# Patient Record
Sex: FEMALE | Race: WHITE | Employment: FULL TIME | URBAN - METROPOLITAN AREA
[De-identification: names, ages, dates, MRNs, and addresses within clinical notes are randomized per-mention and may not be internally consistent; named-entity substitution may affect disease eponyms.]

---

## 2017-08-19 ENCOUNTER — HOSPITAL ENCOUNTER (OUTPATIENT)
Age: 52
Discharge: HOME | End: 2017-08-19
Payer: COMMERCIAL

## 2017-08-19 LAB
ALBUMIN SERPL-MCNC: 4.2 G/DL (ref 3.5–5.1)
ALP BLD-CCNC: 103 U/L (ref 38–126)
ALT SERPL-CCNC: 81 U/L (ref 11–66)
ANION GAP SERPL CALCULATED.3IONS-SCNC: 22 MEQ/L (ref 8–16)
AST SERPL-CCNC: 75 U/L (ref 5–40)
BILIRUB SERPL-MCNC: 0.4 MG/DL (ref 0.3–1.2)
BUN BLDV-MCNC: 11 MG/DL (ref 7–22)
CALCIUM SERPL-MCNC: 9.6 MG/DL (ref 8.5–10.5)
CHLORIDE BLD-SCNC: 99 MEQ/L (ref 98–111)
CO2: 25 MEQ/L (ref 23–33)
CREAT SERPL-MCNC: 0.6 MG/DL (ref 0.4–1.2)
GFR SERPL CREATININE-BSD FRML MDRD: > 90 ML/MIN/1.73M2
GLUCOSE BLD-MCNC: 97 MG/DL (ref 70–108)
POTASSIUM SERPL-SCNC: 4.6 MEQ/L (ref 3.5–5.2)
SODIUM BLD-SCNC: 146 MEQ/L (ref 135–145)
TOTAL PROTEIN: 7.6 G/DL (ref 6.1–8)
TSH SERPL DL<=0.05 MIU/L-ACNC: 3.61 UIU/ML (ref 0.4–4.2)

## 2017-08-19 PROCEDURE — 80053 COMPREHEN METABOLIC PANEL: CPT

## 2017-08-19 PROCEDURE — 36415 COLL VENOUS BLD VENIPUNCTURE: CPT

## 2017-08-19 PROCEDURE — 84443 ASSAY THYROID STIM HORMONE: CPT

## 2017-09-18 ENCOUNTER — HOSPITAL ENCOUNTER (OUTPATIENT)
Dept: MAMMOGRAPHY | Age: 52
Discharge: HOME | End: 2017-09-18
Payer: COMMERCIAL

## 2017-09-18 DIAGNOSIS — Z12.9 SCREENING FOR CANCER: ICD-10-CM

## 2017-09-18 PROCEDURE — G0202 SCR MAMMO BI INCL CAD: HCPCS

## 2017-09-26 ENCOUNTER — HOSPITAL ENCOUNTER (OUTPATIENT)
Dept: WOMENS IMAGING | Age: 52
Discharge: HOME | End: 2017-09-26
Payer: COMMERCIAL

## 2017-09-26 DIAGNOSIS — R92.2 BREAST DENSITY: ICD-10-CM

## 2017-09-26 PROCEDURE — 76642 ULTRASOUND BREAST LIMITED: CPT

## 2018-03-03 ENCOUNTER — HOSPITAL ENCOUNTER (OUTPATIENT)
Age: 53
Discharge: HOME | End: 2018-03-03
Payer: COMMERCIAL

## 2018-03-03 LAB
ALT SERPL-CCNC: 115 U/L (ref 11–66)
ANION GAP SERPL CALCULATED.3IONS-SCNC: 13 MEQ/L (ref 8–16)
BUN BLDV-MCNC: 10 MG/DL (ref 7–22)
CALCIUM SERPL-MCNC: 8.9 MG/DL (ref 8.5–10.5)
CHLORIDE BLD-SCNC: 105 MEQ/L (ref 98–111)
CHOLESTEROL, TOTAL: 191 MG/DL (ref 100–199)
CO2: 27 MEQ/L (ref 23–33)
CREAT SERPL-MCNC: 0.5 MG/DL (ref 0.4–1.2)
GFR SERPL CREATININE-BSD FRML MDRD: > 90 ML/MIN/1.73M2
GLUCOSE BLD-MCNC: 91 MG/DL (ref 70–108)
HDLC SERPL-MCNC: 38 MG/DL
LDL CHOLESTEROL CALCULATED: 118 MG/DL
POTASSIUM SERPL-SCNC: 4.2 MEQ/L (ref 3.5–5.2)
SODIUM BLD-SCNC: 145 MEQ/L (ref 135–145)
TRIGL SERPL-MCNC: 177 MG/DL (ref 0–199)

## 2018-03-03 PROCEDURE — 80061 LIPID PANEL: CPT

## 2018-03-03 PROCEDURE — 36415 COLL VENOUS BLD VENIPUNCTURE: CPT

## 2018-03-03 PROCEDURE — 84460 ALANINE AMINO (ALT) (SGPT): CPT

## 2018-03-03 PROCEDURE — 80048 BASIC METABOLIC PNL TOTAL CA: CPT

## 2018-03-27 ENCOUNTER — HOSPITAL ENCOUNTER (OUTPATIENT)
Dept: ULTRASOUND IMAGING | Age: 53
Discharge: HOME | End: 2018-03-27
Payer: COMMERCIAL

## 2018-03-27 ENCOUNTER — HOSPITAL ENCOUNTER (OUTPATIENT)
Age: 53
Discharge: HOME | End: 2018-03-27
Payer: COMMERCIAL

## 2018-03-27 DIAGNOSIS — R74.8 ABNORMAL LIVER ENZYMES: ICD-10-CM

## 2018-03-27 LAB
ALBUMIN SERPL-MCNC: 4.2 G/DL (ref 3.5–5.1)
ALP BLD-CCNC: 97 U/L (ref 38–126)
ALT SERPL-CCNC: 106 U/L (ref 11–66)
AST SERPL-CCNC: 89 U/L (ref 5–40)
BILIRUB SERPL-MCNC: 0.4 MG/DL (ref 0.3–1.2)
BILIRUBIN DIRECT: < 0.2 MG/DL (ref 0–0.3)
HEPATITIS B SURFACE ANTIGEN: NEGATIVE
HEPATITIS C ANTIBODY: NEGATIVE
TOTAL PROTEIN: 7.6 G/DL (ref 6.1–8)

## 2018-03-27 PROCEDURE — 80076 HEPATIC FUNCTION PANEL: CPT

## 2018-03-27 PROCEDURE — 86803 HEPATITIS C AB TEST: CPT

## 2018-03-27 PROCEDURE — 87340 HEPATITIS B SURFACE AG IA: CPT

## 2018-03-27 PROCEDURE — 36415 COLL VENOUS BLD VENIPUNCTURE: CPT

## 2018-03-27 PROCEDURE — 76705 ECHO EXAM OF ABDOMEN: CPT

## 2018-09-08 ENCOUNTER — HOSPITAL ENCOUNTER (OUTPATIENT)
Age: 53
Discharge: HOME | End: 2018-09-08
Payer: COMMERCIAL

## 2018-09-08 PROCEDURE — 83036 HEMOGLOBIN GLYCOSYLATED A1C: CPT

## 2018-09-08 PROCEDURE — 36415 COLL VENOUS BLD VENIPUNCTURE: CPT

## 2018-09-09 LAB
AVERAGE GLUCOSE: 108 MG/DL (ref 70–126)
HBA1C MFR BLD: 5.6 % (ref 4.4–6.4)

## 2018-09-15 ENCOUNTER — HOSPITAL ENCOUNTER (OUTPATIENT)
Age: 53
Discharge: HOME | End: 2018-09-15
Payer: COMMERCIAL

## 2018-09-15 LAB
ALBUMIN SERPL-MCNC: 4.4 G/DL (ref 3.5–5.1)
ALP BLD-CCNC: 103 U/L (ref 38–126)
ALT SERPL-CCNC: 30 U/L (ref 11–66)
ANION GAP SERPL CALCULATED.3IONS-SCNC: 12 MEQ/L (ref 8–16)
AST SERPL-CCNC: 27 U/L (ref 5–40)
BILIRUB SERPL-MCNC: 0.4 MG/DL (ref 0.3–1.2)
BUN BLDV-MCNC: 13 MG/DL (ref 7–22)
CALCIUM SERPL-MCNC: 9.7 MG/DL (ref 8.5–10.5)
CHLORIDE BLD-SCNC: 107 MEQ/L (ref 98–111)
CO2: 28 MEQ/L (ref 23–33)
CREAT SERPL-MCNC: 0.6 MG/DL (ref 0.4–1.2)
GFR SERPL CREATININE-BSD FRML MDRD: > 90 ML/MIN/1.73M2
GLUCOSE BLD-MCNC: 120 MG/DL (ref 70–108)
POTASSIUM SERPL-SCNC: 4.7 MEQ/L (ref 3.5–5.2)
SODIUM BLD-SCNC: 147 MEQ/L (ref 135–145)
T4 FREE: 1.11 NG/DL (ref 0.93–1.76)
TOTAL PROTEIN: 7.4 G/DL (ref 6.1–8)
TSH SERPL DL<=0.05 MIU/L-ACNC: 2.32 UIU/ML (ref 0.4–4.2)

## 2018-09-15 PROCEDURE — 84439 ASSAY OF FREE THYROXINE: CPT

## 2018-09-15 PROCEDURE — 80053 COMPREHEN METABOLIC PANEL: CPT

## 2018-09-15 PROCEDURE — 36415 COLL VENOUS BLD VENIPUNCTURE: CPT

## 2018-09-15 PROCEDURE — 84443 ASSAY THYROID STIM HORMONE: CPT

## 2018-10-15 ENCOUNTER — HOSPITAL ENCOUNTER (OUTPATIENT)
Dept: WOMENS IMAGING | Age: 53
Discharge: HOME | End: 2018-10-15
Payer: COMMERCIAL

## 2018-10-15 DIAGNOSIS — Z12.31 VISIT FOR SCREENING MAMMOGRAM: ICD-10-CM

## 2018-10-15 PROCEDURE — 77063 BREAST TOMOSYNTHESIS BI: CPT

## 2019-03-23 ENCOUNTER — HOSPITAL ENCOUNTER (EMERGENCY)
Age: 54
Discharge: HOME | End: 2019-03-23
Payer: COMMERCIAL

## 2019-03-23 VITALS
HEART RATE: 113 BPM | RESPIRATION RATE: 20 BRPM | DIASTOLIC BLOOD PRESSURE: 77 MMHG | SYSTOLIC BLOOD PRESSURE: 144 MMHG | OXYGEN SATURATION: 96 % | TEMPERATURE: 98.8 F | WEIGHT: 260 LBS | HEIGHT: 68 IN | BODY MASS INDEX: 39.4 KG/M2

## 2019-03-23 DIAGNOSIS — J20.9 ACUTE BRONCHITIS, UNSPECIFIED ORGANISM: Primary | ICD-10-CM

## 2019-03-23 DIAGNOSIS — R09.81 SINUS CONGESTION: ICD-10-CM

## 2019-03-23 PROCEDURE — 99213 OFFICE O/P EST LOW 20 MIN: CPT | Performed by: NURSE PRACTITIONER

## 2019-03-23 PROCEDURE — 99212 OFFICE O/P EST SF 10 MIN: CPT

## 2019-03-23 RX ORDER — CEFDINIR 300 MG/1
300 CAPSULE ORAL 2 TIMES DAILY
Qty: 20 CAPSULE | Refills: 0 | Status: SHIPPED | OUTPATIENT
Start: 2019-03-23 | End: 2019-04-02

## 2019-03-23 RX ORDER — PREDNISONE 20 MG/1
20 TABLET ORAL 2 TIMES DAILY
Qty: 10 TABLET | Refills: 0 | Status: SHIPPED | OUTPATIENT
Start: 2019-03-23 | End: 2019-03-28

## 2019-03-23 RX ORDER — IBUPROFEN 400 MG/1
400 TABLET ORAL EVERY 6 HOURS PRN
Status: ON HOLD | COMMUNITY
End: 2019-10-21 | Stop reason: HOSPADM

## 2019-03-23 RX ORDER — BENZONATATE 200 MG/1
200 CAPSULE ORAL 3 TIMES DAILY PRN
Qty: 21 CAPSULE | Refills: 0 | Status: SHIPPED | OUTPATIENT
Start: 2019-03-23 | End: 2019-03-30

## 2019-03-23 RX ORDER — OMEPRAZOLE 40 MG/1
40 CAPSULE, DELAYED RELEASE ORAL DAILY
COMMUNITY

## 2019-03-23 ASSESSMENT — PAIN - FUNCTIONAL ASSESSMENT: PAIN_FUNCTIONAL_ASSESSMENT: PREVENTS OR INTERFERES WITH ALL ACTIVE AND SOME PASSIVE ACTIVITIES

## 2019-03-23 ASSESSMENT — PAIN DESCRIPTION - DESCRIPTORS: DESCRIPTORS: ACHING;SORE

## 2019-03-23 ASSESSMENT — ENCOUNTER SYMPTOMS
FACIAL SWELLING: 0
NAUSEA: 1
COUGH: 1
WHEEZING: 0
SINUS PRESSURE: 1
SINUS CONGESTION: 1
RHINORRHEA: 1
SORE THROAT: 1
SHORTNESS OF BREATH: 0

## 2019-03-23 ASSESSMENT — PAIN DESCRIPTION - ONSET: ONSET: ON-GOING

## 2019-03-23 ASSESSMENT — PAIN DESCRIPTION - PROGRESSION: CLINICAL_PROGRESSION: NOT CHANGED

## 2019-03-23 ASSESSMENT — PAIN SCALES - GENERAL: PAINLEVEL_OUTOF10: 8

## 2019-03-23 ASSESSMENT — PAIN DESCRIPTION - FREQUENCY: FREQUENCY: INTERMITTENT

## 2019-03-23 ASSESSMENT — PAIN DESCRIPTION - LOCATION: LOCATION: GENERALIZED;HEAD;THROAT

## 2019-03-25 ENCOUNTER — APPOINTMENT (OUTPATIENT)
Dept: GENERAL RADIOLOGY | Age: 54
End: 2019-03-25
Payer: COMMERCIAL

## 2019-03-25 ENCOUNTER — HOSPITAL ENCOUNTER (EMERGENCY)
Age: 54
Discharge: HOME | End: 2019-03-25
Payer: COMMERCIAL

## 2019-03-25 VITALS
WEIGHT: 260 LBS | DIASTOLIC BLOOD PRESSURE: 75 MMHG | BODY MASS INDEX: 40.81 KG/M2 | SYSTOLIC BLOOD PRESSURE: 146 MMHG | OXYGEN SATURATION: 95 % | RESPIRATION RATE: 20 BRPM | HEART RATE: 78 BPM | TEMPERATURE: 98.2 F | HEIGHT: 67 IN

## 2019-03-25 DIAGNOSIS — J06.9 VIRAL URI WITH COUGH: Primary | ICD-10-CM

## 2019-03-25 LAB
FLU A ANTIGEN: NEGATIVE
FLU B ANTIGEN: NEGATIVE

## 2019-03-25 PROCEDURE — 99283 EMERGENCY DEPT VISIT LOW MDM: CPT

## 2019-03-25 PROCEDURE — 71046 X-RAY EXAM CHEST 2 VIEWS: CPT

## 2019-03-25 PROCEDURE — 87804 INFLUENZA ASSAY W/OPTIC: CPT

## 2019-03-25 RX ORDER — GUAIFENESIN AND DEXTROMETHORPHAN HYDROBROMIDE 600; 30 MG/1; MG/1
1 TABLET, EXTENDED RELEASE ORAL 2 TIMES DAILY
Qty: 28 TABLET | Refills: 0 | Status: ON HOLD | OUTPATIENT
Start: 2019-03-25 | End: 2021-04-23

## 2019-03-25 ASSESSMENT — ENCOUNTER SYMPTOMS
EYE DISCHARGE: 0
ABDOMINAL PAIN: 0
SHORTNESS OF BREATH: 0
VOMITING: 0
DIARRHEA: 0
EYE PAIN: 0
BACK PAIN: 0
RHINORRHEA: 1
SORE THROAT: 1
WHEEZING: 0
NAUSEA: 0
COUGH: 1

## 2019-03-25 ASSESSMENT — PAIN SCALES - GENERAL: PAINLEVEL_OUTOF10: 5

## 2019-03-25 ASSESSMENT — PAIN DESCRIPTION - PAIN TYPE: TYPE: ACUTE PAIN

## 2019-03-25 ASSESSMENT — PAIN DESCRIPTION - DESCRIPTORS: DESCRIPTORS: DISCOMFORT

## 2019-03-25 ASSESSMENT — PAIN DESCRIPTION - LOCATION: LOCATION: RIB CAGE

## 2019-05-11 ENCOUNTER — HOSPITAL ENCOUNTER (OUTPATIENT)
Dept: GENERAL RADIOLOGY | Age: 54
Discharge: HOME | End: 2019-05-11
Payer: COMMERCIAL

## 2019-05-11 ENCOUNTER — HOSPITAL ENCOUNTER (OUTPATIENT)
Age: 54
Discharge: HOME | End: 2019-05-11
Payer: COMMERCIAL

## 2019-05-11 DIAGNOSIS — M25.562 LEFT KNEE PAIN, UNSPECIFIED CHRONICITY: ICD-10-CM

## 2019-05-11 PROCEDURE — 73564 X-RAY EXAM KNEE 4 OR MORE: CPT

## 2019-09-03 ENCOUNTER — HOSPITAL ENCOUNTER (OUTPATIENT)
Dept: ULTRASOUND IMAGING | Age: 54
Discharge: HOME | End: 2019-09-03
Payer: COMMERCIAL

## 2019-09-03 DIAGNOSIS — R79.89 LFTS ABNORMAL: ICD-10-CM

## 2019-09-03 PROCEDURE — 76705 ECHO EXAM OF ABDOMEN: CPT

## 2019-10-16 ENCOUNTER — HOSPITAL ENCOUNTER (OUTPATIENT)
Dept: WOMENS IMAGING | Age: 54
Discharge: HOME | End: 2019-10-16
Payer: COMMERCIAL

## 2019-10-16 DIAGNOSIS — Z13.9 VISIT FOR SCREENING: ICD-10-CM

## 2019-10-16 PROCEDURE — 77063 BREAST TOMOSYNTHESIS BI: CPT

## 2019-10-21 ENCOUNTER — ANESTHESIA EVENT (OUTPATIENT)
Dept: ENDOSCOPY | Age: 54
End: 2019-10-21
Payer: COMMERCIAL

## 2019-10-21 ENCOUNTER — ANESTHESIA (OUTPATIENT)
Dept: ENDOSCOPY | Age: 54
End: 2019-10-21
Payer: COMMERCIAL

## 2019-10-21 ENCOUNTER — HOSPITAL ENCOUNTER (OUTPATIENT)
Age: 54
Setting detail: OUTPATIENT SURGERY
Discharge: HOME | End: 2019-10-21
Attending: INTERNAL MEDICINE | Admitting: INTERNAL MEDICINE
Payer: COMMERCIAL

## 2019-10-21 VITALS
BODY MASS INDEX: 44.73 KG/M2 | SYSTOLIC BLOOD PRESSURE: 130 MMHG | OXYGEN SATURATION: 93 % | RESPIRATION RATE: 16 BRPM | HEIGHT: 67 IN | DIASTOLIC BLOOD PRESSURE: 72 MMHG | WEIGHT: 285 LBS | HEART RATE: 83 BPM | TEMPERATURE: 97.8 F

## 2019-10-21 VITALS
OXYGEN SATURATION: 97 % | DIASTOLIC BLOOD PRESSURE: 95 MMHG | RESPIRATION RATE: 13 BRPM | SYSTOLIC BLOOD PRESSURE: 149 MMHG

## 2019-10-21 PROCEDURE — 6360000002 HC RX W HCPCS: Performed by: NURSE ANESTHETIST, CERTIFIED REGISTERED

## 2019-10-21 PROCEDURE — 2500000003 HC RX 250 WO HCPCS: Performed by: NURSE ANESTHETIST, CERTIFIED REGISTERED

## 2019-10-21 PROCEDURE — 3700000000 HC ANESTHESIA ATTENDED CARE: Performed by: INTERNAL MEDICINE

## 2019-10-21 PROCEDURE — 2580000003 HC RX 258: Performed by: INTERNAL MEDICINE

## 2019-10-21 PROCEDURE — 7100000000 HC PACU RECOVERY - FIRST 15 MIN: Performed by: INTERNAL MEDICINE

## 2019-10-21 PROCEDURE — 3609017700 HC EGD DILATION GASTRIC/DUODENAL STRICTURE: Performed by: INTERNAL MEDICINE

## 2019-10-21 PROCEDURE — 3700000001 HC ADD 15 MINUTES (ANESTHESIA): Performed by: INTERNAL MEDICINE

## 2019-10-21 PROCEDURE — 6370000000 HC RX 637 (ALT 250 FOR IP): Performed by: NURSE ANESTHETIST, CERTIFIED REGISTERED

## 2019-10-21 PROCEDURE — 7100000001 HC PACU RECOVERY - ADDTL 15 MIN: Performed by: INTERNAL MEDICINE

## 2019-10-21 PROCEDURE — 2709999900 HC NON-CHARGEABLE SUPPLY: Performed by: INTERNAL MEDICINE

## 2019-10-21 RX ORDER — MULTIVIT WITH MINERALS/LUTEIN
1000 TABLET ORAL DAILY
Status: ON HOLD | COMMUNITY
End: 2021-04-23

## 2019-10-21 RX ORDER — LIDOCAINE HYDROCHLORIDE 20 MG/ML
INJECTION, SOLUTION INFILTRATION; PERINEURAL PRN
Status: DISCONTINUED | OUTPATIENT
Start: 2019-10-21 | End: 2019-10-21 | Stop reason: SDUPTHER

## 2019-10-21 RX ORDER — PROPOFOL 10 MG/ML
INJECTION, EMULSION INTRAVENOUS PRN
Status: DISCONTINUED | OUTPATIENT
Start: 2019-10-21 | End: 2019-10-21 | Stop reason: SDUPTHER

## 2019-10-21 RX ORDER — ASCORBIC ACID 500 MG
500 TABLET ORAL DAILY
Status: ON HOLD | COMMUNITY
End: 2021-04-23

## 2019-10-21 RX ORDER — SODIUM CHLORIDE 450 MG/100ML
INJECTION, SOLUTION INTRAVENOUS CONTINUOUS
Status: DISCONTINUED | OUTPATIENT
Start: 2019-10-21 | End: 2019-10-21 | Stop reason: HOSPADM

## 2019-10-21 RX ORDER — CHLORAL HYDRATE 500 MG
1 CAPSULE ORAL NIGHTLY
COMMUNITY

## 2019-10-21 RX ADMIN — PROPOFOL 50 MG: 10 INJECTION, EMULSION INTRAVENOUS at 11:07

## 2019-10-21 RX ADMIN — PROPOFOL 50 MG: 10 INJECTION, EMULSION INTRAVENOUS at 11:12

## 2019-10-21 RX ADMIN — SODIUM CHLORIDE: 4.5 INJECTION, SOLUTION INTRAVENOUS at 10:05

## 2019-10-21 RX ADMIN — PROPOFOL 50 MG: 10 INJECTION, EMULSION INTRAVENOUS at 11:09

## 2019-10-21 RX ADMIN — PROPOFOL 100 MG: 10 INJECTION, EMULSION INTRAVENOUS at 11:05

## 2019-10-21 RX ADMIN — Medication 1 SPRAY: at 10:59

## 2019-10-21 RX ADMIN — LIDOCAINE HYDROCHLORIDE 100 MG: 20 INJECTION, SOLUTION INFILTRATION; PERINEURAL at 11:01

## 2019-10-21 ASSESSMENT — PAIN - FUNCTIONAL ASSESSMENT: PAIN_FUNCTIONAL_ASSESSMENT: 0-10

## 2019-10-21 ASSESSMENT — PAIN SCALES - GENERAL: PAINLEVEL_OUTOF10: 4

## 2019-10-21 ASSESSMENT — PAIN DESCRIPTION - LOCATION: LOCATION: THROAT

## 2019-10-21 ASSESSMENT — PAIN DESCRIPTION - PAIN TYPE: TYPE: ACUTE PAIN

## 2020-01-14 ENCOUNTER — HOSPITAL ENCOUNTER (OUTPATIENT)
Dept: MRI IMAGING | Age: 55
Discharge: HOME | End: 2020-01-14
Payer: COMMERCIAL

## 2020-01-14 PROCEDURE — 72148 MRI LUMBAR SPINE W/O DYE: CPT

## 2020-02-22 ENCOUNTER — HOSPITAL ENCOUNTER (OUTPATIENT)
Age: 55
Discharge: HOME | End: 2020-02-22
Payer: COMMERCIAL

## 2020-02-22 ENCOUNTER — HOSPITAL ENCOUNTER (OUTPATIENT)
Dept: GENERAL RADIOLOGY | Age: 55
Discharge: HOME | End: 2020-02-22
Payer: COMMERCIAL

## 2020-02-22 LAB
ANION GAP SERPL CALCULATED.3IONS-SCNC: 15 MEQ/L (ref 8–16)
APTT: 29.7 SECONDS (ref 22–38)
BUN BLDV-MCNC: 24 MG/DL (ref 7–22)
CHLORIDE BLD-SCNC: 103 MEQ/L (ref 98–111)
CO2: 25 MEQ/L (ref 23–33)
CREAT SERPL-MCNC: 0.7 MG/DL (ref 0.4–1.2)
EKG ATRIAL RATE: 83 BPM
EKG P AXIS: 59 DEGREES
EKG P-R INTERVAL: 138 MS
EKG Q-T INTERVAL: 348 MS
EKG QRS DURATION: 84 MS
EKG QTC CALCULATION (BAZETT): 408 MS
EKG R AXIS: 9 DEGREES
EKG T AXIS: 48 DEGREES
EKG VENTRICULAR RATE: 83 BPM
ERYTHROCYTE [DISTWIDTH] IN BLOOD BY AUTOMATED COUNT: 14.5 % (ref 11.5–14.5)
ERYTHROCYTE [DISTWIDTH] IN BLOOD BY AUTOMATED COUNT: 46.3 FL (ref 35–45)
GFR SERPL CREATININE-BSD FRML MDRD: 87 ML/MIN/1.73M2
GLUCOSE BLD-MCNC: 106 MG/DL (ref 70–108)
HCT VFR BLD CALC: 46.7 % (ref 37–47)
HEMOGLOBIN: 15.2 GM/DL (ref 12–16)
INR BLD: 1.11 (ref 0.85–1.13)
MCH RBC QN AUTO: 28.7 PG (ref 26–33)
MCHC RBC AUTO-ENTMCNC: 32.5 GM/DL (ref 32.2–35.5)
MCV RBC AUTO: 88.3 FL (ref 81–99)
PLATELET # BLD: 325 THOU/MM3 (ref 130–400)
PMV BLD AUTO: 10.2 FL (ref 9.4–12.4)
POTASSIUM SERPL-SCNC: 3.8 MEQ/L (ref 3.5–5.2)
PREGNANCY, SERUM: NEGATIVE
RBC # BLD: 5.29 MILL/MM3 (ref 4.2–5.4)
SODIUM BLD-SCNC: 143 MEQ/L (ref 135–145)
WBC # BLD: 10.4 THOU/MM3 (ref 4.8–10.8)

## 2020-02-22 PROCEDURE — 84703 CHORIONIC GONADOTROPIN ASSAY: CPT

## 2020-02-22 PROCEDURE — 82565 ASSAY OF CREATININE: CPT

## 2020-02-22 PROCEDURE — 36415 COLL VENOUS BLD VENIPUNCTURE: CPT

## 2020-02-22 PROCEDURE — 85610 PROTHROMBIN TIME: CPT

## 2020-02-22 PROCEDURE — 93005 ELECTROCARDIOGRAM TRACING: CPT | Performed by: ORTHOPAEDIC SURGERY

## 2020-02-22 PROCEDURE — 71046 X-RAY EXAM CHEST 2 VIEWS: CPT

## 2020-02-22 PROCEDURE — 80051 ELECTROLYTE PANEL: CPT

## 2020-02-22 PROCEDURE — 85027 COMPLETE CBC AUTOMATED: CPT

## 2020-02-22 PROCEDURE — 84520 ASSAY OF UREA NITROGEN: CPT

## 2020-02-22 PROCEDURE — 85730 THROMBOPLASTIN TIME PARTIAL: CPT

## 2020-02-22 PROCEDURE — 82947 ASSAY GLUCOSE BLOOD QUANT: CPT

## 2020-06-22 ENCOUNTER — HOSPITAL ENCOUNTER (OUTPATIENT)
Dept: WOMENS IMAGING | Age: 55
Discharge: HOME | End: 2020-06-22
Payer: COMMERCIAL

## 2020-06-22 PROCEDURE — G0279 TOMOSYNTHESIS, MAMMO: HCPCS

## 2020-06-22 PROCEDURE — 76642 ULTRASOUND BREAST LIMITED: CPT

## 2020-12-16 ENCOUNTER — HOSPITAL ENCOUNTER (OUTPATIENT)
Dept: PHYSICAL THERAPY | Age: 55
Setting detail: THERAPIES SERIES
Discharge: HOME | End: 2020-12-16
Payer: COMMERCIAL

## 2020-12-16 PROCEDURE — 97162 PT EVAL MOD COMPLEX 30 MIN: CPT

## 2020-12-16 NOTE — FLOWSHEET NOTE
** PLEASE SIGN, DATE AND TIME CERTIFICATION BELOW AND RETURN TO [facility] OUTPATIENT REHABILITATION ( AX #: [phone]). ATTEST/CO-SIGN IF ACCESSING VIA [employer]. THANK YOU.**    I certify that I have examined the patient below and determined that Physical Medicine and Rehabilitation service is necessary and that I approve the established plan of care for up to 90 days or as specifically noted.   Attestation, signature or co-signature of physician indicates approval of certification requirements.    ________________________ ____________ __________  Physician Signature   Date   Time  [facility]  PHYSICAL THERAPY  [x] EVALUATION  [] DAILY NOTE (LAND) [] DAILY NOTE (AQUATIC ) [] PROGRESS NOTE [] DISCHARGE NOTE    [x] [address] [place] [address]   [] [place] [address]    [] [facility]   [] Alexia Opitz    Date: 2020  Patient Name:  Chanda Amaya  : 1965  MRN: [id number]  CSN: [id number]    Referring Practitioner Katie Galdamez MD   Diagnosis Unilateral primary osteoarthritis, right knee [M17.11]  Unilateral primary osteoarthritis, left knee [M17.12]  Presence of right artificial knee joint [Z96.651]  Presence of left artificial knee joint [Z96.652]    Treatment Diagnosis R knee pain, L knee pain, R knee stiffness, L knee stiffness, Muscle weakness, Difficulty walking, Edema, Unsteadiness   Date of Evaluation 20    Additional Pertinent History Hyperlipidemia, anxiety, obesity, lumbar surgery, hx of B knee scope 2019      Functional Outcome Measure Used LEFS   Functional Outcome Score 4/80 (20)       Insurance: Primary: Payor: Dusty Verduzco /  /  / ,   Secondary: [facility]   Authorization Information: [employer] after Eval; Aquatics, modalities and telehealth covered, NO IONTO   Visit # 1, 1/10 for progress note   Visits Allowed: 20 visits per calendar year   Recertification Date: 3/66/4011   Physician Follow-Up: 12/21/2020   Physician Orders:    History of Present Illness: Patient presents status post B TKA performed 12/11/2020 by Dr. Anne-Marie Quiles at [facility]. Patient reports having overnight stay until 12/14/2020 in the evening. Patient reports having difficulty with pain control since surgery and is using Norco, Tramadol, and a muscle relaxer with ibuprofen PRN. Patient continues to use ice as often as possible. Patient is using CPM 6-8 hours per day per leg at this point and was told to do this routine for 30 days. Patient reports current settings of -4 extension and 60 deg flexion. Patient reports insurance will not cover it for that long. Patient reports greatest functional difficulty with transfers, has no chairs in her home that are firm surface with armrests to push up so is having family assistance for transfers and needed therapist assistance from the waiting room chair to get up for eval. Patient reports she is sleeping in recliner, on her back with pillows under ankles only. Patient is having low back pain from abnormal walking pattern. Patient had difficulty with stairs as well. She is wearing B PRITI hose. Patient denies calf pain and is doing HEP from hospital. In August 2019 she had B knee scopes for torn meniscus and pain was persistent so replacement was opted for. SUBJECTIVE: Patient presents using standard walker, rating pain 7/10 bilateral knees and with very slow gait, abnormal pattern. Patient did take pain medication prior to evaluation. Patient feels very stiff and is having trouble modifying her home to meet her current needs. Social/Functional History and Current Status:  Medications and Allergies have been reviewed and are listed on Medical History Questionnaire. Mary Alice Boyd lives with family in a single story home with 1 small step from [facility]. .    Task Previous Current   ADLs  Independent Assistance Required; unable to shower now, doing sponge bath with family assistance.  Needs help Manual Therapy Time/Technique  Notes                     Exercise/Intervention   Notes   Quad sets 10B 3\" x    Heelslides 3B  x    Heelslides with strap       SAQ 5B 3\" x    Supine Hip Abduction 5B  x    SLR 5B  x    VMO SLR              LAQ  VR     Seated HS curls  VR            Standing HR/TR       Standing march       Standing 3-way hip       Standing HS curls       Mini squats       TKE       Upright Bike        Gait training  VR  Cues to increase B knee flexion and achieve heelstrike on initial contact. NK Table         Specific Interventions Next Treatment: Progress seated and standing LE strengthening and emphasize B knee AROM; progress HEP. Initiate bike or NuStep as soon as able. NK table when able. Continue providing gait and stair training. May consider use of Russian stim for neuro-re education to improve quad isolation with quad sets and SLR. Activity/Treatment Tolerance:  []  Patient tolerated treatment well  []  Patient limited by fatigue  [x]  Patient limited by pain   []  Patient limited by medical complications  []  Other:     Assessment:  Patient presents 5 days status post B TKA performed by Dr. Cory Aranda on 12/11/2020. Patient is severely limited at this time with regards to sit<>stand transfer ability, gait tolerance, and requires use of a walker for all mobility. Patient is also experiencing moderate to severe pain even with use of ice and pain medication. Patient demonstrates severe limitations in B knee AROM at this time with empty, painful end feel noted with heelslide overpressure. Patient additionally demonstrates weakness with MMT. Patient is in need of skilled PT to address pain complaints, restore full AROM to B knees, and to strengthen the BLE to improve stair climbing and transfers. Anticipate prolonged rehab course due to complexity of having bilateral replacement and current mobility status with level of assistance needed.      Body Structures/Functions/Activity Limitations: edema, impaired activity tolerance, impaired balance, impaired endurance, impaired motor control, impaired ROM, impaired strength, pain and abnormal gait  Prognosis: good    GOALS:  Patient Goal: Walk without AD, return to work. Short Term Goals:  Time Frame: 6 weeks    1. Patient will report reduction of B knee pain to less than 2/10 with ability to ascend/descend stairs and perform sit<>stand transfers. 2.  Patient will demonstrate increased B knee AROM to 0-120 degrees to improve car transfer and normalize gait pattern. 3. Patient will be able to perform indep sit<>stand transfer from all surfaces without need for UE support. Long Term Goals:  Time Frame: 12 weeks    1. Patient will demonstrate increased LE strength to greater than or equal to 4+/5 to improve overall standing tolerance and allow patient to return to work. 2.  Patient will demonstrate improved function on LEFS with score of >65/80. 3.  Patient will be independent with comprehensive HEP. 4.  Patient will ambulate without antalgia, unlimited community distances without need for AD demonstrating normalized knee mechanics. Patient Education:   [x]  HEP/Education Completed: Plan of Care, Goals, Rehab timeline and expectations following replacement. [employer] Access Code: Issued handouts but did not provide electronic log in information. []  No new Education completed  []  Reviewed Prior HEP      [x]  Patient verbalized and/or demonstrated understanding of education provided. []  Patient unable to verbalize and/or demonstrate understanding of education provided. Will continue education.   []  Barriers to learning:     PLAN:  Treatment Recommendations: Strengthening, Range of Motion, Balance Training, Functional Mobility Training, Transfer Training, Endurance Training, Gait Training, Stair Training, Neuromuscular Re-education, Manual Therapy - Soft Tissue Mobilization, Pain Management, Home Exercise Program, Patient Education, Safety Education and Training, Positioning, Aquatics and Modalities    [x]  Plan of care initiated. Plan to see patient 3 times per week for 12 weeks to address the treatment planned outlined above.   []  Continue with current plan of care  []  Modify plan of care as follows:    []  Hold pending physician visit  []  Discharge    Time In 0820   Time Out 0900   Timed Code Minutes: 5 min   Total Treatment Time: 40 min       Electronically Signed by: Matilde Ariza

## 2020-12-24 ENCOUNTER — HOSPITAL ENCOUNTER (OUTPATIENT)
Dept: PHYSICAL THERAPY | Age: 55
Setting detail: THERAPIES SERIES
Discharge: HOME | End: 2020-12-24
Payer: COMMERCIAL

## 2020-12-24 PROCEDURE — 97110 THERAPEUTIC EXERCISES: CPT

## 2020-12-24 NOTE — PROGRESS NOTES
[address] [facility]  PHYSICAL THERAPY  [] EVALUATION  [x] DAILY NOTE (LAND) [] DAILY NOTE (AQUATIC ) [] PROGRESS NOTE [] DISCHARGE NOTE    [x] OUTPATIENT REHABILITATION [facility]   [] [place] [address]    [] [facility]   [] Giselle Amabil    Date: 2020  Patient Name:  Teri Weber  : 1965  MRN: [id number]  CSN: [id number]    Referring Practitioner Silvestre Yi MD   Diagnosis Unilateral primary osteoarthritis, right knee [M17.11]  Unilateral primary osteoarthritis, left knee [M17.12]  Presence of right artificial knee joint [Z96.651]  Presence of left artificial knee joint [Z96.652]    Treatment Diagnosis R knee pain, L knee pain, R knee stiffness, L knee stiffness, Muscle weakness, Difficulty walking, Edema, Unsteadiness   Date of Evaluation 20    Additional Pertinent History Hyperlipidemia, anxiety, obesity, lumbar surgery, hx of B knee scope 2019      Functional Outcome Measure Used LEFS   Functional Outcome Score 4/80 (20)       Insurance: Primary: Payor: Grzegorz Stapleton /  /  / ,   Secondary: [facility]   Authorization Information: [employer]; Aquatics, modalities and telehealth covered, NO IONTO   Visit # 2, 2/10 for progress note   Visits Allowed: 2 visits 20 TO 21  Covered codes: [address], [address] [facility], [id number], [id number], [address], [address], [address]   Recertification Date:    Physician Follow-Up: 2020   Physician Orders:    History of Present Illness: Patient presents status post B TKA performed 2020 by Dr. Malcolm Lucas at [facility]. Patient reports having overnight stay until 2020 in the evening. Patient reports having difficulty with pain control since surgery and is using Norco, Tramadol, and a muscle relaxer with ibuprofen PRN. Patient continues to use ice as often as possible. Patient is using CPM 6-8 hours per day per leg at this point and was told to do this routine for 30 days.  Patient reports current settings of -4 extension and 60 deg flexion. Patient reports insurance will not cover it for that long. Patient reports greatest functional difficulty with transfers, has no chairs in her home that are firm surface with armrests to push up so is having family assistance for transfers and needed therapist assistance from the waiting room chair to get up for eval. Patient reports she is sleeping in recliner, on her back with pillows under ankles only. Patient is having low back pain from abnormal walking pattern. Patient had difficulty with stairs as well. She is wearing B PRITI hose. Patient denies calf pain and is doing HEP from hospital. In August 2019 she had B knee scopes for torn meniscus and pain was persistent so replacement was opted for. SUBJECTIVE: Patient has been doing HEP twice per day and using CPM 4 hours per side. Patient reports she cannot do the heelslides due to pain and stiffness. Patient saw surgeon earlier this week and was told things looked good. Patient had zip tie sutures removed and steri strips applied. Patient reports getting to 70 deg bend on L knee on CPM and 62 deg on R knee. Patient voiced significant frustration with time to get visits approved from insurance. Patient continues wearing PRITI hose for another 2-4 weeks during the day. Incisions are clean and healing well and patient continues to use ABD pad for any drainage and to prevent infection.        TREATMENT   Precautions:    Pain: 5/10 B knees    X in shaded column indicates activity completed today   Modalities Parameters/  Location  Notes                     Manual Therapy Time/Technique  Notes                     Exercise/Intervention   Notes   Quad sets 10B 3\" x Tapping tactile cues   Heelslides 10B  x    Heelslides with strap 10B 5\" x L knee 74 deg AAROM, R knee 79 deg AAROM   SAQ 5B 3\" x    Supine Hip Abduction 5B  x    SLR 5B  x    VMO SLR              LAQ 10B  x    Seated HS curl Heelslide 10B 3\" x           Standing HR/TR 10B x BUE support   Standing march 10B  x Alternating with BUE support   Standing 3-way hip 10B  x BUE support   Standing HS curls 10B  x Postural cues   Mini squats 5  x    TKE       NuStep  4 min Level 1 x Seat 12   Gait training  VR  Cues to increase B knee flexion and achieve heelstrike on initial contact. NK Table         Specific Interventions Next Treatment: Progress seated and standing LE strengthening and emphasize B knee AROM; progress HEP. Initiate bike or NuStep as soon as able. NK table when able. Continue providing gait and stair training. May consider use of Russian stim for neuro-re education to improve quad isolation with quad sets and SLR. Activity/Treatment Tolerance:  [x]  Patient tolerated treatment well  []  Patient limited by fatigue  []  Patient limited by pain   []  Patient limited by medical complications  []  Other:     Assessment:  Patient demonstrates nearly 20 degree improvement in knee flexion ROM compared to last visit. Patient remains below 90 degrees at this time which is impacting sit<>stand transfer tolerance. Patient with improved supine<>sit transfers today, now able to lift LE onto bed. Patient continues to ambulate with decreased knee flexion during swing phase of gait and was instructed to consider a wheel conversion kit to improve gait fluidity. Patient gait speed remains slow due to pain and stiffness. Patient is in need of prolonged, ongoing skilled PT to address B knee pain, stiffness, and muscle weakness that contributes to difficulty with transfers and stairs. Body Structures/Functions/Activity Limitations: edema, impaired activity tolerance, impaired balance, impaired endurance, impaired motor control, impaired ROM, impaired strength, pain and abnormal gait  Prognosis: good    GOALS:  Patient Goal: Walk without AD, return to work. Short Term Goals:  Time Frame: 6 weeks    1.  Patient will report reduction of B knee pain to less than 2/10 with ability to

## 2020-12-28 ENCOUNTER — HOSPITAL ENCOUNTER (OUTPATIENT)
Dept: PHYSICAL THERAPY | Age: 55
Setting detail: THERAPIES SERIES
Discharge: HOME | End: 2020-12-28
Payer: COMMERCIAL

## 2020-12-28 PROCEDURE — 97110 THERAPEUTIC EXERCISES: CPT

## 2020-12-28 NOTE — PROGRESS NOTES
[facility] [facility]  PHYSICAL THERAPY  [] EVALUATION  [] DAILY NOTE (LAND) [] DAILY NOTE (AQUATIC ) [x] PROGRESS NOTE for Auth [] DISCHARGE NOTE    [x] [facility]   [] [place] [address]    [] [facility]   [] Rosendo Favors    Date: 2020  Patient Name:  Kian Mirza  : 1965  MRN: [id number]  CSN: [id number]    Referring Practitioner Sarath Corbett MD   Diagnosis Unilateral primary osteoarthritis, right knee [M17.11]  Unilateral primary osteoarthritis, left knee [M17.12]  Presence of right artificial knee joint [Z96.651]  Presence of left artificial knee joint [Z96.652]    Treatment Diagnosis R knee pain, L knee pain, R knee stiffness, L knee stiffness, Muscle weakness, Difficulty walking, Edema, Unsteadiness   Date of Evaluation 20    Additional Pertinent History Hyperlipidemia, anxiety, obesity, lumbar surgery, hx of B knee scope 2019      Functional Outcome Measure Used LEFS   Functional Outcome Score 580 (20); 480 (20)       Insurance: Primary: Payor: Emilee Shabazz /  /  / ,   Secondary: [facility]   Authorization Information: [employer]; Aquatics, modalities and telehealth covered, NO IONTO   Visit # 3, 3/10 for progress note   Visits Allowed: 2 visits 20 TO 21  Covered codes: 66168, 88 649 24 60, [address], [id number], [address], [address], [address]   Recertification Date: 1118   Physician Follow-Up: 2020   Physician Orders:    History of Present Illness: Patient presents status post B TKA performed 2020 by Dr. Emma Garcia at [facility]. Patient reports having overnight stay until 2020 in the evening. Patient reports having difficulty with pain control since surgery and is using Norco, Tramadol, and a muscle relaxer with ibuprofen PRN. Patient continues to use ice as often as possible. Patient is using CPM 6-8 hours per day per leg at this point and was told to do this routine for 30 days.  Patient reports current settings of -4 extension and 60 deg flexion. Patient reports insurance will not cover it for that long. Patient reports greatest functional difficulty with transfers, has no chairs in her home that are firm surface with armrests to push up so is having family assistance for transfers and needed therapist assistance from the waiting room chair to get up for eval. Patient reports she is sleeping in recliner, on her back with pillows under ankles only. Patient is having low back pain from abnormal walking pattern. Patient had difficulty with stairs as well. She is wearing B PRITI hose. Patient denies calf pain and is doing HEP from hospital. In August 2019 she had B knee scopes for torn meniscus and pain was persistent so replacement was opted for. SUBJECTIVE: Patient has been doing HEP twice per day and using CPM 4 hours per side. Incisions are clean and healing well and patient continues to use ABD pad for any drainage and to prevent infection. Patient reports increased B knee pain today, rating L 7/10 and R 5/10 and is unsure what is causing all the discomfort today. She reports this increase in pain started last night and she took flexeril and Norco to try and get to sleep unsuccessfully. Patient rates overall improvement 10% at this point. Patient put tennis balls on her walker legs but does not feel this has helped it to slide any easier and plans to try 4WW later today.        TREATMENT   Precautions:    Pain: 5/10 R knee, 7/10 L knee    X in shaded column indicates activity completed today   Modalities Parameters/  Location  Notes                     Manual Therapy Time/Technique  Notes                     Exercise/Intervention   Notes   Quad sets 15B 3\" x Tapping tactile cues   Heelslides 10B  x    Heelslides with strap 10B 5\" x L knee 82 deg AAROM, R knee 81 deg AAROM   SAQ 5B 3\" x    Supine Hip Abduction 10B  x    SLR 10B  x    VMO SLR       Heel Prop    R knee lacking 5 deg from neutral; L knee lacking 6 deg from neutral          LAQ 10B  x    Seated HS curl Heelslide 10B 3\" x           Standing HR/TR 15B  x BUE support   Standing march 15B  x Alternating with BUE support   Standing 3-way hip 15B  x BUE support   Standing HS curls 15B  x Postural cues   Mini squats 10  x    TKE       NuStep  5 min Level 1 x Seat 11, no arms   Step Ups (fwd/lat)                     Gait training  VR  Cues to increase B knee flexion and achieve heelstrike on initial contact. NK Table         Specific Interventions Next Treatment: Add step ups next visit. Progress seated and standing LE strengthening and emphasize B knee AROM; progress HEP. NK table when able. Continue providing gait and stair training, discussion regarding transfers and LE alignment to increased functional knee bend. Activity/Treatment Tolerance:  [x]  Patient tolerated treatment well  []  Patient limited by fatigue  []  Patient limited by pain   []  Patient limited by medical complications  []  Other:     Assessment:  Patient demonstrates steady increases in B knee AROM, especially in extension, and remains compliant not only with HEP but also CPM usage. Patient does report moderate to severe pain in B knees today and attributes it to increased activity levels and really pushing HEP. Patient continues to rely on walker support for all ambulation and stair negotiation remains limited due to weakness and bilateral involvement. Patient remains moderately challenged with performance of sit<>stand transfers with propensity to kick LE out anteriorly to reduce functional bend but is receptive to all education and cues to improve this. LEFS score only improved by one point due to Weakness in BLE is evident at this time.  Based on the bilateral nature of symptoms, fact that TKA was performed 12/11/2020 (just over 2 weeks ago), and ongoing ROM and strength deficits that are impacting functional mobility and independent lifestyle, anticipate prolonged rehab course. Patient is in need of skilled PT 2-3x/week to improve mobility and restore function of both knees. Due to patient only having two treatments since evaluation, has not met any goals at this time. Body Structures/Functions/Activity Limitations: edema, impaired activity tolerance, impaired balance, impaired endurance, impaired motor control, impaired ROM, impaired strength, pain and abnormal gait  Prognosis: good    GOALS:  Patient Goal: Walk without AD, return to work. Short Term Goals:  Time Frame: 6 weeks    1. Patient will report reduction of B knee pain to less than 2/10 with ability to ascend/descend stairs and perform sit<>stand transfers. GOAL NOT MET; continue goal. Patient reports 4-5/10 pain and continues taking pain medication to address this. 2.  Patient will demonstrate increased B knee AROM to 0-120 degrees to improve car transfer and normalize gait pattern. GOAL NOT MET; continue goal. See treatment grid for measurements. 3. Patient will be able to perform indep sit<>stand transfer from all surfaces without need for UE support. GOAL NOT MET; continue goal. Patient with ongoing need for BUE support and at times physical assistance depending on the surface she is sitting on. Cues needed for LE alignment and to increase functional knee bend. Long Term Goals:  Time Frame: 12 weeks    1. Patient will demonstrate increased LE strength to greater than or equal to 4+/5 to improve overall standing tolerance and allow patient to return to work. GOAL NOT MET; continue goal. Patient demonstrates 4/5 global hip strength bilaterally but knees remains 4-/5.     2.  Patient will demonstrate improved function on LEFS with score of >65/80. GOAL NOT MET; continue goal. Score today was 5/80. 3.  Patient will be independent with comprehensive HEP.  GOAL PARTIALLY MET; continue goal. Patient demonstrates good compliance multiple times per day and continues to use CPM.     4.  Patient will ambulate without antalgia, unlimited community distances without need for AD demonstrating normalized knee mechanics. GOAL NOT MET; continue goal. Patient ambulates with walker at this time, ongoing deficits in knee flexion during swing phase and decreased heelstrike on initial contact. Moderate reliance on walker and distance limited to <300 feet at a time, not community distance functional yet. Patient Education:   [x]  HEP/Education Completed: Issued updated handouts for standing program today. [employer] Access Code: Issued handouts but did not provide electronic log in information. []  No new Education completed  [x]  Reviewed Prior HEP      [x]  Patient verbalized and/or demonstrated understanding of education provided. []  Patient unable to verbalize and/or demonstrate understanding of education provided. Will continue education. []  Barriers to learning:     PLAN:  Treatment Recommendations: Strengthening, Range of Motion, Balance Training, Functional Mobility Training, Transfer Training, Endurance Training, Gait Training, Stair Training, Neuromuscular Re-education, Manual Therapy - Soft Tissue Mobilization, Pain Management, Home Exercise Program, Patient Education, Safety Education and Training, Positioning, Aquatics and Modalities    []  Plan of care initiated. Plan to see patient 3 times per week for 12 weeks to address the treatment planned outlined above. [x]  Continue with current plan of care pending further auth from insurance.   []  Modify plan of care as follows:    []  Hold pending physician visit  []  Discharge    Time In 1115   Time Out 1210   Timed Code Minutes: 55 min   Total Treatment Time: 55 min       Electronically Signed by: Parrish Anderson PT, DPT [id number]

## 2021-01-04 ENCOUNTER — HOSPITAL ENCOUNTER (OUTPATIENT)
Dept: PHYSICAL THERAPY | Age: 56
Setting detail: THERAPIES SERIES
Discharge: HOME | End: 2021-01-04
Payer: COMMERCIAL

## 2021-01-04 PROCEDURE — 97110 THERAPEUTIC EXERCISES: CPT

## 2021-01-04 NOTE — PROGRESS NOTES
hours per day per leg at this point and was told to do this routine for 30 days. Patient reports current settings of -4 extension and 60 deg flexion. Patient reports insurance will not cover it for that long. Patient reports greatest functional difficulty with transfers, has no chairs in her home that are firm surface with armrests to push up so is having family assistance for transfers and needed therapist assistance from the waiting room chair to get up for eval. Patient reports she is sleeping in recliner, on her back with pillows under ankles only. Patient is having low back pain from abnormal walking pattern. Patient had difficulty with stairs as well. She is wearing B PRITI hose. Patient denies calf pain and is doing HEP from hospital. In August 2019 she had B knee scopes for torn meniscus and pain was persistent so replacement was opted for. SUBJECTIVE: Patient reporting of no drainage at knee. Continues to put Betadine on incisional areas. Healing well. Notes most difficulty with sit<>stand transfers. Walking with 4 wheeled walker today. Noting stiffness at knees. Pain level 4-5/10. Takes Weston and Ultram for pain. Occasionally Ibuprofen.       TREATMENT   Precautions:    Pain: 5/10 R knee, 5/10 L knee    X in shaded column indicates activity completed today   Modalities Parameters/  Location  Notes                     Manual Therapy Time/Technique  Notes                     Exercise/Intervention   Notes   Quad sets 15B 3\" x Tapping tactile cues   Heelslides 10B  x    Heelslides with strap 10B 5\" x L knee 82 deg AAROM, R knee 81 deg AAROM   SAQ 5B 3\" x    Supine Hip Abduction 10B  x    SLR 10B  x    VMO SLR       Heel Prop    R knee lacking 5 deg from neutral; L knee lacking 6 deg from neutral          LAQ 10B  x    Seated HS curl Heelslide 10B 3\" x           Standing HR/TR 15B  x BUE support   Standing march 15B  x Alternating with BUE support   Standing 3-way hip 15B  x BUE support   Standing HS curls 15B   Postural cues   Mini squats 10  x    TKE       NuStep  5 min Level 2  Seat 11, no arms   Step Ups (fwd/lat)                     Gait training  VR x Cues to increase B knee flexion and achieve heelstrike on initial contact. NK Table         Specific Interventions Next Treatment: Add step ups next visit. Progress seated and standing LE strengthening and emphasize B knee AROM; progress HEP. NK table when able. Continue providing gait and stair training, discussion regarding transfers and LE alignment to increased functional knee bend. Activity/Treatment Tolerance:  [x]  Patient tolerated treatment well  []  Patient limited by fatigue  []  Patient limited by pain   []  Patient limited by medical complications  []  Other:     Assessment:  Right knee ROM 5 degrees to 90 degrees flexion, Left knee ROM 0 degrees to 100 degrees flexion. Exercises progressing well for strength and ROM. Reps kept the same as well as ex this session. Transfers sit<>stand improving from mat table and Nustep seat height. Cues to flex knees through swing phase while using 4 ww. Body Structures/Functions/Activity Limitations: edema, impaired activity tolerance, impaired balance, impaired endurance, impaired motor control, impaired ROM, impaired strength, pain and abnormal gait  Prognosis: good    GOALS:  Patient Goal: Walk without AD, return to work. Short Term Goals:  Time Frame: 6 weeks    1. Patient will report reduction of B knee pain to less than 2/10 with ability to ascend/descend stairs and perform sit<>stand transfers. GOAL NOT MET; continue goal. Patient reports 4-5/10 pain and continues taking pain medication to address this. 2.  Patient will demonstrate increased B knee AROM to 0-120 degrees to improve car transfer and normalize gait pattern. GOAL NOT MET; continue goal. See treatment grid for measurements.      3. Patient will be able to perform indep sit<>stand transfer from all surfaces without need for UE Neuromuscular Re-education, Manual Therapy - Soft Tissue Mobilization, Pain Management, Home Exercise Program, Patient Education, Safety Education and Training, Positioning, Aquatics and Modalities    []  Plan of care initiated. Plan to see patient 3 times per week for 12 weeks to address the treatment planned outlined above. [x]  Continue with current plan of care pending further auth from insurance.   []  Modify plan of care as follows:    []  Hold pending physician visit  []  Discharge    Time In 1402   Time Out 1440   Timed Code Minutes: 38 min   Total Treatment Time: 38 min       Electronically Signed by: Vivi Singh PT,

## 2021-01-06 ENCOUNTER — HOSPITAL ENCOUNTER (OUTPATIENT)
Dept: PHYSICAL THERAPY | Age: 56
Setting detail: THERAPIES SERIES
Discharge: HOME | End: 2021-01-06
Payer: COMMERCIAL

## 2021-01-06 PROCEDURE — 97110 THERAPEUTIC EXERCISES: CPT

## 2021-01-06 NOTE — PROGRESS NOTES
[facility] [facility]  PHYSICAL THERAPY  [] EVALUATION  [x] DAILY NOTE (LAND) [] DAILY NOTE (AQUATIC ) [] PROGRESS NOTE [] DISCHARGE NOTE    [x] OUTPATIENT REHABILITATION CENTER [facility]   [] [place] [address]    [] [facility]   [] Adelia Gottlieb    Date: 2021  Patient Name:  Coretta Hatchet  : 1965  MRN: [id number]  CSN: [id number]    Referring Practitioner Tania Gao MD   Diagnosis Unilateral primary osteoarthritis, right knee [M17.11]  Unilateral primary osteoarthritis, left knee [M17.12]  Presence of right artificial knee joint [Z96.651]  Presence of left artificial knee joint [Z96.652]    Treatment Diagnosis R knee pain, L knee pain, R knee stiffness, L knee stiffness, Muscle weakness, Difficulty walking, Edema, Unsteadiness   Date of Evaluation 20    Additional Pertinent History Hyperlipidemia, anxiety, obesity, lumbar surgery, hx of B knee scope 2019      Functional Outcome Measure Used LEFS   Functional Outcome Score  (20);  (20)       Insurance: Primary: Payor: Karel Mak /  /  / ,   Secondary: [facility]   Authorization Information: [employer]; Aquatics, modalities and telehealth covered, NO IONTO   Visit # 5, 2/10 for progress note 14 visits total approved. Visits Allowed: 2 visits 20 TO 21, Reauthorized for 11 more visits from  through 3/28/21   Covered codes: 89622, 22207, [phone], [phone], [address], [address], [address]   Recertification Date: 1250   Physician Follow-Up: 2020   Physician Orders:    History of Present Illness: Patient presents status post B TKA performed 2020 by Dr. Rudi Dubois at [facility]. Patient reports having overnight stay until 2020 in the evening. Patient reports having difficulty with pain control since surgery and is using Norco, Tramadol, and a muscle relaxer with ibuprofen PRN. Patient continues to use ice as often as possible.  Patient is using CPM 6-8 hours per day per leg at this point and was told to do this routine for 30 days. Patient reports current settings of -4 extension and 60 deg flexion. Patient reports insurance will not cover it for that long. Patient reports greatest functional difficulty with transfers, has no chairs in her home that are firm surface with armrests to push up so is having family assistance for transfers and needed therapist assistance from the waiting room chair to get up for eval. Patient reports she is sleeping in recliner, on her back with pillows under ankles only. Patient is having low back pain from abnormal walking pattern. Patient had difficulty with stairs as well. She is wearing B PRITI hose. Patient denies calf pain and is doing HEP from hospital. In August 2019 she had B knee scopes for torn meniscus and pain was persistent so replacement was opted for. SUBJECTIVE:  Patient reports she has been trying to improve her sit<>stand transfer technique by allowing the knees to bend more and this has led to increased soreness. Patient is done using the CPM at this time and machine was picked up yesterday by [employer]. Patient has been doing HEP twice daily and find bending exercises most challenging. Patient continues to wear PRITI hose and has incisions covered, reports no drainage or redness.      TREATMENT   Precautions:    Pain: 5/10 R knee, 5/10 L knee    X in shaded column indicates activity completed today   Modalities Parameters/  Location  Notes                     Manual Therapy Time/Technique  Notes                     Exercise/Intervention   Notes   Quad sets 15B 3\"  Tapping tactile cues   Heelslides 10B      Heelslides with strap 10B 5\" x L knee 98 deg AAROM, R knee 92 deg AAROM   SAQ 5B 3\"     Supine Hip Abduction 10B  x    SLR 10B  x    VMO SLR       Heel Prop    R knee lacking 4 deg from neutral; L knee lacking 3 deg from neutral          LAQ 10B  x    Seated HS curl Heelslide 10B 3\" x           Standing HR/TR 15B  x BUE support   Standing march 15B  x Alternating with BUE support   Standing 3-way hip 15B  x BUE support   Standing HS curls 15B  X Postural cues   Mini squats 15  x    TKE 10B 5 sec X Blue band issued   NuStep  5 min Level 2  Seat 10, no arms   Step Ups (fwd/lat) 10B each way 6 inch X BUE support; cues to avoid hip hike and circumduction   Standing HS and knee flexion stretch (lunging forward with foot on step) 2B 20 sec X R knee flexion 94 deg, L knee flexion 102 deg          Gait training  VR x Cues to increase B knee flexion and achieve heelstrike on initial contact. NK Table         Specific Interventions Next Treatment: Progress seated and standing LE strengthening and emphasize B knee AROM; progress HEP. NK table when able. Continue providing gait and stair training, discussion regarding transfers and LE alignment to increased functional knee bend. Activity/Treatment Tolerance:  [x]  Patient tolerated treatment well  []  Patient limited by fatigue  []  Patient limited by pain   []  Patient limited by medical complications  []  Other:     Assessment:  Patient demonstrated improved sit<>stand transfer technique utilizing knee flexion and less UE reliance. Addition of forward step ups on 6 inch step was well tolerated today with patient needing intermittent cues for knee flexion to position the LE on the step rather than hiking and circumducting. Bilaterally, patient demonstrates increased knee flexion AROM today in heelslide and standing knee flexion positions. Patient is making steady progress at this time but benefits from ongoing instruction to address pain, weakness, stiffness, and functional deficits such as transfers and ambulation.      Body Structures/Functions/Activity Limitations: edema, impaired activity tolerance, impaired balance, impaired endurance, impaired motor control, impaired ROM, impaired strength, pain and abnormal gait  Prognosis: good    GOALS:  Patient Goal: Walk without AD, return to work. Short Term Goals:  Time Frame: 6 weeks    1. Patient will report reduction of B knee pain to less than 2/10 with ability to ascend/descend stairs and perform sit<>stand transfers. 2.  Patient will demonstrate increased B knee AROM to 0-120 degrees to improve car transfer and normalize gait pattern. 3. Patient will be able to perform indep sit<>stand transfer from all surfaces without need for UE support. Long Term Goals:  Time Frame: 12 weeks     Patient will demonstrate increased LE strength to greater than or equal to 4+/5 to improve overall standing tolerance and allow patient to return to work. 2.  Patient will demonstrate improved function on LEFS with score of >65/80. 3.  Patient will be independent with comprehensive HEP. 4.  Patient will ambulate without antalgia, unlimited community distances without need for AD demonstrating normalized knee mechanics. Patient Education:   [x]  HEP/Education Completed: Issued updated handouts for standing program today. [employer] Access Code: Issued handouts but did not provide electronic log in information. []  No new Education completed  [x]  Reviewed Prior HEP      [x]  Patient verbalized and/or demonstrated understanding of education provided. []  Patient unable to verbalize and/or demonstrate understanding of education provided. Will continue education. []  Barriers to learning:     PLAN:  Treatment Recommendations: Strengthening, Range of Motion, Balance Training, Functional Mobility Training, Transfer Training, Endurance Training, Gait Training, Stair Training, Neuromuscular Re-education, Manual Therapy - Soft Tissue Mobilization, Pain Management, Home Exercise Program, Patient Education, Safety Education and Training, Positioning, Aquatics and Modalities    []  Plan of care initiated. Plan to see patient 3 times per week for 12 weeks to address the treatment planned outlined above.   [x]  Continue with current plan of care   []  Modify plan of care as follows:    []  Hold pending physician visit  []  Discharge    Time In 0915   Time Out 1000   Timed Code Minutes: 45 min   Total Treatment Time: 45 min       Electronically Signed by: Duy Massey PT, DPT [id number]

## 2021-01-08 ENCOUNTER — HOSPITAL ENCOUNTER (OUTPATIENT)
Dept: PHYSICAL THERAPY | Age: 56
Setting detail: THERAPIES SERIES
Discharge: HOME | End: 2021-01-08
Payer: COMMERCIAL

## 2021-01-08 PROCEDURE — 97116 GAIT TRAINING THERAPY: CPT

## 2021-01-08 PROCEDURE — 97110 THERAPEUTIC EXERCISES: CPT

## 2021-01-08 NOTE — PROGRESS NOTES
[address] [facility]  PHYSICAL THERAPY  [] EVALUATION  [x] DAILY NOTE (LAND) [] DAILY NOTE (AQUATIC ) [] PROGRESS NOTE [] DISCHARGE NOTE    [x] OUTPATIENT REHABILITATION [facility]   [] [place] [address]    [] [facility]   [] [facility]    Date: 2021  Patient Name:  Delores Pete  : 1965  MRN: [id number]  CSN: [id number]    Referring Practitioner Kathrine Daigle MD   Diagnosis Unilateral primary osteoarthritis, right knee [M17.11]  Unilateral primary osteoarthritis, left knee [M17.12]  Presence of right artificial knee joint [Z96.651]  Presence of left artificial knee joint [Z96.652]    Treatment Diagnosis R knee pain, L knee pain, R knee stiffness, L knee stiffness, Muscle weakness, Difficulty walking, Edema, Unsteadiness   Date of Evaluation 20    Additional Pertinent History Hyperlipidemia, anxiety, obesity, lumbar surgery, hx of B knee scope 2019      Functional Outcome Measure Used LEFS   Functional Outcome Score 580 (20); 4 (20)       Insurance: Primary: Payor: Scot Lee /  /  / ,   Secondary: [facility]   Authorization Information: [employer]; Aquatics, modalities and telehealth covered, NO IONTO   Visit # 6, 3/10 for progress note 14 visits total approved. Visits Allowed: 2 visits 20 TO 21, Reauthorized for 11 more visits from  through 3/28/21   Covered codes: 69051, 82972, 0664 334 28 67, [phone], [address], [address], [address]   Recertification Date: 3/38/3455   Physician Follow-Up: 2020   Physician Orders:    History of Present Illness: Patient presents status post B TKA performed 2020 by Dr. Zhane Wright at [facility]. Patient reports having overnight stay until 2020 in the evening. Patient reports having difficulty with pain control since surgery and is using Norco, Tramadol, and a muscle relaxer with ibuprofen PRN. Patient continues to use ice as often as possible.  Patient is using CPM 6-8 hours per day per leg at this point and was told to do this routine for 30 days. Patient reports current settings of -4 extension and 60 deg flexion. Patient reports insurance will not cover it for that long. Patient reports greatest functional difficulty with transfers, has no chairs in her home that are firm surface with armrests to push up so is having family assistance for transfers and needed therapist assistance from the waiting room chair to get up for eval. Patient reports she is sleeping in recliner, on her back with pillows under ankles only. Patient is having low back pain from abnormal walking pattern. Patient had difficulty with stairs as well. She is wearing B PRITI hose. Patient denies calf pain and is doing HEP from hospital. In August 2019 she had B knee scopes for torn meniscus and pain was persistent so replacement was opted for. SUBJECTIVE:  Patient experienced moderate soreness following last visit that lasted the remainder of the day. Patient reports last night she slept for the first time in her bed and although she feels more rested, her hips are more sore as a result. Patient notes increased pain today, along R patella, and feels the knees are more swollen today. Patient questioned if she could be over doing the exercises at home.      TREATMENT   Precautions:    Pain: 6/10 R knee, 7/10 L knee    X in shaded column indicates activity completed today   Modalities Parameters/  Location  Notes                     Manual Therapy Time/Technique  Notes                     Exercise/Intervention   Notes   Quad sets 15B 3\"  Tapping tactile cues   Heelslides 10B      Heelslides with strap 10B 5\" x L knee 97 deg AAROM, R knee 87 deg AAROM   SAQ 5B 3\"     Supine Hip Abduction 10B      SLR 10B  x    VMO SLR 10B  X    Heel Prop    R knee lacking 4 deg from neutral; L knee lacking 3 deg from neutral   Seated knee flexion stretch with opp leg assist 1B 10 sec X    LAQ 10B      Seated HS curl Heelslide 15B 3\" x Standing HR/TR 15B  x BUE support   Standing march 15B  x Alternating with BUE support   Standing 3-way hip 15B  x BUE support   Standing HS curls 15B  X Postural cues   Mini squats 15  x    TKE 10B 5 sec X Blue band issued   NuStep  5 min Level 2  Seat 9, no arms   Step Ups (fwd/lat) 10B each way 6 inch X BUE support; cues to avoid hip hike and circumduction   Standing HS and knee flexion stretch (lunging forward with foot on step) 2B 20 sec X R knee flexion 89 deg, L knee flexion 102 deg          Gait training  10'  x Raised walker height to promote increased posture, cues for exaggerated HS curl knee flexion during swing. NK Table         Specific Interventions Next Treatment: Progress seated and standing LE strengthening and emphasize B knee AROM; progress HEP. NK table when able. Continue providing gait and stair training, discussion regarding transfers and LE alignment to increased functional knee bend. Activity/Treatment Tolerance:  [x]  Patient tolerated treatment well  []  Patient limited by fatigue  []  Patient limited by pain   []  Patient limited by medical complications  []  Other:     Assessment:  Patient presented to therapy more painful bilaterally today and with more swelling which is likely attributed to increased activity and patient doing HEP more than prescribed. Patient was advised to decrease HEP to 2x/day and focus more on increasing functional movement and improved walking. Patient had good tolerance to treatment today and was able to complete closer seat on NuStep today and improved step up technique was noted with decreased hip hiking and circumduction noted. May need to hold off on measuring AROM every visit as patient becomes upset when numbers don't continue moving in positive direction. Patient was educated on the natural variance that is normally seen.      Body Structures/Functions/Activity Limitations: edema, impaired activity tolerance, impaired balance, impaired endurance, impaired motor control, impaired ROM, impaired strength, pain and abnormal gait  Prognosis: good    GOALS:  Patient Goal: Walk without AD, return to work. Short Term Goals:  Time Frame: 6 weeks    1. Patient will report reduction of B knee pain to less than 2/10 with ability to ascend/descend stairs and perform sit<>stand transfers. 2.  Patient will demonstrate increased B knee AROM to 0-120 degrees to improve car transfer and normalize gait pattern. 3. Patient will be able to perform indep sit<>stand transfer from all surfaces without need for UE support. Long Term Goals:  Time Frame: 12 weeks     Patient will demonstrate increased LE strength to greater than or equal to 4+/5 to improve overall standing tolerance and allow patient to return to work. 2.  Patient will demonstrate improved function on LEFS with score of >65/80. 3.  Patient will be independent with comprehensive HEP. 4.  Patient will ambulate without antalgia, unlimited community distances without need for AD demonstrating normalized knee mechanics. Patient Education:   []  HEP/Education Completed: Issued updated handouts for standing program today. [facility] [employer] [facility] ([employer] Access Code: Issued handouts but did not provide electronic log in information. []  No new Education completed  [x]  Reviewed Prior HEP      [x]  Patient verbalized and/or demonstrated understanding of education provided. []  Patient unable to verbalize and/or demonstrate understanding of education provided. Will continue education.   []  Barriers to learning:     PLAN:  Treatment Recommendations: Strengthening, Range of Motion, Balance Training, Functional Mobility Training, Transfer Training, Endurance Training, Gait Training, Stair Training, Neuromuscular Re-education, Manual Therapy - Soft Tissue Mobilization, Pain Management, Home Exercise Program, Patient Education, Safety Education and Training, Positioning, Aquatics and Modalities    [] Plan of care initiated. Plan to see patient 3 times per week for 12 weeks to address the treatment planned outlined above.   [x]  Continue with current plan of care   []  Modify plan of care as follows:    []  Hold pending physician visit  []  Discharge    Time In 1130   Time Out 1215   Timed Code Minutes: 45 min   Total Treatment Time: 45 min       Electronically Signed by: Taunya Phoenix PT, DPT [id number]

## 2021-01-11 ENCOUNTER — HOSPITAL ENCOUNTER (OUTPATIENT)
Dept: PHYSICAL THERAPY | Age: 56
Setting detail: THERAPIES SERIES
Discharge: HOME | End: 2021-01-11
Payer: COMMERCIAL

## 2021-01-11 PROCEDURE — 97110 THERAPEUTIC EXERCISES: CPT

## 2021-01-11 PROCEDURE — 97116 GAIT TRAINING THERAPY: CPT

## 2021-01-11 NOTE — PROGRESS NOTES
[facility]  PHYSICAL THERAPY  [] EVALUATION  [x] DAILY NOTE (LAND) [] DAILY NOTE (AQUATIC ) [] PROGRESS NOTE [] DISCHARGE NOTE    [x] OUTPATIENT REHABILITATION [facility]   [] [place] [address]    [] [facility]   [] [place] [place]    Date: 2021  Patient Name:  Estevan Kim  : 1965  MRN: [id number]  CSN: [id number]    Referring Practitioner Geoff Bello MD   Diagnosis Unilateral primary osteoarthritis, right knee [M17.11]  Unilateral primary osteoarthritis, left knee [M17.12]  Presence of right artificial knee joint [Z96.651]  Presence of left artificial knee joint [Z96.652]    Treatment Diagnosis R knee pain, L knee pain, R knee stiffness, L knee stiffness, Muscle weakness, Difficulty walking, Edema, Unsteadiness   Date of Evaluation 20    Additional Pertinent History Hyperlipidemia, anxiety, obesity, lumbar surgery, hx of B knee scope 2019      Functional Outcome Measure Used LEFS   Functional Outcome Score 80 (20); 4 (20)       Insurance: Primary: Payor: Andree Vasquez /  /  / ,   Secondary: [facility]   Authorization Information: [employer]; Aquatics, modalities and telehealth covered, NO IONTO   Visit # 7, 4/10 for progress note 14 visits total approved. Visits Allowed: 2 visits 20 TO 21, Reauthorized for 11 more visits from  through 3/28/21   Covered codes: 05276, 12104, [id number], [phone], [id number], 97118, 61104   Recertification Date: 1793   Physician Follow-Up: 2020   Physician Orders:    History of Present Illness: Patient presents status post B TKA performed 2020 by Dr. Esteban Russ at [facility]. Patient reports having overnight stay until 2020 in the evening. Patient reports having difficulty with pain control since surgery and is using Norco, Tramadol, and a muscle relaxer with ibuprofen PRN. Patient continues to use ice as often as possible.  Patient is using CPM 6-8 hours per day per leg at this point and was told to do this routine for 30 days. Patient reports current settings of -4 extension and 60 deg flexion. Patient reports insurance will not cover it for that long. Patient reports greatest functional difficulty with transfers, has no chairs in her home that are firm surface with armrests to push up so is having family assistance for transfers and needed therapist assistance from the waiting room chair to get up for eval. Patient reports she is sleeping in recliner, on her back with pillows under ankles only. Patient is having low back pain from abnormal walking pattern. Patient had difficulty with stairs as well. She is wearing B PRITI hose. Patient denies calf pain and is doing HEP from hospital. In August 2019 she had B knee scopes for torn meniscus and pain was persistent so replacement was opted for. SUBJECTIVE:  Patient experienced soreness into the next day after last treatment. Patient got a refill of her pain medication and continues to take them every 4 hours on therapy days. Patient remains compliant with HEP and has been focusing on knee bending. Icing to reduce swelling and overnight while sleeping. Patient continues to have one location of drainage from R knee incision (lower 1/3).      TREATMENT   Precautions:    Pain: 5/10 R knee, 5/10 L knee    X in shaded column indicates activity completed today   Modalities Parameters/  Location  Notes                     Manual Therapy Time/Technique  Notes                     Exercise/Intervention   Notes   Quad sets 15B 3\"  Tapping tactile cues   Heelslides 10B      Heelslides with strap 10B 5\" x L knee 97 deg AAROM, R knee 96 deg AAROM   SAQ 5B 3\"     Supine Hip Abduction 10B      SLR 10B  x    VMO SLR 10B  X    Heel Prop    R knee lacking 4 deg from neutral; L knee lacking 3 deg from neutral   Seated knee flexion stretch with opp leg assist 1B 10 sec X    LAQ 10B      Seated HS curl Heelslide 15B 3\" x           Standing HR/TR 20B  x BUE support   Standing march 20B  x Alternating with BUE support   Standing 3-way hip 20B  x BUE support   Standing HS curls 20B  X Postural cues   Mini squats 15  x    TKE 15B 5 sec X Blue band issued   NuStep  5 min Level 2  Seat 9, no arms   Step Ups (fwd/lat) 15B each way 6 inch X BUE support; cues to avoid hip hike and circumduction    Standing HS and knee flexion stretch (lunging forward with foot on step) 2B 20 sec X R knee flexion 89 deg, L knee flexion 102 deg   Flexion stretch with box behind patient and dorsum of foot resting on it. 2B 20 sec X    Gait training  10'  x With straight cane with emphasis on increasing knee flexion on R>L knee and on achieving heelstrike bilaterally. Cues for relaxed shoulders and avoidance of forward flexed posture. NK Table         Specific Interventions Next Treatment: Progress seated and standing LE strengthening and emphasize B knee AROM; progress HEP. NK table when able. Activity/Treatment Tolerance:  [x]  Patient tolerated treatment well  []  Patient limited by fatigue  []  Patient limited by pain   []  Patient limited by medical complications  []  Other:     Assessment:  Patient demonstrates improved standing HS curl technique today and was able to tolerate additional posterior knee flexion stretch. Patient's ROM with supine heelslide increased from last visit but has been slow to improve. Therapist visualized that in weight bearing positions, patient can achieve greater flexion ROM compared to supine heelslide. Gait with cane was slower and more guarded but steady and patient was instructed to transition to cane at this time. GOALS:  Patient Goal: Walk without AD, return to work. Short Term Goals:  Time Frame: 6 weeks    1. Patient will report reduction of B knee pain to less than 2/10 with ability to ascend/descend stairs and perform sit<>stand transfers.      2.  Patient will demonstrate increased B knee AROM to 0-120 degrees to improve car transfer and normalize gait pattern. 3. Patient will be able to perform indep sit<>stand transfer from all surfaces without need for UE support. Long Term Goals:  Time Frame: 12 weeks     Patient will demonstrate increased LE strength to greater than or equal to 4+/5 to improve overall standing tolerance and allow patient to return to work. 2.  Patient will demonstrate improved function on LEFS with score of >65/80. 3.  Patient will be independent with comprehensive HEP. 4.  Patient will ambulate without antalgia, unlimited community distances without need for AD demonstrating normalized knee mechanics. Patient Education:   []  HEP/Education Completed: Issued updated handouts for standing program today. [employer] Access Code: Issued handouts but did not provide electronic log in information. []  No new Education completed  [x]  Reviewed Prior HEP      [x]  Patient verbalized and/or demonstrated understanding of education provided. []  Patient unable to verbalize and/or demonstrate understanding of education provided. Will continue education. []  Barriers to learning:     PLAN:  Treatment Recommendations: Strengthening, Range of Motion, Balance Training, Functional Mobility Training, Transfer Training, Endurance Training, Gait Training, Stair Training, Neuromuscular Re-education, Manual Therapy - Soft Tissue Mobilization, Pain Management, Home Exercise Program, Patient Education, Safety Education and Training, Positioning, Aquatics and Modalities    []  Plan of care initiated. Plan to see patient 3 times per week for 12 weeks to address the treatment planned outlined above.   [x]  Continue with current plan of care   []  Modify plan of care as follows:    []  Hold pending physician visit  []  Discharge    Time In 1435   Time Out 1517   Timed Code Minutes: 42 min   Total Treatment Time: 42 min       Electronically Signed by: Horacio Koehler PT, DPT [id number]

## 2021-01-13 ENCOUNTER — HOSPITAL ENCOUNTER (OUTPATIENT)
Dept: PHYSICAL THERAPY | Age: 56
Setting detail: THERAPIES SERIES
Discharge: HOME | End: 2021-01-13
Payer: COMMERCIAL

## 2021-01-13 PROCEDURE — 97110 THERAPEUTIC EXERCISES: CPT

## 2021-01-13 NOTE — PROGRESS NOTES
[facility]  PHYSICAL THERAPY  [] EVALUATION  [x] DAILY NOTE (LAND) [] DAILY NOTE (AQUATIC ) [] PROGRESS NOTE [] DISCHARGE NOTE    [x] OUTPATIENT REHABILITATION CENTER [facility]   [] [place] [address]    [] [facility]   [] Carol Nieto    Date: 2021  Patient Name:  Surya Sandoval  : 1965  MRN: [id number]  CSN: [id number]    Referring Practitioner Alex Casas MD   Diagnosis Unilateral primary osteoarthritis, right knee [M17.11]  Unilateral primary osteoarthritis, left knee [M17.12]  Presence of right artificial knee joint [Z96.651]  Presence of left artificial knee joint [Z96.652]    Treatment Diagnosis R knee pain, L knee pain, R knee stiffness, L knee stiffness, Muscle weakness, Difficulty walking, Edema, Unsteadiness   Date of Evaluation 20    Additional Pertinent History Hyperlipidemia, anxiety, obesity, lumbar surgery, hx of B knee scope 2019      Functional Outcome Measure Used LEFS   Functional Outcome Score  (20);  (20)       Insurance: Primary: Payor: [place] /  /  / ,   Secondary: [facility]   Authorization Information: [employer]; Aquatics, modalities and telehealth covered, NO IONTO   Visit # 8, 5/10 for progress note 14 visits total approved. Visits Allowed: 2 visits 20 TO 21, Reauthorized for 11 more visits from  through 3/28/21   Covered codes: 68904, 13824, [phone], [phone], [address], [address], [address]   Recertification Date: 3/51/0403   Physician Follow-Up: 2020   Physician Orders:    History of Present Illness: Patient presents status post B TKA performed 2020 by Dr. Leobardo Mcclain at [facility]. Patient reports having overnight stay until 2020 in the evening. Patient reports having difficulty with pain control since surgery and is using Norco, Tramadol, and a muscle relaxer with ibuprofen PRN. Patient continues to use ice as often as possible.  Patient is using CPM 6-8 hours per day per leg at this point and was told to do this routine for 30 days. Patient reports current settings of -4 extension and 60 deg flexion. Patient reports insurance will not cover it for that long. Patient reports greatest functional difficulty with transfers, has no chairs in her home that are firm surface with armrests to push up so is having family assistance for transfers and needed therapist assistance from the waiting room chair to get up for eval. Patient reports she is sleeping in recliner, on her back with pillows under ankles only. Patient is having low back pain from abnormal walking pattern. Patient had difficulty with stairs as well. She is wearing B PRITI hose. Patient denies calf pain and is doing HEP from hospital. In August 2019 she had B knee scopes for torn meniscus and pain was persistent so replacement was opted for. SUBJECTIVE:  Patient presented walking with straight cane and reports she has stopped using the walker completely. Within the home she has been taking some steps without any AD, holding onto furniture intermittently. Patient rates B knee pain 5/10 today. Patient continues to have difficulty with sleep.        TREATMENT   Precautions:    Pain: 5/10 R knee, 5/10 L knee    X in shaded column indicates activity completed today   Modalities Parameters/  Location  Notes                     Manual Therapy Time/Technique  Notes                     Exercise/Intervention   Notes   Heelslides 10B      Heelslides with strap 10B 5\" x L knee 100 deg AAROM, R knee 98 deg AAROM   SAQ 5B 3\"     Supine Hip Abduction 10B      SLR 10B      VMO SLR 10B  X    Heel Prop    R knee lacking 3 deg from neutral; L knee lacking 2 deg from neutral   Hooklying Knee Flexion Stretch 3B 20\" X Therapist performing passively   Seated knee flexion stretch with opp leg assist 1B 10 sec X    LAQ 10B      Seated HS curl Heelslide 15B 3\" x           Standing HR/TR 20B  x BUE support   Standing march 20B  x Alternating with BUE support   Standing 3-way hip 20B  x BUE support   Standing HS curls 20B  X    Mini squats 20  x    TKE 15B 5 sec X Blue band issued   NuStep  5 min Level 2 X Seat 9, no arms   Step Ups (fwd/lat) 15B each way 6 inch X BUE support; cues to avoid hip hike and circumduction    Standing HS and knee flexion stretch (lunging forward with foot on 2nd step) 3B 20 sec X    Flexion stretch with box behind patient and dorsum of foot resting on it. 2B 20 sec X    Gait training  10'   With straight cane with emphasis on increasing knee flexion on R>L knee and on achieving heelstrike bilaterally. Cues for relaxed shoulders and avoidance of forward flexed posture. Sidestepping 50 ft ea way  X Holding straight cane    NK Table       Stair training 2x  X Ascending and descending stairs twice with B railings- patient able to complete reciprocally with pain. Specific Interventions Next Treatment: Add foam to standing strengthening exercises to challenge balance. NK table when able. Activity/Treatment Tolerance:  [x]  Patient tolerated treatment well  []  Patient limited by fatigue  []  Patient limited by pain   []  Patient limited by medical complications  []  Other:     Assessment:  Patient was able to climb 8 steps with B railings today in reciprocal pattern. Moderate pain was reported but patient did have good control for descent. Gait is now with straight cane and patient is demonstrating more fluid motion, increased gait speed, and slight increase in knee flexion R>L compared to with walker. Addition of sidestepping was well tolerated but frontal plane weakness and instability was evident, requiring therapist be at [place] [address] for safety. Patient continues to experience moderate pain complaints along lateral joint lines. GOALS:  Patient Goal: Walk without AD, return to work. Short Term Goals:  Time Frame: 6 weeks    1.  Patient will report reduction of B knee pain to less than 2/10 with ability to ascend/descend stairs and perform sit<>stand transfers. 2.  Patient will demonstrate increased B knee AROM to 0-120 degrees to improve car transfer and normalize gait pattern. 3. Patient will be able to perform indep sit<>stand transfer from all surfaces without need for UE support. Long Term Goals:  Time Frame: 12 weeks     Patient will demonstrate increased LE strength to greater than or equal to 4+/5 to improve overall standing tolerance and allow patient to return to work. 2.  Patient will demonstrate improved function on LEFS with score of >65/80. 3.  Patient will be independent with comprehensive HEP. 4.  Patient will ambulate without antalgia, unlimited community distances without need for AD demonstrating normalized knee mechanics. Patient Education:   []  HEP/Education Completed: Issued updated handouts for standing program today. [employer] Access Code: Issued handouts but did not provide electronic log in information. []  No new Education completed  [x]  Reviewed Prior HEP      [x]  Patient verbalized and/or demonstrated understanding of education provided. []  Patient unable to verbalize and/or demonstrate understanding of education provided. Will continue education. []  Barriers to learning:     PLAN:  Treatment Recommendations: Strengthening, Range of Motion, Balance Training, Functional Mobility Training, Transfer Training, Endurance Training, Gait Training, Stair Training, Neuromuscular Re-education, Manual Therapy - Soft Tissue Mobilization, Pain Management, Home Exercise Program, Patient Education, Safety Education and Training, Positioning, Aquatics and Modalities    []  Plan of care initiated. Plan to see patient 3 times per week for 12 weeks to address the treatment planned outlined above.   [x]  Continue with current plan of care   []  Modify plan of care as follows:    []  Hold pending physician visit  []  Discharge    Time In 1300   Time Out 1346 Timed Code Minutes: 46 min   Total Treatment Time: 46 min       Electronically Signed by: Carlota Quijano PT, DPT [id number]

## 2021-01-14 ENCOUNTER — HOSPITAL ENCOUNTER (OUTPATIENT)
Dept: PHYSICAL THERAPY | Age: 56
Setting detail: THERAPIES SERIES
Discharge: HOME | End: 2021-01-14
Payer: COMMERCIAL

## 2021-01-14 PROCEDURE — 97110 THERAPEUTIC EXERCISES: CPT

## 2021-01-14 NOTE — PROGRESS NOTES
point and was told to do this routine for 30 days. Patient reports current settings of -4 extension and 60 deg flexion. Patient reports insurance will not cover it for that long. Patient reports greatest functional difficulty with transfers, has no chairs in her home that are firm surface with armrests to push up so is having family assistance for transfers and needed therapist assistance from the waiting room chair to get up for eval. Patient reports she is sleeping in recliner, on her back with pillows under ankles only. Patient is having low back pain from abnormal walking pattern. Patient had difficulty with stairs as well. She is wearing B PRITI hose. Patient denies calf pain and is doing HEP from hospital. In August 2019 she had B knee scopes for torn meniscus and pain was persistent so replacement was opted for. SUBJECTIVE:  Patient reports that she was moderately sore after yesterday's visit and had significant difficulty sleeping although she believes that may be due in part to trying to sleep on her side for the first time since surgery. Patient tried multiple pillows between the knees and that was not helpful. Patient continues to take pain medication. Pain today is improved and rated 4-5/10. Patient did feel the passive knee flexion stretching therapist initiated yesterday was beneficial in advancing ROM and requests it be completed again.        TREATMENT   Precautions:    Pain: 4-5/10 R knee, 4-5/10 L knee    X in shaded column indicates activity completed today   Modalities Parameters/  Location  Notes                     Manual Therapy Time/Technique  Notes                     Exercise/Intervention   Notes   Heelslides 10B      Heelslides with strap 10B 5\" x L knee 102 deg AAROM, R knee 101 deg AAROM; cues to relax hip during measurement   SAQ 5B 3\"     Supine Hip Abduction 10B      SLR 10B      VMO SLR 10B  VR    Heel Prop    R knee lacking 3 deg from neutral; L knee lacking 2 deg from neutral Hooklying Knee Flexion Stretch 3B 20\" X Therapist performing passively   Seated knee flexion stretch with opp leg assist 1B 10 sec X    LAQ 10B      Seated HS curl Heelslide 15B 3\" x           Standing HR/TR 20B  x BUE support   Standing march 20B  x Alternating with BUE support   Standing 3-way hip 20B  x BUE support   Standing HS curls 20B  X    Mini squats 20  x    TKE 15B 5 sec  Blue band issued   NuStep  5 min Level 2 X Seat 8, no arms   Step Ups (fwd/lat) 15B each way 6 inch X BUE support; cues to avoid hip hike and circumduction    Standing HS and knee flexion stretch (lunging forward with foot on 2nd step) 3B 20 sec X    Flexion stretch with box behind patient and dorsum of foot resting on it. 2B 20 sec X    Gait training  10'   With straight cane with emphasis on increasing knee flexion on R>L knee and on achieving heelstrike bilaterally. Cues for relaxed shoulders and avoidance of forward flexed posture. Sidestepping 50 ft ea way  X Holding straight cane    NK Table       Stair training 3x  X Ascending and descending stairs twice with B railings- patient able to complete reciprocally with pain. Specific Interventions Next Treatment: Add foam to standing strengthening exercises to challenge balance. NK table when able. Try sidelying hip abduction next visit. Activity/Treatment Tolerance:  [x]  Patient tolerated treatment well  []  Patient limited by fatigue  []  Patient limited by pain   []  Patient limited by medical complications  []  Other:     Assessment:  Cued patient throughout standing program to minimize UE support on parallel bars to further challenge balance. Due to back to back therapy visits and soreness after last treatment, held addition of foam today for standing program. Patient remains most challenged with knee flexion exercises and needed cues for relaxation during hooklying knee flexion stretch to optimize tissue stretch and reduce pain.  Patient was able to ascend/descend full flight of stairs today. Patient was able to achieve >100 degrees B knee flexion today with supine heelslide. GOALS:  Patient Goal: Walk without AD, return to work. Short Term Goals:  Time Frame: 6 weeks    1. Patient will report reduction of B knee pain to less than 2/10 with ability to ascend/descend stairs and perform sit<>stand transfers. 2.  Patient will demonstrate increased B knee AROM to 0-120 degrees to improve car transfer and normalize gait pattern. 3. Patient will be able to perform indep sit<>stand transfer from all surfaces without need for UE support. Long Term Goals:  Time Frame: 12 weeks     Patient will demonstrate increased LE strength to greater than or equal to 4+/5 to improve overall standing tolerance and allow patient to return to work. 2.  Patient will demonstrate improved function on LEFS with score of >65/80. 3.  Patient will be independent with comprehensive HEP. 4.  Patient will ambulate without antalgia, unlimited community distances without need for AD demonstrating normalized knee mechanics. Patient Education:   []  HEP/Education Completed: Issued updated handouts for standing program today. [employer] Access Code: Issued handouts but did not provide electronic log in information. []  No new Education completed  [x]  Reviewed Prior HEP      [x]  Patient verbalized and/or demonstrated understanding of education provided. []  Patient unable to verbalize and/or demonstrate understanding of education provided. Will continue education.   []  Barriers to learning:     PLAN:  Treatment Recommendations: Strengthening, Range of Motion, Balance Training, Functional Mobility Training, Transfer Training, Endurance Training, Gait Training, Stair Training, Neuromuscular Re-education, Manual Therapy - Soft Tissue Mobilization, Pain Management, Home Exercise Program, Patient Education, Safety Education and Training, Positioning, Aquatics and

## 2021-01-18 ENCOUNTER — HOSPITAL ENCOUNTER (OUTPATIENT)
Dept: PHYSICAL THERAPY | Age: 56
Setting detail: THERAPIES SERIES
Discharge: HOME | End: 2021-01-18
Payer: COMMERCIAL

## 2021-01-18 PROCEDURE — 97110 THERAPEUTIC EXERCISES: CPT

## 2021-01-18 NOTE — PROGRESS NOTES
[address] [facility]  PHYSICAL THERAPY  [] EVALUATION  [x] DAILY NOTE (LAND) [] DAILY NOTE (AQUATIC ) [] PROGRESS NOTE [] DISCHARGE NOTE    [x] OUTPATIENT REHABILITATION [facility]   [] [place] [address]    [] [facility]   [] [facility]    Date: 2021  Patient Name:  Rudi Stark  : 1965  MRN: [id number]  CSN: [id number]    Referring Practitioner Xenia Avendaño MD   Diagnosis Unilateral primary osteoarthritis, right knee [M17.11]  Unilateral primary osteoarthritis, left knee [M17.12]  Presence of right artificial knee joint [Z96.651]  Presence of left artificial knee joint [Z96.652]    Treatment Diagnosis R knee pain, L knee pain, R knee stiffness, L knee stiffness, Muscle weakness, Difficulty walking, Edema, Unsteadiness   Date of Evaluation 20    Additional Pertinent History Hyperlipidemia, anxiety, obesity, lumbar surgery, hx of B knee scope 2019      Functional Outcome Measure Used LEFS   Functional Outcome Score  (20); 4 (20)       Insurance: Primary: Payor: George Casas [employer] /  /  / ,   Secondary:    Authorization Information: [employer]; Aquatics, modalities and telehealth covered, NO IONTO   Visit # 10, 7/10 for progress note 14 visits total approved. Visits Allowed: 2 visits 20 TO 21, Reauthorized for 11 more visits from  through 3/28/21   Covered codes: 78641, 86745, [phone], [phone], [id number], 37314, 01661   Recertification Date:    Physician Follow-Up: 2020   Physician Orders:    History of Present Illness: Patient presents status post B TKA performed 2020 by Dr. Edgar Longoria at [facility]. Patient reports having overnight stay until 2020 in the evening. Patient reports having difficulty with pain control since surgery and is using Norco, Tramadol, and a muscle relaxer with ibuprofen PRN. Patient continues to use ice as often as possible.  Patient is using CPM 6-8 hours per day per leg at this point and was told to do this routine for 30 days. Patient reports current settings of -4 extension and 60 deg flexion. Patient reports insurance will not cover it for that long. Patient reports greatest functional difficulty with transfers, has no chairs in her home that are firm surface with armrests to push up so is having family assistance for transfers and needed therapist assistance from the waiting room chair to get up for eval. Patient reports she is sleeping in recliner, on her back with pillows under ankles only. Patient is having low back pain from abnormal walking pattern. Patient had difficulty with stairs as well. She is wearing B PRITI hose. Patient denies calf pain and is doing HEP from hospital. In August 2019 she had B knee scopes for torn meniscus and pain was persistent so replacement was opted for. SUBJECTIVE: Patient reports that she woke up today with increased pain. She states that her pain is a 7/10 on both of her knees.      TREATMENT   Precautions:    Pain: 7/10 bilateral knees    X in shaded column indicates activity completed today   Modalities Parameters/  Location  Notes                     Manual Therapy Time/Technique  Notes                     Exercise/Intervention   Notes   Heelslides 10B      Heelslides with strap 10B 5\" X L knee 105 deg AAROM, R knee 102 deg AAROM; cues to relax hip during measurement   SAQ 5B 3\"     Supine Hip Abduction 10B      SLR 10B      VMO SLR 10B      Heel Prop    R knee lacking 3 deg from neutral; L knee lacking 2 deg from neutral   Hooklying Knee Flexion Stretch 3B 20\"  Therapist performing passively   Sidelying hip abduction        Seated knee flexion stretch with opp leg assist 1B 10 sec X    LAQ 10B      Seated HS curl Heelslide 15B 3\" X    Foam with standing exercises:        Standing HR/TR 20B  X BUE support   Standing march 20B  X Alternating with BUE support   Standing 3-way hip 20B  X BUE support   Standing HS curls 20B  X    Mini squats 20  X    TKE 15B 5 sec  Blue band issued   NuStep  5 min Level 2 X Seat 8, no arms   Step Ups (fwd/lat) 15B each way 6 inch X BUE support; cues to avoid hip hike and circumduction    Standing HS and knee flexion stretch (lunging forward with foot on 2nd step) 3B 20 sec X    Flexion stretch with box behind patient and dorsum of foot resting on it. 3B 20 sec X    Gait training  10'   With straight cane with emphasis on increasing knee flexion on R>L knee and on achieving heelstrike bilaterally. Cues for relaxed shoulders and avoidance of forward flexed posture. Sidestepping 50 ft ea way  X Holding straight cane    NK Table       Stair training 2x  X Ascending and descending stairs twice with B railings- patient able to complete reciprocally with pain. Specific Interventions Next Treatment: Add foam to standing strengthening exercises to challenge balance. NK table when able. Try sidelying hip abduction next visit. Activity/Treatment Tolerance:  [x]  Patient tolerated treatment well  []  Patient limited by fatigue  []  Patient limited by pain   []  Patient limited by medical complications  []  Other:     Assessment: Patient continued with therapeutic exercises as documented above. She required occasional cues on proper technique with exercises. Added foam with standing exercises. Patient had good tolerance to treatment session. Patient's knee flexion ROM measurements improved today. GOALS:  Patient Goal: Walk without AD, return to work. Short Term Goals:  Time Frame: 6 weeks    1. Patient will report reduction of B knee pain to less than 2/10 with ability to ascend/descend stairs and perform sit<>stand transfers. 2.  Patient will demonstrate increased B knee AROM to 0-120 degrees to improve car transfer and normalize gait pattern. 3. Patient will be able to perform indep sit<>stand transfer from all surfaces without need for UE support.        Long Term Goals:  Time Frame: 12 weeks     Patient will demonstrate increased LE strength to greater than or equal to 4+/5 to improve overall standing tolerance and allow patient to return to work. 2.  Patient will demonstrate improved function on LEFS with score of >65/80. 3.  Patient will be independent with comprehensive HEP. 4.  Patient will ambulate without antalgia, unlimited community distances without need for AD demonstrating normalized knee mechanics. Patient Education:   [x]  HEP/Education Completed: Educated on added foam with standing therapeutic exercises. [employer] Access Code: Issued handouts but did not provide electronic log in information. []  No new Education completed  [x]  Reviewed Prior HEP      [x]  Patient verbalized and/or demonstrated understanding of education provided. []  Patient unable to verbalize and/or demonstrate understanding of education provided. Will continue education. []  Barriers to learning:     PLAN:  Treatment Recommendations: Strengthening, Range of Motion, Balance Training, Functional Mobility Training, Transfer Training, Endurance Training, Gait Training, Stair Training, Neuromuscular Re-education, Manual Therapy - Soft Tissue Mobilization, Pain Management, Home Exercise Program, Patient Education, Safety Education and Training, Positioning, Aquatics and Modalities    []  Plan of care initiated. Plan to see patient 3 times per week for 12 weeks to address the treatment planned outlined above.   [x]  Continue with current plan of care   []  Modify plan of care as follows:    []  Hold pending physician visit   []  Discharge    Time In 1502   Time Out 1546   Timed Code Minutes: 44 min   Total Treatment Time: 44 min       Electronically Signed by: Dustin Hall

## 2021-01-20 ENCOUNTER — HOSPITAL ENCOUNTER (OUTPATIENT)
Dept: PHYSICAL THERAPY | Age: 56
Setting detail: THERAPIES SERIES
Discharge: HOME | End: 2021-01-20
Payer: COMMERCIAL

## 2021-01-20 PROCEDURE — 97110 THERAPEUTIC EXERCISES: CPT

## 2021-01-20 NOTE — PROGRESS NOTES
[facility]  PHYSICAL THERAPY  [] EVALUATION  [x] DAILY NOTE (LAND) [] DAILY NOTE (AQUATIC ) [] PROGRESS NOTE [] DISCHARGE NOTE    [x] OUTPATIENT REHABILITATION CENTER [facility]   [] [place] [address]    [] [facility]   [] Yudy Amaya    Date: 2021  Patient Name:  Adilson Reynolds  : 1965  MRN: [id number]  CSN: [id number]    Referring Practitioner Tommy Alcala MD   Diagnosis Unilateral primary osteoarthritis, right knee [M17.11]  Unilateral primary osteoarthritis, left knee [M17.12]  Presence of right artificial knee joint [Z96.651]  Presence of left artificial knee joint [Z96.652]    Treatment Diagnosis R knee pain, L knee pain, R knee stiffness, L knee stiffness, Muscle weakness, Difficulty walking, Edema, Unsteadiness   Date of Evaluation 20    Additional Pertinent History Hyperlipidemia, anxiety, obesity, lumbar surgery, hx of B knee scope 2019      Functional Outcome Measure Used LEFS   Functional Outcome Score  (20);  (20)       Insurance: Primary: Payor: Delfino Reid /  /  / ,   Secondary:    Authorization Information: [employer]; Aquatics, modalities and telehealth covered, NO IONTO   Visit # 71, 8/10 for progress note 14 visits total approved. Visits Allowed: 2 visits 20 TO 21, Reauthorized for 11 more visits from  through 3/28/21   Covered codes: 80639, 53572, 0664 334 28 67, [id number], [address], [address], [address]   Recertification Date:    Physician Follow-Up: 2020   Physician Orders:    History of Present Illness: Patient presents status post B TKA performed 2020 by Dr. Karthik Bach at [facility]. Patient reports having overnight stay until 2020 in the evening. Patient reports having difficulty with pain control since surgery and is using Norco, Tramadol, and a muscle relaxer with ibuprofen PRN. Patient continues to use ice as often as possible.  Patient is using CPM 6-8 hours per day per leg at this point and was told to do this routine for 30 days. Patient reports current settings of -4 extension and 60 deg flexion. Patient reports insurance will not cover it for that long. Patient reports greatest functional difficulty with transfers, has no chairs in her home that are firm surface with armrests to push up so is having family assistance for transfers and needed therapist assistance from the waiting room chair to get up for eval. Patient reports she is sleeping in recliner, on her back with pillows under ankles only. Patient is having low back pain from abnormal walking pattern. Patient had difficulty with stairs as well. She is wearing B PRITI hose. Patient denies calf pain and is doing HEP from hospital. In August 2019 she had B knee scopes for torn meniscus and pain was persistent so replacement was opted for. SUBJECTIVE: Patient notes her knees aren't doing too bad today but does feel her knees are more swollen today due to increased activity levels. Patient is hopeful to continue working on her knee range of motion.       TREATMENT   Precautions:    Pain: 5/10 bilateral knees    X in shaded column indicates activity completed today   Modalities Parameters/  Location  Notes                     Manual Therapy Time/Technique  Notes                     Exercise/Intervention   Notes   Heelslides 10B      Heelslides with strap 10B 5\"  L knee 105 deg AAROM, R knee 102 deg AAROM; cues to relax hip during measurement   SAQ 5B 3\"     Side lying  Hip Abduction 10B      SLR 10B      VMO SLR 10B      Heel Prop    R knee lacking 3 deg from neutral; L knee lacking 2 deg from neutral   Hook-lying knee flexion stretch 3B 20\"  Therapist performing passively   Supine knee flexion wall slide  10 B 5 seconds X R =105 degrees L=108 degrees    Seated knee flexion stretch with opp leg assist 1B 10 sec     LAQ 10B      Seated HS curl Heelslide 15B 3\"     Foam with standing exercises:        Standing HR/TR 20B  X BUE support   Standing march 20B  X Alternating with BUE support   Standing 3-way hip 20B  X BUE support   Standing HS curls 20B  X    Mini squats 20  X    TKE 15B 5 sec  Blue band issued   NuStep  6 min Level 2 X Seat 9, no arms   Step Ups (fwd/lat) 20B each way 6 inch X BUE support; cues to avoid hip hike and circumduction    Standing HS and knee flexion stretch (lunging forward with foot on 2nd step) 3B 20 sec X    Flexion stretch with box and airex behind patient and dorsum of foot resting on it. 3B 20 sec X    Gait training  10'   With straight cane with emphasis on increasing knee flexion on R>L knee and on achieving heelstrike bilaterally. Cues for relaxed shoulders and avoidance of forward flexed posture. Dynamic gait: side stepping, marches, retro, and HS curls in PB X 2 laps ew  X    NK Table       Stair training 2x  X Ascending and descending stairs twice with B railings- patient able to complete reciprocally with pain. Specific Interventions Next Treatment: Add foam to standing strengthening exercises to challenge balance. NK table when able. Try sidelying hip abduction next visit. Activity/Treatment Tolerance:  [x]  Patient tolerated treatment well  []  Patient limited by fatigue  []  Patient limited by pain   []  Patient limited by medical complications  []  Other:     Assessment: Patient is able to tolerate today's treatment session and exercise progressions well. Patient is challenged with dynamic gait tasks from a balance standpoint. GOALS:  Patient Goal: Walk without AD, return to work. Short Term Goals:  Time Frame: 6 weeks    1. Patient will report reduction of B knee pain to less than 2/10 with ability to ascend/descend stairs and perform sit<>stand transfers. 2.  Patient will demonstrate increased B knee AROM to 0-120 degrees to improve car transfer and normalize gait pattern.      3. Patient will be able to perform indep sit<>stand transfer from all surfaces without need for UE support. Long Term Goals:  Time Frame: 12 weeks     Patient will demonstrate increased LE strength to greater than or equal to 4+/5 to improve overall standing tolerance and allow patient to return to work. 2.  Patient will demonstrate improved function on LEFS with score of >65/80. 3.  Patient will be independent with comprehensive HEP. 4.  Patient will ambulate without antalgia, unlimited community distances without need for AD demonstrating normalized knee mechanics. Patient Education:   [x]  HEP/Education Completed: wall slides if able     [email or web address] Access Code: Issued handouts but did not provide electronic log in information. []  No new Education completed  [x]  Reviewed Prior HEP      [x]  Patient verbalized and/or demonstrated understanding of education provided. []  Patient unable to verbalize and/or demonstrate understanding of education provided. Will continue education. []  Barriers to learning:     PLAN:  Treatment Recommendations: Strengthening, Range of Motion, Balance Training, Functional Mobility Training, Transfer Training, Endurance Training, Gait Training, Stair Training, Neuromuscular Re-education, Manual Therapy - Soft Tissue Mobilization, Pain Management, Home Exercise Program, Patient Education, Safety Education and Training, Positioning, Aquatics and Modalities    []  Plan of care initiated. Plan to see patient 3 times per week for 12 weeks to address the treatment planned outlined above.   [x]  Continue with current plan of care   []  Modify plan of care as follows:    []  Hold pending physician visit   []  Discharge    Time In 1500   Time Out 1546   Timed Code Minutes: 46 min   Total Treatment Time: 46 min       Electronically Signed by: Sarah Bryan

## 2021-01-21 ENCOUNTER — HOSPITAL ENCOUNTER (OUTPATIENT)
Dept: PHYSICAL THERAPY | Age: 56
Setting detail: THERAPIES SERIES
Discharge: HOME | End: 2021-01-21
Payer: COMMERCIAL

## 2021-01-21 PROCEDURE — 97110 THERAPEUTIC EXERCISES: CPT

## 2021-01-21 NOTE — PROGRESS NOTES
[address] [facility]  PHYSICAL THERAPY  [] EVALUATION  [x] DAILY NOTE (LAND) [] DAILY NOTE (AQUATIC ) [] PROGRESS NOTE [] DISCHARGE NOTE     [x] OUTPATIENT REHABILITATION [facility]   [] [place] [address]    [] [facility]   [] Byron Crespon    Date: 2021  Patient Name:  Ron Amato  : 1965  MRN: [id number]  CSN: [id number]    Referring Practitioner Mateus Hassan MD   Diagnosis Unilateral primary osteoarthritis, right knee [M17.11]  Unilateral primary osteoarthritis, left knee [M17.12]  Presence of right artificial knee joint [Z96.651]  Presence of left artificial knee joint [Z96.652]    Treatment Diagnosis R knee pain, L knee pain, R knee stiffness, L knee stiffness, Muscle weakness, Difficulty walking, Edema, Unsteadiness   Date of Evaluation 20    Additional Pertinent History Hyperlipidemia, anxiety, obesity, lumbar surgery, hx of B knee scope 2019      Functional Outcome Measure Used LEFS   Functional Outcome Score  (20);  (20)       Insurance: Primary: Payor: [facility] /  /  / ,   Secondary:    Authorization Information: [employer]; Aquatics, modalities and telehealth covered, NO IONTO   Visit # 12, 9/10 for progress note 14 visits total approved. Visits Allowed: 2 visits 20 TO 21, Reauthorized for 11 more visits from  through 3/28/21   Covered codes: 82451, 11350, 0664 334 28 [phone], [phone], [address], [address], [address]   Recertification Date: 3/57/7586   Physician Follow-Up: 2020   Physician Orders:    History of Present Illness: Patient presents status post B TKA performed 2020 by Dr. Loren Mccollum at [facility]. Patient reports having overnight stay until 2020 in the evening. Patient reports having difficulty with pain control since surgery and is using Norco, Tramadol, and a muscle relaxer with ibuprofen PRN. Patient continues to use ice as often as possible.  Patient is using CPM 6-8 hours per day per leg at this point and was told to do this routine for 30 days. Patient reports current settings of -4 extension and 60 deg flexion. Patient reports insurance will not cover it for that long. Patient reports greatest functional difficulty with transfers, has no chairs in her home that are firm surface with armrests to push up so is having family assistance for transfers and needed therapist assistance from the waiting room chair to get up for eval. Patient reports she is sleeping in recliner, on her back with pillows under ankles only. Patient is having low back pain from abnormal walking pattern. Patient had difficulty with stairs as well. She is wearing B PRITI hose. Patient denies calf pain and is doing HEP from hospital. In August 2019 she had B knee scopes for torn meniscus and pain was persistent so replacement was opted for. SUBJECTIVE: Patient notes increased swelling in both knees and applied ice for most of the night. Patient notes swelling improved about 75% coming into today's appointment. Good compliance with home program. Patient requested to ice after today's appointment as she has another doctor's appointment and won't be able to at home.      TREATMENT   Precautions:    Pain: 4/10 bilateral knees    X in shaded column indicates activity completed today   Modalities Parameters/  Location  Notes         CP to B knee  15 minutes  X          Manual Therapy Time/Technique  Notes                     Exercise/Intervention   Notes   Heelslides 10B      Heelslides with strap 10B 5\"  L knee 105 deg AAROM, R knee 102 deg AAROM; cues to relax hip during measurement   SAQ 5B 3\"     Side lying  Hip Abduction 10B      SLR 10B      VMO SLR 10B      Heel Prop    R knee lacking 3 deg from neutral; L knee lacking 2 deg from neutral   Hook-lying knee flexion stretch 3B 20\"  Therapist performing passively   Supine knee flexion wall slide  10 B 5 seconds X R =104 degrees L=109 degrees    Seated knee flexion stretch with opp leg assist 1B 10 sec     LAQ 10B      Seated HS curl Heelslide 15B 3\"     Foam with standing exercises:        Standing HR/TR 20B   BUE support   Standing march 20B   Alternating with BUE support   Standing 3-way hip 20B   BUE support   Standing HS curls 20B      Mini squats 20      TKE 15B 5 sec  Blue band issued   NuStep  5 min Level 2 X Seat 8, no arms   Step Ups (fwd/lat) 20B each way 6 inch X BUE support; cues to avoid hip hike and circumduction    Standing HS and knee flexion stretch (lunging forward with foot on 2nd step) 3B 20 sec X    Flexion stretch with box and airex behind patient and dorsum of foot resting on it. 3B 20 sec X On 2nd step this date due to availability of PB   Gait training  10'   With straight cane    Dynamic gait: side stepping, marches, retro, and HS curls in PB X 2 laps ew      NK Table x10 B ew  X No weight with extension, 2.5# with flexion          Standing gastroc stretch on step 2x 20 B  X                  Stair training 2x  X Ascending and descending stairs twice with B railings- patient able to complete reciprocally with pain. Specific Interventions Next Treatment: Add foam to standing strengthening exercises to challenge balance. NK table when able. Try sidelying hip abduction next visit. Activity/Treatment Tolerance:  [x]  Patient tolerated treatment well  []  Patient limited by fatigue  []  Patient limited by pain   []  Patient limited by medical complications  []  Other:     Assessment: Patient is making steady progress at this time demonstrating improved controlled descent with stair negotiation but does continue to rely on increased B UE support. Focused treatment session on range of motion and step activities. Plan to re-initiate standing exercises on compliant surface and dynamic gait tasks next visit. GOALS:  Patient Goal: Walk without AD, return to work. Short Term Goals:  Time Frame: 6 weeks    1.  Patient will report reduction of B knee pain to less than 2/10 with ability to ascend/descend stairs and perform sit<>stand transfers. 2.  Patient will demonstrate increased B knee AROM to 0-120 degrees to improve car transfer and normalize gait pattern. 3. Patient will be able to perform indep sit<>stand transfer from all surfaces without need for UE support. Long Term Goals:  Time Frame: 12 weeks     Patient will demonstrate increased LE strength to greater than or equal to 4+/5 to improve overall standing tolerance and allow patient to return to work. 2.  Patient will demonstrate improved function on LEFS with score of >65/80. 3.  Patient will be independent with comprehensive HEP. 4.  Patient will ambulate without antalgia, unlimited community distances without need for AD demonstrating normalized knee mechanics. Patient Education:   [x]  HEP/Education Completed: wall slides if able     [employer] Access Code: Issued handouts but did not provide electronic log in information. []  No new Education completed  [x]  Reviewed Prior HEP      [x]  Patient verbalized and/or demonstrated understanding of education provided. []  Patient unable to verbalize and/or demonstrate understanding of education provided. Will continue education. []  Barriers to learning:     PLAN:  Treatment Recommendations: Strengthening, Range of Motion, Balance Training, Functional Mobility Training, Transfer Training, Endurance Training, Gait Training, Stair Training, Neuromuscular Re-education, Manual Therapy - Soft Tissue Mobilization, Pain Management, Home Exercise Program, Patient Education, Safety Education and Training, Positioning, Aquatics and Modalities    []  Plan of care initiated. Plan to see patient 3 times per week for 12 weeks to address the treatment planned outlined above.   [x]  Continue with current plan of care   []  Modify plan of care as follows:    []  Hold pending physician visit   []  Discharge    Time In 0850   Time Out 0945   Timed Code Minutes: 40 min   Total Treatment Time: 55 min       Electronically Signed by: Shailesh Vazquez PTA

## 2021-01-25 ENCOUNTER — HOSPITAL ENCOUNTER (OUTPATIENT)
Dept: PHYSICAL THERAPY | Age: 56
Setting detail: THERAPIES SERIES
Discharge: HOME | End: 2021-01-25
Payer: COMMERCIAL

## 2021-01-25 PROCEDURE — 97110 THERAPEUTIC EXERCISES: CPT

## 2021-01-25 NOTE — FLOWSHEET NOTE
more visits from 12/29 through 3/28/21   Covered codes: [id number], [phone], [facility], [phone], [id number], [id number], [id number]   Recertification Date: 5/42/7345   Physician Follow-Up: 3/15/2021   Physician Orders:    History of Present Illness: Patient presents status post B TKA performed 12/11/2020 by Dr. Severo Sensor at [facility]. Patient reports having overnight stay until 12/14/2020 in the evening. Patient reports having difficulty with pain control since surgery and is using Norco, Tramadol, and a muscle relaxer with ibuprofen PRN. Patient continues to use ice as often as possible. Patient is using CPM 6-8 hours per day per leg at this point and was told to do this routine for 30 days. Patient reports current settings of -4 extension and 60 deg flexion. Patient reports insurance will not cover it for that long. Patient reports greatest functional difficulty with transfers, has no chairs in her home that are firm surface with armrests to push up so is having family assistance for transfers and needed therapist assistance from the waiting room chair to get up for eval. Patient reports she is sleeping in recliner, on her back with pillows under ankles only. Patient is having low back pain from abnormal walking pattern. Patient had difficulty with stairs as well. She is wearing B PRITI hose. Patient denies calf pain and is doing HEP from hospital. In August 2019 she had B knee scopes for torn meniscus and pain was persistent so replacement was opted for. SUBJECTIVE:  Patient reports that yesterday she bent over to pick something up and experienced a pop in her L lateral knee with increased soreness afterwards and remaining this morning. Patient is now using straight cane for ambulation both within the home and community. Patient rates overall improvement 50-60% since evaluation. Patient is now sleeping in bed but limited to her back due to pain with sidelying.  Patient reports getting in and out of car is getting easier and she plans to trial driving later today. Patient reports knee flexion ROM is greatest limitation and standing for prolonged time without UE support remains challenging. Patient was cleared to return to work 3/8/2021. All steri strips on L incision have fallen off and R knee incision remains with steri strips intact. TREATMENT   Precautions:    Pain: 6/10 L knee, 4/10 R knee    X in shaded column indicates activity completed today   Modalities Parameters/  Location  Notes         CP to B knee  10 minutes  X Applied post treatment         Manual Therapy Time/Technique  Notes                     Exercise/Intervention   Notes   Heelslides 10B      Heelslides with strap 10B 5\"     SAQ 5B 3\"     Side lying  Hip Abduction 10B      SLR 10B      VMO SLR 10B      Heel Prop    R knee lacking 3 deg from neutral; L knee lacking 2 deg from neutral   Hook-lying knee flexion stretch 3B 20\"  Therapist performing passively   Supine knee flexion wall slide  10 B 5 seconds X R =110 degrees L=109 degrees    Seated knee flexion stretch with opp leg assist 1B 10 sec     LAQ 10B      Seated HS curl Heelslide 15B 3\"     Foam with standing exercises:        Standing HR/TR 20B   BUE support   Standing march 20B   Alternating with BUE support   Standing 3-way hip 20B   BUE support   Standing HS curls 20B      Mini squats 20      TKE 15B 5 sec  Blue band issued   NuStep  5 min Level 2 X Seat 8, no arms   Step Ups (fwd/lat) 20B each way 6 inch X BUE support; cues to avoid hip hike and circumduction    Standing HS and knee flexion stretch (lunging forward with foot on 2nd step) 3B 20 sec X    Flexion stretch with box and airex behind patient and dorsum of foot resting on it.   3B 20 sec X On 2nd step this date due to availability of PB   Gait training  10'   With straight cane    Dynamic gait: side stepping, marches, retro, and HS curls in PB X 2 laps ew  X Sidestepping only today   NK Table x10 B ew  X 2.5# both directions          Standing gastroc stretch on transfer from all surfaces without need for UE support. [] Goal Met [x] Goal Not Met [x] Continue Goal [] Discontinue Goal  [] Revise Goal  Goal Assessment: Patient can now perform sit<>stand without physical assistance; however, relies heavily on UE support especially from low, soft surfaces. Long Term Goals:  Time Frame: 12 weeks     1. Patient will demonstrate increased LE strength to greater than or equal to 4+/5 to improve overall standing tolerance and allow patient to return to work.   [] Goal Met [x] Goal Not Met [x] Continue Goal [] Discontinue Goal  [] Revise Goal  Goal Assessment: B quads 4/5, B hamstrings 4/5, B hip flexors 4-/5    2. Patient will demonstrate improved function on LEFS with score of >65/80. [] Goal Met [x] Goal Not Met [x] Continue Goal [] Discontinue Goal  [] Revise Goal  Goal Assessment: 31/80 today compared to 5/80 at last PN. 3.  Patient will be independent with comprehensive HEP. [x] Goal Met [] Goal Not Met [x] Continue Goal [] Discontinue Goal  [] Revise Goal  Goal Assessment: Patient is doing HEP twice per day, focusing on knee flexion. 4.  Patient will ambulate without antalgia, unlimited community distances without need for AD demonstrating normalized knee mechanics. [] Goal Met [x] Goal Not Met [x] Continue Goal [] Discontinue Goal  [] Revise Goal  Goal Assessment: Walking tolerance limited to 5-7 minutes and patient has ongoing need for straight cane. Patient demonstrates ongoing deficits in knee flexion during swing phase while heel strike is improving at initial contact. Patient Education:   [x]  HEP/Education Completed: wall slides if able     [employer] Access Code: Issued handouts but did not provide electronic log in information. []  No new Education completed  [x]  Reviewed Prior HEP      [x]  Patient verbalized and/or demonstrated understanding of education provided.   []  Patient unable to verbalize and/or demonstrate understanding of

## 2021-01-27 ENCOUNTER — HOSPITAL ENCOUNTER (OUTPATIENT)
Dept: PHYSICAL THERAPY | Age: 56
Setting detail: THERAPIES SERIES
Discharge: HOME | End: 2021-01-27
Payer: COMMERCIAL

## 2021-01-27 PROCEDURE — 97110 THERAPEUTIC EXERCISES: CPT

## 2021-01-27 NOTE — PROGRESS NOTES
as possible. Patient is using CPM 6-8 hours per day per leg at this point and was told to do this routine for 30 days. Patient reports current settings of -4 extension and 60 deg flexion. Patient reports insurance will not cover it for that long. Patient reports greatest functional difficulty with transfers, has no chairs in her home that are firm surface with armrests to push up so is having family assistance for transfers and needed therapist assistance from the waiting room chair to get up for eval. Patient reports she is sleeping in recliner, on her back with pillows under ankles only. Patient is having low back pain from abnormal walking pattern. Patient had difficulty with stairs as well. She is wearing B PRITI hose. Patient denies calf pain and is doing HEP from hospital. In August 2019 she had B knee scopes for torn meniscus and pain was persistent so replacement was opted for. SUBJECTIVE:  Patient reports she has started driving without incident. Patient has stopped wearing PRITI hose per physician clearance and notes that she is surprised how much more sore she is as a result. Patient questioned if she should continue wearing them at some points during the day. Left lateral knee and distal ITBand pain is significantly improved from last visit.      TREATMENT   Precautions:    Pain: 5/10 L knee, 5/10 R knee    X in shaded column indicates activity completed today   Modalities Parameters/  Location  Notes         CP to B knee  10 minutes   Applied post treatment         Manual Therapy Time/Technique  Notes                     Exercise/Intervention   Notes   Heelslides with strap 10B 5\"     Heel Prop    R knee lacking 3 deg from neutral; L knee lacking 2 deg from neutral   Supine knee flexion wall slide  10 B 5 seconds  R =110 degrees L=109 degrees           Foam with standing exercises:        Standing HR/TR 20B  X BUE support   Standing march 20B  X Alternating with BUE support   Standing 3-way hip 20B  X Goals: Time Frame: 6 weeks    1. Patient will report reduction of B knee pain to less than 2/10 with ability to ascend/descend stairs and perform sit<>stand transfers. 2.  Patient will demonstrate increased B knee AROM to 0-120 degrees to improve car transfer and normalize gait pattern. 3. Patient will be able to perform indep sit<>stand transfer from all surfaces without need for UE support. Long Term Goals:  Time Frame: 12 weeks     1. Patient will demonstrate increased LE strength to greater than or equal to 4+/5 to improve overall standing tolerance and allow patient to return to work. 2.  Patient will demonstrate improved function on LEFS with score of >65/80. 3.  Patient will be independent with comprehensive HEP. 4.  Patient will ambulate without antalgia, unlimited community distances without need for AD demonstrating normalized knee mechanics. Patient Education:   [x]  HEP/Education Completed: Discussed use of PRITI hose for swelling reduction to prevent impact on knee ROM. [address] Access Code.   []  No new Education completed  [x]  Reviewed Prior HEP      [x]  Patient verbalized and/or demonstrated understanding of education provided. []  Patient unable to verbalize and/or demonstrate understanding of education provided. Will continue education. []  Barriers to learning:     PLAN:  Treatment Recommendations: Strengthening, Range of Motion, Balance Training, Functional Mobility Training, Transfer Training, Endurance Training, Gait Training, Stair Training, Neuromuscular Re-education, Manual Therapy - Soft Tissue Mobilization, Pain Management, Home Exercise Program, Patient Education, Safety Education and Training, Positioning, Aquatics and Modalities    []  Plan of care initiated. Plan to see patient 3 times per week for 12 weeks to address the treatment planned outlined above.   [x]  Continue with current plan of care with extension of certification date to 3/28/2021   [] Modify plan of care as follows:    []  Hold pending physician visit   []  Discharge    Time In 1515   Time Out 1600   Timed Code Minutes: 45 min   Total Treatment Time: 45 min       Electronically Signed by: Jeannette Del Cid

## 2021-02-03 ENCOUNTER — HOSPITAL ENCOUNTER (OUTPATIENT)
Dept: PHYSICAL THERAPY | Age: 56
Setting detail: THERAPIES SERIES
Discharge: HOME | End: 2021-02-03
Payer: COMMERCIAL

## 2021-02-03 PROCEDURE — 97110 THERAPEUTIC EXERCISES: CPT

## 2021-02-03 NOTE — PROGRESS NOTES
[facility]  PHYSICAL THERAPY  [] EVALUATION  [x] DAILY NOTE (LAND) [] DAILY NOTE (AQUATIC ) [] PROGRESS NOTE [] DISCHARGE NOTE     [x] OUTPATIENT REHABILITATION [facility]   [] [place] [address]    [] [facility]   [] [facility] Emery    Date: 2/3/2021  Patient Name:  Alverto Buenrostro  : 1965  MRN: [id number]  CSN: [id number]    Referring Practitioner Mateo Arenas MD   Diagnosis Unilateral primary osteoarthritis, right knee [M17.11]  Unilateral primary osteoarthritis, left knee [M17.12]  Presence of right artificial knee joint [Z96.651]  Presence of left artificial knee joint [Z96.652]    Treatment Diagnosis R knee pain, L knee pain, R knee stiffness, L knee stiffness, Muscle weakness, Difficulty walking, Edema, Unsteadiness   Date of Evaluation 20    Additional Pertinent History Hyperlipidemia, anxiety, obesity, lumbar surgery, hx of B knee scope 2019      Functional Outcome Measure Used LEFS   Functional Outcome Score 31 (2021);  (20);  (20)       Insurance: Primary: Payor: [employer] /  /  / ,   Secondary:    Authorization Information: [employer]; Aquatics, modalities and telehealth covered, NO IONTO   Visit # 15, 2/10 for progress note; 25 total visits approved   Visits Allowed: 2 visits 20 TO 21, Reauthorized for 11 more visits from  through 3/28/21; Reauthorized for 11 more from 21 to 21    Covered codes: [address], [facility] [place] [phone], [address], [address], [address]   Recertification Date:    Physician Follow-Up: 3/15/2021   Physician Orders:    History of Present Illness: Patient presents status post B TKA performed 2020 by Dr. Trina Stephens at [facility]. Patient reports having overnight stay until 2020 in the evening. Patient reports having difficulty with pain control since surgery and is using Norco, Tramadol, and a muscle relaxer with ibuprofen PRN.  Patient continues to use ice as often as possible. Patient is using CPM 6-8 hours per day per leg at this point and was told to do this routine for 30 days. Patient reports current settings of -4 extension and 60 deg flexion. Patient reports insurance will not cover it for that long. Patient reports greatest functional difficulty with transfers, has no chairs in her home that are firm surface with armrests to push up so is having family assistance for transfers and needed therapist assistance from the waiting room chair to get up for eval. Patient reports she is sleeping in recliner, on her back with pillows under ankles only. Patient is having low back pain from abnormal walking pattern. Patient had difficulty with stairs as well. She is wearing B PRITI hose. Patient denies calf pain and is doing HEP from hospital. In August 2019 she had B knee scopes for torn meniscus and pain was persistent so replacement was opted for. SUBJECTIVE:  Patient continues to note tightness as her primary complaint in both knees. Patient is using the cane only when leaving the house. Good compliance with home program. Patient requested to ice today as she will not be able to head directly home after today's session.      TREATMENT   Precautions:    Pain: 5/10 L knee, 5/10 R knee    X in shaded column indicates activity completed today   Modalities Parameters/  Location  Notes         CP to B knee  10 minutes  X Applied post treatment         Manual Therapy Time/Technique  Notes                     Exercise/Intervention   Notes   Heelslides with strap 10B 5\"     Heel Prop    R knee lacking 3 deg from neutral; L knee lacking 2 deg from neutral   Supine knee flexion wall slide  10 B 5 seconds  R =110 degrees L=109 degrees    Side lying hip abduction       Foam with standing exercises:        Standing HR/TR 20B   BUE support   Standing march 20B  X Alternating with BUE support   Standing 3-way hip 20B   BUE support   Standing HS curls 20B  X    Mini squats 20  X           TKE 15B 5 sec  Blue band issued   NuStep  5 min Level 3 X Seat 7, no arms   Step Ups (fwd/lat) 20B each way 6 inch X BUE support   Standing HS and knee flexion stretch (lunging forward with foot on 2nd step) 3B 20 sec X    Flexion stretch with box and airex behind patient and dorsum of foot resting on it. 3B 20 sec X On 2nd step this date due to availability of PB   Gait training  5'  No AD, cues for knee flexion during swing phase   Dynamic gait: side stepping, marches, retro, and HS curls in PB X 2 laps ew   Sidestepping only today   NK Table (Quad and Hamstring) 15 Bilateral  5 second hold LAQ X 2.5# both directions          Standing gastroc stretch on step 2x 20 B  X    Rockerboard taps and hover fwd/back, side/side 10 taps ew 30 second hover     Total Gym squats x10  X    Resisted Cable Column Walking x3 ew 20# X    Stair training 2x   Ascending and descending stairs twice with B railings- patient able to complete reciprocally with pain. Specific Interventions Next Treatment:  Progress standing LE strengthening. Updated flow sheet to remove supine and seated exercises that patient is performing at home now. Activity/Treatment Tolerance:  [x]  Patient tolerated treatment well  []  Patient limited by fatigue  []  Patient limited by pain   []  Patient limited by medical complications  []  Other:     Assessment: Patient is able to tolerate today's treatment session and exercise progressions well but does note global fatigue and B knee soreness at conclusion. Patient is making steady progress but continues to be most challenged with active knee flexion bilaterally. GOALS:  Patient Goal: Walk without AD, return to work. Short Term Goals:  Time Frame: 6 weeks    1. Patient will report reduction of B knee pain to less than 2/10 with ability to ascend/descend stairs and perform sit<>stand transfers.      2.  Patient will demonstrate increased B knee AROM to 0-120 degrees to improve car transfer and normalize gait pattern. 3. Patient will be able to perform indep sit<>stand transfer from all surfaces without need for UE support. Long Term Goals:  Time Frame: 12 weeks     1. Patient will demonstrate increased LE strength to greater than or equal to 4+/5 to improve overall standing tolerance and allow patient to return to work. 2.  Patient will demonstrate improved function on LEFS with score of >65/80. 3.  Patient will be independent with comprehensive HEP. 4.  Patient will ambulate without antalgia, unlimited community distances without need for AD demonstrating normalized knee mechanics. Patient Education:   []  HEP/Education Completed: Discussed use of PRITI hose for swelling reduction to prevent impact on knee ROM. [facility] Access Code. [x]  No new Education completed  [x]  Reviewed Prior HEP      [x]  Patient verbalized and/or demonstrated understanding of education provided. []  Patient unable to verbalize and/or demonstrate understanding of education provided. Will continue education. []  Barriers to learning:     PLAN:  Treatment Recommendations: Strengthening, Range of Motion, Balance Training, Functional Mobility Training, Transfer Training, Endurance Training, Gait Training, Stair Training, Neuromuscular Re-education, Manual Therapy - Soft Tissue Mobilization, Pain Management, Home Exercise Program, Patient Education, Safety Education and Training, Positioning, Aquatics and Modalities    []  Plan of care initiated. Plan to see patient 3 times per week for 12 weeks to address the treatment planned outlined above.   [x]  Continue with current plan of care with extension of certification date to 3/28/2021   []  Modify plan of care as follows:    []  Hold pending physician visit   []  Discharge    Time In 1005   Time Out 1100   Timed Code Minutes: 45 min   Total Treatment Time: 55 min       Electronically Signed by: Phain Gonzalez PTA

## 2021-02-05 ENCOUNTER — APPOINTMENT (OUTPATIENT)
Dept: PHYSICAL THERAPY | Age: 56
End: 2021-02-05
Payer: COMMERCIAL

## 2021-02-08 ENCOUNTER — HOSPITAL ENCOUNTER (OUTPATIENT)
Dept: PHYSICAL THERAPY | Age: 56
Setting detail: THERAPIES SERIES
Discharge: HOME | End: 2021-02-08
Payer: COMMERCIAL

## 2021-02-08 PROCEDURE — 97110 THERAPEUTIC EXERCISES: CPT

## 2021-02-08 NOTE — PROGRESS NOTES
possible. Patient is using CPM 6-8 hours per day per leg at this point and was told to do this routine for 30 days. Patient reports current settings of -4 extension and 60 deg flexion. Patient reports insurance will not cover it for that long. Patient reports greatest functional difficulty with transfers, has no chairs in her home that are firm surface with armrests to push up so is having family assistance for transfers and needed therapist assistance from the waiting room chair to get up for eval. Patient reports she is sleeping in recliner, on her back with pillows under ankles only. Patient is having low back pain from abnormal walking pattern. Patient had difficulty with stairs as well. She is wearing B PRITI hose. Patient denies calf pain and is doing HEP from hospital. In August 2019 she had B knee scopes for torn meniscus and pain was persistent so replacement was opted for. SUBJECTIVE:  Patient felt the challenge after last visit with her added progressions and is motivated to continue progressing. Patient would like to ice again after this visit due to not being able to head home right away. Patient notes increased soreness today and relate this to having a further parking spot.       TREATMENT   Precautions:    Pain: 5/10 L knee, 5/10 R knee    X in shaded column indicates activity completed today   Modalities Parameters/  Location  Notes         CP to B knee  10 minutes  X Applied post treatment         Manual Therapy Time/Technique  Notes                     Exercise/Intervention   Notes   Heelslides with strap 10B 5\"     Heel Prop    R knee lacking 3 deg from neutral; L knee lacking 2 deg from neutral   Supine knee flexion wall slide  10 B 5 seconds X R =112 degrees L=116 degrees    Side lying hip abduction       Foam with standing exercises:        Standing HR/TR 20B   BUE support   Standing march 20B   Alternating with BUE support   Standing 3-way hip 20B   BUE support   Standing HS curls 20B Mini squats 20             TKE 15B 5 sec  Blue band issued   NuStep  5 min Level 3 X Seat 7, no arms   Step Ups (fwd/lat) 20B each way 6 inch X BUE support   Standing HS and knee flexion stretch (lunging forward with foot on 2nd step) 3B 20 sec X    Flexion stretch with box and airex behind patient and dorsum of foot resting on it. 3B 20 sec X On 3rd step this date due to availability of PB   Gait training  5'  No AD, cues for knee flexion during swing phase   Dynamic gait: side stepping, marches, retro, and HS curls in PB X 2 laps ew   Sidestepping only today   NK Table (Quad and Hamstring) 2x10 Bilateral  5 second hold LAQ X 2.5# both directions          Standing gastroc stretch on step 2x 20 B  X    Rockerboard taps and hover fwd/back, side/side 10 taps ew 30 second hover     Total Gym squats 2x10  X    Resisted Cable Column Walking x3 ew 20# X    Stair training 2x   Ascending and descending stairs twice with B railings- patient able to complete reciprocally with pain. Specific Interventions Next Treatment:  Progress standing LE strengthening. Updated flow sheet to remove supine and seated exercises that patient is performing at home now. Activity/Treatment Tolerance:  [x]  Patient tolerated treatment well  []  Patient limited by fatigue  []  Patient limited by pain   []  Patient limited by medical complications  []  Other:     Assessment: Patient tolerates today's treatment session and exercises well without exacerbating pain levels but is fatigued bilaterally and notes a challenge with CKC strengthening especially including steps and resisted walking due to weakness with some distal IT band pain. Patient was instructed on supine IT band stretch stretching for HEP to address lateral joint pain. GOALS:  Patient Goal: Walk without AD, return to work. Short Term Goals:  Time Frame: 6 weeks    1.  Patient will report reduction of B knee pain to less than 2/10 with ability to ascend/descend stairs Signed by: Ra Hill PTA

## 2021-02-10 ENCOUNTER — HOSPITAL ENCOUNTER (OUTPATIENT)
Dept: PHYSICAL THERAPY | Age: 56
Setting detail: THERAPIES SERIES
End: 2021-02-10
Payer: COMMERCIAL

## 2021-02-12 ENCOUNTER — HOSPITAL ENCOUNTER (OUTPATIENT)
Dept: PHYSICAL THERAPY | Age: 56
Setting detail: THERAPIES SERIES
End: 2021-02-12
Payer: COMMERCIAL

## 2021-02-15 ENCOUNTER — HOSPITAL ENCOUNTER (OUTPATIENT)
Dept: PHYSICAL THERAPY | Age: 56
Setting detail: THERAPIES SERIES
Discharge: HOME | End: 2021-02-15
Payer: COMMERCIAL

## 2021-02-15 PROCEDURE — 97110 THERAPEUTIC EXERCISES: CPT

## 2021-02-15 PROCEDURE — 6370000000 HC RX 637 (ALT 250 FOR IP)

## 2021-02-15 NOTE — PROGRESS NOTES
[address] [facility]  PHYSICAL THERAPY  [] EVALUATION  [x] DAILY NOTE (LAND) [] DAILY NOTE (AQUATIC ) [] PROGRESS NOTE [] DISCHARGE NOTE     [x] OUTPATIENT REHABILITATION [facility]   [] [place] [address]    [] [facility]   [] Mike Garcia    Date: 2/15/2021  Patient Name:  Matt Hector  : 1965  MRN: [id number]  CSN: [id number]    Referring Practitioner Loan Hoover MD   Diagnosis Unilateral primary osteoarthritis, right knee [M17.11]  Unilateral primary osteoarthritis, left knee [M17.12]  Presence of right artificial knee joint [Z96.651]  Presence of left artificial knee joint [Z96.652]    Treatment Diagnosis R knee pain, L knee pain, R knee stiffness, L knee stiffness, Muscle weakness, Difficulty walking, Edema, Unsteadiness   Date of Evaluation 20    Additional Pertinent History Hyperlipidemia, anxiety, obesity, lumbar surgery, hx of B knee scope 2019      Functional Outcome Measure Used LEFS   Functional Outcome Score 31/80 (2021); 580 (20); 80 (20)       Insurance: Primary: Payor: Maciel Mckeon /  /  / ,   Secondary:    Authorization Information: [employer]; Aquatics, modalities and telehealth covered, NO IONTO   Visit # 17, 4/10 for progress note; 25 total visits approved   Visits Allowed: 2 visits 20 TO 21, Reauthorized for 11 more visits from  through 3/28/21; Reauthorized for 11 more from 21 to 21    Covered codes: [address], [address] [facility], [phone], [address] [place], [address], [address]   Recertification Date:    Physician Follow-Up: 3/15/2021   Physician Orders:    History of Present Illness: Patient presents status post B TKA performed 2020 by Dr. Aubree Valentin at [facility]. Patient reports having overnight stay until 2020 in the evening. Patient reports having difficulty with pain control since surgery and is using Norco, Tramadol, and a muscle relaxer with ibuprofen PRN.  Patient continues to use ice as often as possible. Patient is using CPM 6-8 hours per day per leg at this point and was told to do this routine for 30 days. Patient reports current settings of -4 extension and 60 deg flexion. Patient reports insurance will not cover it for that long. Patient reports greatest functional difficulty with transfers, has no chairs in her home that are firm surface with armrests to push up so is having family assistance for transfers and needed therapist assistance from the waiting room chair to get up for eval. Patient reports she is sleeping in recliner, on her back with pillows under ankles only. Patient is having low back pain from abnormal walking pattern. Patient had difficulty with stairs as well. She is wearing B PRITI hose. Patient denies calf pain and is doing HEP from hospital. In August 2019 she had B knee scopes for torn meniscus and pain was persistent so replacement was opted for. SUBJECTIVE:  Patient notes primary complaint is distal R knee pain and stiffness. Patient continues to use the riser for toilet height. Patient would like to ice at conclusion.      TREATMENT   Precautions:    Pain: 5/10 L knee, 5/10 R knee    X in shaded column indicates activity completed today   Modalities Parameters/  Location  Notes         CP to B knee  10 minutes  X Applied post treatment         Manual Therapy Time/Technique  Notes                     Exercise/Intervention   Notes   Heelslides with strap 10B 5\"     Heel Prop    R knee lacking 3 deg from neutral; L knee lacking 2 deg from neutral   Supine knee flexion wall slide  10 B 5 seconds  R =112 degrees L=116 degrees    Side lying hip abduction       Foam with standing exercises:        Standing HR/TR 20B   BUE support   Standing march 20B   Alternating with BUE support   Standing 3-way hip 20B   BUE support   Standing HS curls 20B      Mini squats 20             TKE 15B 5 sec  Blue band issued   NuStep  5 min Level 3 X Seat 7, no arms   Step Ups (fwd/lat) 20B each way 6 inch X BUE support   Standing HS and knee flexion stretch (lunging forward with foot on 2nd step) 3B 20 sec X    Flexion stretch with box and airex behind patient and dorsum of foot resting on it. 3B 20 sec X On 3rd step this date due to availability of PB   Gait training  5'  No AD, cues for knee flexion during swing phase   Dynamic gait: side stepping, marches, retro, and HS curls in PB X 2 laps ew   Sidestepping only today   NK Table (Quad and Hamstring) 2x8 Bilateral  5 second hold LAQ X 5# both directions          Standing gastroc stretch on step 2x 20 B  X    Rockerboard taps and hover fwd/back, side/side 10 taps ew 30 second hover     Total Gym squats 2x15  X    Resisted Cable Column Walking x3 ew 20# X    Stair training 2x   Ascending and descending stairs twice with B railings- patient able to complete reciprocally with pain. Specific Interventions Next Treatment:  Progress standing LE strengthening. Updated flow sheet to remove supine and seated exercises that patient is performing at home now. Activity/Treatment Tolerance:  [x]  Patient tolerated treatment well  []  Patient limited by fatigue  []  Patient limited by pain   []  Patient limited by medical complications  []  Other:     Assessment: Patient demonstrates improved gait quality and stability through L LE especially with some decrease in stance time and heel strike through R LE. Patient is challenged with current strengthening program.       GOALS:  Patient Goal: Walk without AD, return to work. Short Term Goals:  Time Frame: 6 weeks    1. Patient will report reduction of B knee pain to less than 2/10 with ability to ascend/descend stairs and perform sit<>stand transfers. 2.  Patient will demonstrate increased B knee AROM to 0-120 degrees to improve car transfer and normalize gait pattern. 3. Patient will be able to perform indep sit<>stand transfer from all surfaces without need for UE support.        Long Term Goals: Time Frame: 12 weeks     1. Patient will demonstrate increased LE strength to greater than or equal to 4+/5 to improve overall standing tolerance and allow patient to return to work. 2.  Patient will demonstrate improved function on LEFS with score of >65/80. 3.  Patient will be independent with comprehensive HEP. 4.  Patient will ambulate without antalgia, unlimited community distances without need for AD demonstrating normalized knee mechanics. Patient Education:   []  HEP/Education Completed: IT band stretch  Medbridge Access Code.   []  No new Education completed  [x]  Reviewed Prior HEP      [x]  Patient verbalized and/or demonstrated understanding of education provided. []  Patient unable to verbalize and/or demonstrate understanding of education provided. Will continue education. []  Barriers to learning:     PLAN:  Treatment Recommendations: Strengthening, Range of Motion, Balance Training, Functional Mobility Training, Transfer Training, Endurance Training, Gait Training, Stair Training, Neuromuscular Re-education, Manual Therapy - Soft Tissue Mobilization, Pain Management, Home Exercise Program, Patient Education, Safety Education and Training, Positioning, Aquatics and Modalities    []  Plan of care initiated. Plan to see patient 3 times per week for 12 weeks to address the treatment planned outlined above.   [x]  Continue with current plan of care with extension of certification date to 3/28/2021   []  Modify plan of care as follows:    []  Hold pending physician visit   []  Discharge    Time In 1140   Time Out 1233   Timed Code Minutes: 43 min   Total Treatment Time: 53 min       Electronically Signed by: Sunita Rivers PTA

## 2021-02-17 ENCOUNTER — HOSPITAL ENCOUNTER (OUTPATIENT)
Dept: PHYSICAL THERAPY | Age: 56
Setting detail: THERAPIES SERIES
Discharge: HOME | End: 2021-02-17
Payer: COMMERCIAL

## 2021-02-17 PROCEDURE — 97110 THERAPEUTIC EXERCISES: CPT

## 2021-02-17 NOTE — PROGRESS NOTES
[address] [facility]  PHYSICAL THERAPY  [] EVALUATION  [x] DAILY NOTE (LAND) [] DAILY NOTE (AQUATIC ) [] PROGRESS NOTE [] DISCHARGE NOTE     [x] OUTPATIENT REHABILITATION [facility]   [] [place] [address]    [] [facility]   [] Doc Has    Date: 2021  Patient Name:  Deisy Stokes  : 1965  MRN: [id number]  CSN: [id number]    Referring Practitioner Catarina Santos MD   Diagnosis Unilateral primary osteoarthritis, right knee [M17.11]  Unilateral primary osteoarthritis, left knee [M17.12]  Presence of right artificial knee joint [Z96.651]  Presence of left artificial knee joint [Z96.652]    Treatment Diagnosis R knee pain, L knee pain, R knee stiffness, L knee stiffness, Muscle weakness, Difficulty walking, Edema, Unsteadiness   Date of Evaluation 20    Additional Pertinent History Hyperlipidemia, anxiety, obesity, lumbar surgery, hx of B knee scope 2019      Functional Outcome Measure Used LEFS   Functional Outcome Score  (2021);  (20);  (20)       Insurance: Primary: Payor: George Casas 150 /  /  / ,   Secondary:    Authorization Information: [employer]; Aquatics, modalities and telehealth covered, NO IONTO   Visit # 18, 5/10 for progress note; 25 total visits approved   Visits Allowed: 2 visits 20 TO 21, Reauthorized for 11 more visits from  through 3/28/21; Reauthorized for 11 more from 21 to 21    Covered codes: [address], [address] [facility], [phone], [address], [address], [address]   Recertification Date: 9166   Physician Follow-Up: 3/15/2021   Physician Orders:    History of Present Illness: Patient presents status post B TKA performed 2020 by Dr. Michael Ledesma at [facility]. Patient reports having overnight stay until 2020 in the evening. Patient reports having difficulty with pain control since surgery and is using Norco, Tramadol, and a muscle relaxer with ibuprofen PRN.  Patient continues to use ice as often as possible. Patient is using CPM 6-8 hours per day per leg at this point and was told to do this routine for 30 days. Patient reports current settings of -4 extension and 60 deg flexion. Patient reports insurance will not cover it for that long. Patient reports greatest functional difficulty with transfers, has no chairs in her home that are firm surface with armrests to push up so is having family assistance for transfers and needed therapist assistance from the waiting room chair to get up for eval. Patient reports she is sleeping in recliner, on her back with pillows under ankles only. Patient is having low back pain from abnormal walking pattern. Patient had difficulty with stairs as well. She is wearing B PRITI hose. Patient denies calf pain and is doing HEP from hospital. In August 2019 she had B knee scopes for torn meniscus and pain was persistent so replacement was opted for. SUBJECTIVE:  Patient reports that her R knee is more stiff and painful today along infrapatellar region and along medial compartment, rated 6/10. Patient voiced that L knee pain is 2/10. Patient is managing pain with Norco inconsistently, ibuprofen 1-2x/day as needed. Patient continues to use ice for pain and inflammation relief. Patient will be returning to work 3/8/21 with 8 hour days. Patient does have some left knee infrapatellar and lateral knee crepitus although this is not painful.        TREATMENT   Precautions:    Pain: 2/10 L knee, 6/10 R knee    X in shaded column indicates activity completed today   Modalities Parameters/  Location  Notes         CP to B knee  10 minutes  X Applied post treatment         Manual Therapy Time/Technique  Notes                     Exercise/Intervention   Notes   Heelslides with strap 10B 5\"     Heel Prop    R knee lacking 3 deg from neutral; L knee lacking 2 deg from neutral   Supine knee flexion wall slide  10 B 5 seconds  R =115 degrees L=119 degrees    Side lying hip abduction Foam with standing exercises:        Standing HR/TR 20B   BUE support   Standing march 20B   Alternating with BUE support   Standing 3-way hip 20B   BUE support   Standing HS curls 20B      Mini squats 20             TKE 15B 5 sec  Blue band issued   NuStep  5 min Level 3 X Seat 7, no arms   Step Ups (fwd/lat) 20B each way 6 inch X BUE support   Standing HS and knee flexion stretch (lunging forward with foot on 2nd step) 3B 20 sec X    Flexion stretch with box and airex behind patient and dorsum of foot resting on it. 3B 20 sec X    Gait training  5'  No AD, cues for knee flexion during swing phase   Dynamic gait: side stepping, marches, retro, and HS curls in PB X 2 laps ew  X Sidestepping only today   NK Table (Quad and Hamstring) 2x10 Bilateral  5 second hold LAQ X 5# both directions          Standing gastroc stretch on step 2x 20 B  X    Rockerboard taps and hover fwd/back, side/side 10 taps ew 30 second hover X    Total Gym squats 2x15  X    Resisted Cable Column Walking x3 ew 20# X Forward and backwards   Stair training 2x   Ascending and descending stairs twice with B railings- patient able to complete reciprocally with pain. Specific Interventions Next Treatment:  Progress standing LE strengthening. Updated flow sheet to remove supine and seated exercises that patient is performing at home now. Activity/Treatment Tolerance:  [x]  Patient tolerated treatment well  []  Patient limited by fatigue  []  Patient limited by pain   []  Patient limited by medical complications  []  Other:     Assessment: Patient did well with program today but did have to complete sidestepping without resistance due to R knee pain today. Patient did achieve increased B knee flexion ROM by 3 degrees bilaterally today. Aside from R knee pain which was moderate today, patient is making steady progress at this time. GOALS:  Patient Goal: Walk without AD, return to work. Short Term Goals:  Time Frame: 6 weeks    1. Patient will report reduction of B knee pain to less than 2/10 with ability to ascend/descend stairs and perform sit<>stand transfers. 2.  Patient will demonstrate increased B knee AROM to 0-120 degrees to improve car transfer and normalize gait pattern. 3. Patient will be able to perform indep sit<>stand transfer from all surfaces without need for UE support. Long Term Goals:  Time Frame: 12 weeks     1. Patient will demonstrate increased LE strength to greater than or equal to 4+/5 to improve overall standing tolerance and allow patient to return to work. 2.  Patient will demonstrate improved function on LEFS with score of >65/80. 3.  Patient will be independent with comprehensive HEP. 4.  Patient will ambulate without antalgia, unlimited community distances without need for AD demonstrating normalized knee mechanics. Patient Education:   []  HEP/Education Completed: IT band stretch  Medbridge Access Code.   []  No new Education completed  [x]  Reviewed Prior HEP      [x]  Patient verbalized and/or demonstrated understanding of education provided. []  Patient unable to verbalize and/or demonstrate understanding of education provided. Will continue education. []  Barriers to learning:     PLAN:  Treatment Recommendations: Strengthening, Range of Motion, Balance Training, Functional Mobility Training, Transfer Training, Endurance Training, Gait Training, Stair Training, Neuromuscular Re-education, Manual Therapy - Soft Tissue Mobilization, Pain Management, Home Exercise Program, Patient Education, Safety Education and Training, Positioning, Aquatics and Modalities    []  Plan of care initiated. Plan to see patient 3 times per week for 12 weeks to address the treatment planned outlined above.   [x]  Continue with current plan of care with extension of certification date to 3/28/2021   []  Modify plan of care as follows:    []  Hold pending physician visit   []  Discharge    Time In 1347   Time Out 1440   Timed Code Minutes: 43 min   Total Treatment Time: 53 min       Electronically Signed by: Jeannette Del Cid

## 2021-02-19 ENCOUNTER — HOSPITAL ENCOUNTER (OUTPATIENT)
Dept: PHYSICAL THERAPY | Age: 56
Setting detail: THERAPIES SERIES
Discharge: HOME | End: 2021-02-19
Payer: COMMERCIAL

## 2021-02-19 PROCEDURE — 97110 THERAPEUTIC EXERCISES: CPT

## 2021-02-19 NOTE — PROGRESS NOTES
[address] [facility]  PHYSICAL THERAPY  [] EVALUATION  [x] DAILY NOTE (LAND) [] DAILY NOTE (AQUATIC ) [] PROGRESS NOTE [] DISCHARGE NOTE     [x] OUTPATIENT REHABILITATION [facility]   [] [place] [address]    [] [facility]   [] Guzman Picking    Date: 2021  Patient Name:  Ned Frausto  : 1965  MRN: [id number]  CSN: [id number]    Referring Practitioner Vic Heck MD   Diagnosis Unilateral primary osteoarthritis, right knee [M17.11]  Unilateral primary osteoarthritis, left knee [M17.12]  Presence of right artificial knee joint [Z96.651]  Presence of left artificial knee joint [Z96.652]    Treatment Diagnosis R knee pain, L knee pain, R knee stiffness, L knee stiffness, Muscle weakness, Difficulty walking, Edema, Unsteadiness   Date of Evaluation 20    Additional Pertinent History Hyperlipidemia, anxiety, obesity, lumbar surgery, hx of B knee scope 2019      Functional Outcome Measure Used LEFS   Functional Outcome Score 31/80 (2021); 580 (20); 80 (20)       Insurance: Primary: Payor: Xochilt Lopez /  /  / ,   Secondary:    Authorization Information: [employer]; Aquatics, modalities and telehealth covered, NO IONTO   Visit # 19, 6/10 for progress note; 25 total visits approved   Visits Allowed: 2 visits 20 TO 21, Reauthorized for 11 more visits from  through 3/28/21; Reauthorized for 11 more from 21 to 21    Covered codes: [address], [address] [facility], [phone], [address], [address], [address]   Recertification Date: 983   Physician Follow-Up: 3/15/2021   Physician Orders:    History of Present Illness: Patient presents status post B TKA performed 2020 by Dr. Cachorro Guzman at [facility]. Patient reports having overnight stay until 2020 in the evening. Patient reports having difficulty with pain control since surgery and is using Norco, Tramadol, and a muscle relaxer with ibuprofen PRN.  Patient continues to use ice as often 20             TKE 15B 5 sec  Blue band issued   NuStep  5 min Level 3 X Seat 7, no arms   Step Ups (forward/lateral) 20x Bilateral each way 6 inch X BUE support   Standing HS and knee flexion stretch (lunging forward with foot on 2nd step) 3x Bilateral  20 seconds X    Flexion stretch with step behind patient and dorsum of foot resting on it. 3x Bilateral  20 seconds X    Gait training  5'  No AD, cues for knee flexion during swing phase   Dynamic gait: side stepping, marches, retro, and HS curls in PB x2 laps ew  X Sidestepping only today   NK Table (Quad and Hamstring) 2x10 Bilateral  5 second hold LAQ X 5# both directions          Standing gastroc stretch on step 2x Bilateral  20 seconds X    Rockerboard taps and hover fwd/back, side/side 10 taps ew 30 second hover X    Total Gym squats Level J 2x15  X    Resisted Cable Column Walking 3 laps each 20# X Forward and backwards   Stair training 2x   Ascending and descending stairs twice with B railings- patient able to complete reciprocally with pain. Specific Interventions Next Treatment:  Progress standing LE strengthening. Updated flow sheet to remove supine and seated exercises that patient is performing at home now. Activity/Treatment Tolerance:  [x]  Patient tolerated treatment well  []  Patient limited by fatigue  []  Patient limited by pain   []  Patient limited by medical complications  []  Other:     Assessment:  Patient tolerated activities well today and without increased pain. Patient with complaints of mild pain at Bilateral lateral knees and IT bands throughout session. Discussed use of heat and massage to lateral leg and encouraged patient to continue with IT band stretch. GOALS:  Patient Goal: Walk without AD, return to work. Short Term Goals:  Time Frame: 6 weeks    1. Patient will report reduction of B knee pain to less than 2/10 with ability to ascend/descend stairs and perform sit<>stand transfers.      2.  Patient will demonstrate increased B knee AROM to 0-120 degrees to improve car transfer and normalize gait pattern. 3. Patient will be able to perform indep sit<>stand transfer from all surfaces without need for UE support. Long Term Goals:  Time Frame: 12 weeks     1. Patient will demonstrate increased LE strength to greater than or equal to 4+/5 to improve overall standing tolerance and allow patient to return to work. 2.  Patient will demonstrate improved function on LEFS with score of >65/80. 3.  Patient will be independent with comprehensive HEP. 4.  Patient will ambulate without antalgia, unlimited community distances without need for AD demonstrating normalized knee mechanics. Patient Education:   []  HEP/Education Completed: IT band stretch   Medbridge Access Code.   []  No new Education completed  [x]  Reviewed Prior HEP      [x]  Patient verbalized and/or demonstrated understanding of education provided. []  Patient unable to verbalize and/or demonstrate understanding of education provided. Will continue education. []  Barriers to learning:     PLAN:  Treatment Recommendations: Strengthening, Range of Motion, Balance Training, Functional Mobility Training, Transfer Training, Endurance Training, Gait Training, Stair Training, Neuromuscular Re-education, Manual Therapy - Soft Tissue Mobilization, Pain Management, Home Exercise Program, Patient Education, Safety Education and Training, Positioning, Aquatics and Modalities    []  Plan of care initiated. Plan to see patient 3 times per week for 12 weeks to address the treatment planned outlined above.   [x]  Continue with current plan of care with extension of certification date to 3/28/2021   []  Modify plan of care as follows:    []  Hold pending physician visit   []  Discharge    Time In 1305   Time Out 1345   Timed Code Minutes: 40 min   Total Treatment Time: 40 min       Electronically Signed by: Yudith Day

## 2021-02-22 ENCOUNTER — HOSPITAL ENCOUNTER (OUTPATIENT)
Dept: PHYSICAL THERAPY | Age: 56
Setting detail: THERAPIES SERIES
Discharge: HOME | End: 2021-02-22
Payer: COMMERCIAL

## 2021-02-22 PROCEDURE — 97110 THERAPEUTIC EXERCISES: CPT

## 2021-02-22 NOTE — PROGRESS NOTES
as possible. Patient is using CPM 6-8 hours per day per leg at this point and was told to do this routine for 30 days. Patient reports current settings of -4 extension and 60 deg flexion. Patient reports insurance will not cover it for that long. Patient reports greatest functional difficulty with transfers, has no chairs in her home that are firm surface with armrests to push up so is having family assistance for transfers and needed therapist assistance from the waiting room chair to get up for eval. Patient reports she is sleeping in recliner, on her back with pillows under ankles only. Patient is having low back pain from abnormal walking pattern. Patient had difficulty with stairs as well. She is wearing B PRITI hose. Patient denies calf pain and is doing HEP from hospital. In August 2019 she had B knee scopes for torn meniscus and pain was persistent so replacement was opted for. SUBJECTIVE:  Patient reports her R knee continues to have moderate pain, rated 5/10 which she took [facility] [facility] for prior to visit today. Patient notes that L knee \"feels like I didn't even have surgery. \" Patient notes that she has been trying to alter her sleeping position with pillows to see if that helps the R knee pain. Patient had an episode of L knee somewhat giving out when attempting to climb the steps into her daughters home over the weekend but did not fall down, was able to use banister to catch herself. Aside from that incident, steps had been going better. Patient requests to have ice in clinic before leaving today due to running errands immediately following.        TREATMENT   Precautions:    Pain: 3/10 L knee, 5/10 R knee    X in shaded column indicates activity completed today   Modalities Parameters/  Location  Notes         CP to B knee  10 minutes   Applied post treatment         Manual Therapy Time/Technique  Notes                     Exercise/Intervention   Notes   Heelslides with strap 10B 5\"     Heel Prop    R knee lacking 3 deg from neutral; L knee lacking 2 deg from neutral   Supine knee flexion wall slide  10 B 5 seconds  R =115 degrees L=119 degrees    Side lying hip abduction       Foam with standing exercises:        Standing HR/TR 20B   BUE support   Standing march 20B   Alternating with BUE support   Standing 3-way hip 20B   BUE support   Standing HS curls 20B      Mini squats 20             TKE 15B 5 sec  Blue band issued   NuStep  5 min Level 4 X Seat 7, no arms   Step Ups (forward/lateral) 20x Bilateral each way 6 inch X BUE support   Standing HS and knee flexion stretch (lunging forward with foot on 2nd step) 3x Bilateral  20 seconds X    Flexion stretch with step behind patient and dorsum of foot resting on it. 3x Bilateral  20 seconds X    Gait training  5'  No AD, cues for knee flexion during swing phase   Dynamic gait: side stepping, marches, retro, and HS curls in PB x2 laps ew  X Sidestepping only today   NK Table (Quad and Hamstring) 2x10 Bilateral  5 second hold LAQ X 5# RLE/ 7.5# LLE          Standing gastroc stretch on step 2x Bilateral  20 seconds X    Rockerboard taps and hover fwd/back, side/side 10 taps ew 30 second hover X    Total Gym squats Level J 2x15  X    Resisted Cable Column Walking 3 laps each 25# X Forward and backwards; sidestepping without resistance   Stair training 2x   Ascending and descending stairs twice with B railings- patient able to complete reciprocally with pain. Specific Interventions Next Treatment:  Progress standing LE strengthening. Updated flow sheet to remove supine and seated exercises that patient is performing at home now.      Activity/Treatment Tolerance:  [x]  Patient tolerated treatment well  []  Patient limited by fatigue  []  Patient limited by pain   []  Patient limited by medical complications  []  Other:     Assessment:  Patient is making steady progress at this time and was able to tolerate increased weight on NK Table for both directions on LLE today. Also increased resistance for cable column walking today. Patient denied increase in pain with these activities today. Patient demonstrates ongoing antalgia with ambulation and decreased stance time on RLE today due to pain. Walking within clinic without AD today. GOALS:  Patient Goal: Walk without AD, return to work. Short Term Goals:  Time Frame: 6 weeks    1. Patient will report reduction of B knee pain to less than 2/10 with ability to ascend/descend stairs and perform sit<>stand transfers. 2.  Patient will demonstrate increased B knee AROM to 0-120 degrees to improve car transfer and normalize gait pattern. 3. Patient will be able to perform indep sit<>stand transfer from all surfaces without need for UE support. Long Term Goals:  Time Frame: 12 weeks     1. Patient will demonstrate increased LE strength to greater than or equal to 4+/5 to improve overall standing tolerance and allow patient to return to work. 2.  Patient will demonstrate improved function on LEFS with score of >65/80. 3.  Patient will be independent with comprehensive HEP. 4.  Patient will ambulate without antalgia, unlimited community distances without need for AD demonstrating normalized knee mechanics. Patient Education:   []  HEP/Education Completed: IT band stretch   Medbridge Access Code.   []  No new Education completed  [x]  Reviewed Prior HEP      [x]  Patient verbalized and/or demonstrated understanding of education provided. []  Patient unable to verbalize and/or demonstrate understanding of education provided. Will continue education.   []  Barriers to learning:     PLAN:  Treatment Recommendations: Strengthening, Range of Motion, Balance Training, Functional Mobility Training, Transfer Training, Endurance Training, Gait Training, Stair Training, Neuromuscular Re-education, Manual Therapy - Soft Tissue Mobilization, Pain Management, Home Exercise Program, Patient Education, Safety

## 2021-02-24 ENCOUNTER — HOSPITAL ENCOUNTER (OUTPATIENT)
Dept: PHYSICAL THERAPY | Age: 56
Setting detail: THERAPIES SERIES
End: 2021-02-24
Payer: COMMERCIAL

## 2021-02-25 ENCOUNTER — APPOINTMENT (OUTPATIENT)
Dept: PHYSICAL THERAPY | Age: 56
End: 2021-02-25
Payer: COMMERCIAL

## 2021-03-01 ENCOUNTER — HOSPITAL ENCOUNTER (OUTPATIENT)
Dept: PHYSICAL THERAPY | Age: 56
Setting detail: THERAPIES SERIES
Discharge: HOME | End: 2021-03-01
Payer: COMMERCIAL

## 2021-03-01 PROCEDURE — 97110 THERAPEUTIC EXERCISES: CPT

## 2021-03-01 NOTE — PROGRESS NOTES
[address] [facility]  PHYSICAL THERAPY  [] EVALUATION  [x] DAILY NOTE (LAND) [] DAILY NOTE (AQUATIC ) [] PROGRESS NOTE [] DISCHARGE NOTE     [x] OUTPATIENT REHABILITATION [facility]   [] [place] [address]    [] [facility]   [] Gabe Dawson    Date: 3/1/2021  Patient Name:  Tamy Ashraf  : 1965  MRN: [id number]  CSN: [id number]    Referring Practitioner Angel Toribio MD   Diagnosis Unilateral primary osteoarthritis, right knee [M17.11]  Unilateral primary osteoarthritis, left knee [M17.12]  Presence of right artificial knee joint [Z96.651]  Presence of left artificial knee joint [Z96.652]    Treatment Diagnosis R knee pain, L knee pain, R knee stiffness, L knee stiffness, Muscle weakness, Difficulty walking, Edema, Unsteadiness   Date of Evaluation 20    Additional Pertinent History Hyperlipidemia, anxiety, obesity, lumbar surgery, hx of B knee scope 2019      Functional Outcome Measure Used LEFS   Functional Outcome Score 31/80 (2021); 80 (20); 80 (20)       Insurance: Primary: Payor: [employer] /  /  / ,   Secondary:    Authorization Information: [employer]; Aquatics, modalities and telehealth covered, NO IONTO   Visit # 21, 8/10 for progress note; 25 total visits approved   Visits Allowed: 2 visits 20 TO 21, Reauthorized for 11 more visits from  through 3/28/21; Reauthorized for 11 more from 21 to 21    Covered codes: 55540, [id number], [address] [facility], [id number], [id number], [id number]   Recertification Date:    Physician Follow-Up: 3/15/2021   Physician Orders:    History of Present Illness: Patient presents status post B TKA performed 2020 by Dr. Domingo Aponte at [facility]. Patient reports having overnight stay until 2020 in the evening. Patient reports having difficulty with pain control since surgery and is using Norco, Tramadol, and a muscle relaxer with ibuprofen PRN.  Patient continues to use ice as often as possible. Patient is using CPM 6-8 hours per day per leg at this point and was told to do this routine for 30 days. Patient reports current settings of -4 extension and 60 deg flexion. Patient reports insurance will not cover it for that long. Patient reports greatest functional difficulty with transfers, has no chairs in her home that are firm surface with armrests to push up so is having family assistance for transfers and needed therapist assistance from the waiting room chair to get up for eval. Patient reports she is sleeping in recliner, on her back with pillows under ankles only. Patient is having low back pain from abnormal walking pattern. Patient had difficulty with stairs as well. She is wearing B PRITI hose. Patient denies calf pain and is doing HEP from hospital. In August 2019 she had B knee scopes for torn meniscus and pain was persistent so replacement was opted for. SUBJECTIVE:  Patient presents today reporting that she had to cancel last week due to sinus infection and bronchitis. She is now taking doxycycline antibiotic for it. Patient is feeling much better. Patient remained compliant with HEP while away from PT. Patient requests to reschedule those two visits. Patient is supposed to return to work 3/8/21 and voices some anxiety regarding returning due to being off for so long. Patient reports R knee continues to feel very sore and stiff and she is unsure why. Overall improvement of 80% at this time.        TREATMENT   Precautions:    Pain: 1/10 L knee, 5/10 R knee    X in shaded column indicates activity completed today   Modalities Parameters/  Location  Notes         CP to B knee  10 minutes  X Applied post treatment         Manual Therapy Time/Technique  Notes                     Exercise/Intervention   Notes   Heelslides with strap 10B 5\"     Heel Prop    R knee lacking 3 deg from neutral; L knee lacking 2 deg from neutral   Supine knee flexion wall slide  10 B 5 seconds  R =115 degrees L=119 degrees    Side lying hip abduction       Foam with standing exercises:        Standing HR/TR 20B   BUE support   Standing march 20B   Alternating with BUE support   Standing 3-way hip 20B   BUE support   Standing HS curls 20B  X Foam   Mini squats 20  X Foam          TKE 15B 5 sec  Blue band issued   Matrix Bike Seat 8 5 min Level 2 X    Step Ups (forward/lateral) 20x Bilateral each way 6 inch X BUE support   Standing HS and knee flexion stretch (lunging forward with foot on 2nd step) 3x Bilateral  20 seconds X    Flexion stretch with step behind patient and dorsum of foot resting on it. 3x Bilateral  20 seconds X    Gait training  5'  No AD, cues for knee flexion during swing phase   Dynamic gait: side stepping, marches, retro, and HS curls in PB x2 laps ew  X Sidestepping only today   NK Table (Quad and Hamstring) 2x10 Bilateral  5 second hold LAQ X 7.5# bilateral          Standing gastroc stretch on step 2x Bilateral  20 seconds X    Rockerboard taps and hover fwd/back, side/side 10 taps ew 30 second hover X    Total Gym squats Level J 2x15  X    Resisted Cable Column Walking 3 laps each 25# X Forward and backwards; sidestepping without resistance   Stair training 2x   Ascending and descending stairs twice with B railings- patient able to complete reciprocally with pain. Specific Interventions Next Treatment:  Progress standing LE strengthening. Updated flow sheet to remove supine and seated exercises that patient is performing at home now. Activity/Treatment Tolerance:  [x]  Patient tolerated treatment well  []  Patient limited by fatigue  []  Patient limited by pain   []  Patient limited by medical complications  []  Other:     Assessment:  Initiated Matrix bike today to progress B knee ROM. Patient found this to be challenging today with increased R lateral knee pain and ITBand tightness noted in terminal flexed positions.  Patient continues to need BUE support for squats on foam to maintain balance. Performed sidestepping without resistance (not on cable column) due to R lateral knee pain. Patient is making steady progress at this time and will be returning to work next week. Rescheduled two visits per patient request due to previous illness. GOALS:  Patient Goal: Walk without AD, return to work. Short Term Goals:  Time Frame: 6 weeks    1. Patient will report reduction of B knee pain to less than 2/10 with ability to ascend/descend stairs and perform sit<>stand transfers. 2.  Patient will demonstrate increased B knee AROM to 0-120 degrees to improve car transfer and normalize gait pattern. 3. Patient will be able to perform indep sit<>stand transfer from all surfaces without need for UE support. Long Term Goals:  Time Frame: 12 weeks     1. Patient will demonstrate increased LE strength to greater than or equal to 4+/5 to improve overall standing tolerance and allow patient to return to work. 2.  Patient will demonstrate improved function on LEFS with score of >65/80. 3.  Patient will be independent with comprehensive HEP. 4.  Patient will ambulate without antalgia, unlimited community distances without need for AD demonstrating normalized knee mechanics. Patient Education:   []  HEP/Education Completed: IT band stretch   Medbridge Access Code.   []  No new Education completed  [x]  Reviewed Prior HEP      [x]  Patient verbalized and/or demonstrated understanding of education provided. []  Patient unable to verbalize and/or demonstrate understanding of education provided. Will continue education.   []  Barriers to learning:     PLAN:  Treatment Recommendations: Strengthening, Range of Motion, Balance Training, Functional Mobility Training, Transfer Training, Endurance Training, Gait Training, Stair Training, Neuromuscular Re-education, Manual Therapy - Soft Tissue Mobilization, Pain Management, Home Exercise Program, Patient Education, Safety Education and Training, Positioning, Aquatics and Modalities    []  Plan of care initiated. Plan to see patient 3 times per week for 12 weeks to address the treatment planned outlined above.   [x]  Continue with current plan of care   []  Modify plan of care as follows:    []  Hold pending physician visit   []  Discharge    Time In 1300   Time Out 1359   Timed Code Minutes: 49 min   Total Treatment Time: 59 min       Electronically Signed by: Franco Leslie

## 2021-03-03 ENCOUNTER — HOSPITAL ENCOUNTER (OUTPATIENT)
Dept: PHYSICAL THERAPY | Age: 56
Setting detail: THERAPIES SERIES
Discharge: HOME | End: 2021-03-03
Payer: COMMERCIAL

## 2021-03-03 PROCEDURE — 97110 THERAPEUTIC EXERCISES: CPT

## 2021-03-03 NOTE — PROGRESS NOTES
[address] [facility]  PHYSICAL THERAPY  [] EVALUATION  [x] DAILY NOTE (LAND) [] DAILY NOTE (AQUATIC ) [] PROGRESS NOTE [] DISCHARGE NOTE     [x] OUTPATIENT REHABILITATION [facility]   [] [place] [address]    [] [facility]   [] Sandra Benitez    Date: 3/3/2021  Patient Name:  Selin Osorio  : 1965  MRN: [id number]  CSN: [id number]    Referring Practitioner Gemini Krishna MD   Diagnosis Unilateral primary osteoarthritis, right knee [M17.11]  Unilateral primary osteoarthritis, left knee [M17.12]  Presence of right artificial knee joint [Z96.651]  Presence of left artificial knee joint [Z96.652]    Treatment Diagnosis R knee pain, L knee pain, R knee stiffness, L knee stiffness, Muscle weakness, Difficulty walking, Edema, Unsteadiness   Date of Evaluation 20    Additional Pertinent History Hyperlipidemia, anxiety, obesity, lumbar surgery, hx of B knee scope 2019      Functional Outcome Measure Used LEFS   Functional Outcome Score 31/80 (2021); 580 (20); 80 (20)       Insurance: Primary: Payor: [employer] /  /  / ,   Secondary:    Authorization Information: [employer]; Aquatics, modalities and telehealth covered, NO IONTO   Visit # 22, 9/10 for progress note; 25 total visits approved   Visits Allowed: 2 visits 20 TO 21, Reauthorized for 11 more visits from  through 3/28/21; Reauthorized for 11 more from 21 to 21    Covered codes: 58734, [facility], [phone], [address], [address], [address]   Recertification Date:    Physician Follow-Up: 3/15/2021   Physician Orders:    History of Present Illness: Patient presents status post B TKA performed 2020 by Dr. Dorothy Villareal at [facility]. Patient reports having overnight stay until 2020 in the evening. Patient reports having difficulty with pain control since surgery and is using Norco, Tramadol, and a muscle relaxer with ibuprofen PRN.  Patient continues to use ice as often as possible. Patient is using CPM 6-8 hours per day per leg at this point and was told to do this routine for 30 days. Patient reports current settings of -4 extension and 60 deg flexion. Patient reports insurance will not cover it for that long. Patient reports greatest functional difficulty with transfers, has no chairs in her home that are firm surface with armrests to push up so is having family assistance for transfers and needed therapist assistance from the waiting room chair to get up for eval. Patient reports she is sleeping in recliner, on her back with pillows under ankles only. Patient is having low back pain from abnormal walking pattern. Patient had difficulty with stairs as well. She is wearing B PRITI hose. Patient denies calf pain and is doing HEP from hospital. In August 2019 she had B knee scopes for torn meniscus and pain was persistent so replacement was opted for. SUBJECTIVE:  Patient notes she is feeling better and didn't feel being sick affected her knee progress.        TREATMENT   Precautions:    Pain: 1/10 L knee, 5/10 R knee    X in shaded column indicates activity completed today   Modalities Parameters/  Location  Notes         CP to B knee  10 minutes   Applied post treatment         Manual Therapy Time/Technique  Notes                     Exercise/Intervention   Notes   Heelslides with strap 10B 5\"     Heel Prop    R knee lacking 3 deg from neutral; L knee lacking 2 deg from neutral   Supine knee flexion wall slide  10 B 5 seconds X R =121 degrees L=125 degrees    Side lying hip abduction       Foam with standing exercises:        Standing HR/TR 20B   BUE support   Standing march 20B 2\" X Alternating with BUE support   Standing 3-way hip 20B   BUE support   Standing HS curls 20B   Foam   Mini squats 20  X Foam          TKE 15B 5 sec  Blue band issued   Matrix Bike Seat 8 5 min Level 2 X    Step Ups (forward/lateral) 20x Bilateral each way 6 inch X BUE support   Standing HS and knee flexion stretch (lunging forward with foot on 2nd step) 3x Bilateral  20 seconds X Hamstring only    Flexion stretch with step behind patient and dorsum of foot resting on it. 3x Bilateral  20 seconds X    Gait training  5'  No AD, cues for knee flexion during swing phase   Dynamic gait: side stepping, marches, retro, and HS curls in PB x2 laps ew  X Sidestepping only today   NK Table (Quad and Hamstring) 1x20 Bilateral  5 second hold LAQ X 7.5# bilateral          Standing gastroc stretch on step 2x Bilateral  20 seconds X    Rockerboard taps and hover fwd/back, side/side 10 taps ew 30 second hover X    Total Gym squats Level J 2x15  X    Resisted Cable Column Walking 3 laps each 25# X Forward and backwards; sidestepping without resistance   Stair training 2x   Ascending and descending stairs twice with B railings- patient able to complete reciprocally with pain. Specific Interventions Next Treatment:  Progress standing LE strengthening. Updated flow sheet to remove supine and seated exercises that patient is performing at home now. Activity/Treatment Tolerance:  [x]  Patient tolerated treatment well  []  Patient limited by fatigue  []  Patient limited by pain   []  Patient limited by medical complications  []  Other:     Assessment:  Patient is challenged from an endurance standpoint due to being ill the last week but is able to tolerate today's treatment session fairly well. Patient does demonstrate improved B knee range of motion as indicated with objective measurements. GOALS:  Patient Goal: Walk without AD, return to work. Short Term Goals:  Time Frame: 6 weeks    1. Patient will report reduction of B knee pain to less than 2/10 with ability to ascend/descend stairs and perform sit<>stand transfers. 2.  Patient will demonstrate increased B knee AROM to 0-120 degrees to improve car transfer and normalize gait pattern.    3. Patient will be able to perform indep sit<>stand transfer from all surfaces without need for UE support. Long Term Goals:  Time Frame: 12 weeks     1. Patient will demonstrate increased LE strength to greater than or equal to 4+/5 to improve overall standing tolerance and allow patient to return to work. 2.  Patient will demonstrate improved function on LEFS with score of >65/80. 3.  Patient will be independent with comprehensive HEP. 4.  Patient will ambulate without antalgia, unlimited community distances without need for AD demonstrating normalized knee mechanics. Patient Education:   []  HEP/Education Completed: IT band stretch   Medbridge Access Code.   []  No new Education completed  [x]  Reviewed Prior HEP      [x]  Patient verbalized and/or demonstrated understanding of education provided. []  Patient unable to verbalize and/or demonstrate understanding of education provided. Will continue education. []  Barriers to learning:     PLAN:  Treatment Recommendations: Strengthening, Range of Motion, Balance Training, Functional Mobility Training, Transfer Training, Endurance Training, Gait Training, Stair Training, Neuromuscular Re-education, Manual Therapy - Soft Tissue Mobilization, Pain Management, Home Exercise Program, Patient Education, Safety Education and Training, Positioning, Aquatics and Modalities    []  Plan of care initiated. Plan to see patient 3 times per week for 12 weeks to address the treatment planned outlined above.   [x]  Continue with current plan of care   []  Modify plan of care as follows:    []  Hold pending physician visit   []  Discharge    Time In 1603   Time Out 1648   Timed Code Minutes: 45 min   Total Treatment Time: 45 min       Electronically Signed by: Paola Rainey

## 2021-03-17 ENCOUNTER — APPOINTMENT (OUTPATIENT)
Dept: PHYSICAL THERAPY | Age: 56
End: 2021-03-17
Payer: COMMERCIAL

## 2021-03-22 ENCOUNTER — HOSPITAL ENCOUNTER (OUTPATIENT)
Dept: PHYSICAL THERAPY | Age: 56
Setting detail: THERAPIES SERIES
Discharge: HOME | End: 2021-03-22
Payer: COMMERCIAL

## 2021-03-22 PROCEDURE — 97110 THERAPEUTIC EXERCISES: CPT

## 2021-03-22 NOTE — DISCHARGE SUMMARY
[address] [facility]  PHYSICAL THERAPY  [] EVALUATION  [] DAILY NOTE (LAND) [] DAILY NOTE (AQUATIC )  [] PROGRESS NOTE [x] DISCHARGE NOTE     [x] OUTPATIENT REHABILITATION [facility]   [] [place] [address]    [] [facility]   [] [place] [facility]    Date: 3/22/2021  Patient Name:  Yazmin Antoine  : 1965  MRN: [id number]  CSN: [id number]    Referring Practitioner Laurie Scruggs MD   Diagnosis Unilateral primary osteoarthritis, right knee [M17.11]  Unilateral primary osteoarthritis, left knee [M17.12]  Presence of right artificial knee joint [Z96.651]  Presence of left artificial knee joint [Z96.652]    Treatment Diagnosis R knee pain, L knee pain, R knee stiffness, L knee stiffness, Muscle weakness, Difficulty walking, Edema, Unsteadiness   Date of Evaluation 20    Additional Pertinent History Hyperlipidemia, anxiety, obesity, lumbar surgery, hx of B knee scope 2019      Functional Outcome Measure Used LEFS   Functional Outcome Score 42/80 (3/22/21); 31/80 (2021); 5/80 (20); /80 (20)       Insurance: Primary: Payor: Erma Granados /  /  / ,   Secondary:    Authorization Information: [employer]; Aquatics, modalities and telehealth covered, NO IONTO   Visit # 23, 10/10 for progress note; 25 total visits approved   Visits Allowed: 2 visits 20 TO 21, Reauthorized for 11 more visits from  through 3/28/21; Reauthorized for 11 more from 21 to 21    Covered codes: 92355, [id number], [facility], [id number], 97429, 53675   Recertification Date: 7218   Physician Follow-Up: 2021   Physician Orders:    History of Present Illness: Patient presents status post B TKA performed 2020 by Dr. Martín De La Cruz at [facility]. Patient reports having overnight stay until 2020 in the evening. Patient reports having difficulty with pain control since surgery and is using Norco, Tramadol, and a muscle relaxer with ibuprofen PRN.  Patient continues to use ice as often as possible. Patient is using CPM 6-8 hours per day per leg at this point and was told to do this routine for 30 days. Patient reports current settings of -4 extension and 60 deg flexion. Patient reports insurance will not cover it for that long. Patient reports greatest functional difficulty with transfers, has no chairs in her home that are firm surface with armrests to push up so is having family assistance for transfers and needed therapist assistance from the waiting room chair to get up for eval. Patient reports she is sleeping in recliner, on her back with pillows under ankles only. Patient is having low back pain from abnormal walking pattern. Patient had difficulty with stairs as well. She is wearing B PRITI hose. Patient denies calf pain and is doing HEP from hospital. In August 2019 she had B knee scopes for torn meniscus and pain was persistent so replacement was opted for. SUBJECTIVE:  Patient has now been back to work for 3 weeks and reports the first week was challenging from a knee pain and swelling standpoint due to the amount of sitting she had to do which lead to significant stiffness. Patient reports it has gradually improved. Patient continues to use cane for long distance walking within the community. Patient reports her work does not have any handicap accessible toilet stalls which has been very problematic so she brought her walker in to use that for support to achieve standing. Patient rates overall improvement since evaluation 90%. Stair climbing is improving and is less painful but she still tends to climb in a non-reciprocal pattern. Patient reports her knees do at times wake her at night. Patient has been doing HEP once at night and while sitting at desk at work. In the past month away from PT, she feels she has progressed with regards to mobility. Patient reports R lateral knee pain persists along distal ITBand.  Patient still at times takes Norco when pain is elevated but otherwise manages it with ibuprofen. Patient saw surgeon last week who instructed patient to focus on HEP and continue moving for best outcome long term. Patient is ready for discharge at this time. TREATMENT   Precautions:    Pain: 2/10 L knee, 4/10 R knee    X in shaded column indicates activity completed today   Modalities Parameters/  Location  Notes         CP to B knee  10 minutes   Applied post treatment         Manual Therapy Time/Technique  Notes                     Exercise/Intervention   Notes   Heelslides with strap 10B 5\"     Heel Prop       Supine knee flexion wall slide  10 B 5 seconds     Side lying hip abduction       Foam with standing exercises:        Standing HR/TR 20B   BUE support   Standing march 20B 2\" X Alternating with BUE support   Standing 3-way hip 20B   BUE support   Standing HS curls 20B   Foam   Mini squats 20  X Foam          TKE 15B 5 sec  Blue band issued   Matrix Bike Seat 8 5 min Level 2 X    Step Ups (forward/lateral) 20x Bilateral each way 6 inch X BUE support   Standing HS and knee flexion stretch (lunging forward with foot on 2nd step) 3x Bilateral  20 seconds X Hamstring only    Flexion stretch with step behind patient and dorsum of foot resting on it. 3x Bilateral  20 seconds X    Gait training  5'  No AD, cues for knee flexion during swing phase   Dynamic gait: side stepping, marches, retro, and HS curls in PB x2 laps ew  X Sidestepping only today   NK Table (Quad and Hamstring) 1x20 Bilateral  5 second hold LAQ X 7.5# bilateral          Standing gastroc stretch on step 2x Bilateral  20 seconds X    Rockerboard taps and hover fwd/back, side/side 10 taps ew 30 second hover X    Total Gym squats Level J 2x15  X    Resisted Cable Column Walking 3 laps each 25#  Forward and backwards; sidestepping without resistance   Stair training 2x   Ascending and descending stairs twice with B railings- patient able to complete reciprocally with pain. Specific Interventions Next Treatment:  Progress standing LE strengthening. Updated flow sheet to remove supine and seated exercises that patient is performing at home now. Activity/Treatment Tolerance:  [x]  Patient tolerated treatment well  []  Patient limited by fatigue  []  Patient limited by pain   []  Patient limited by medical complications  []  Other:     Assessment:  Patient subjectively reports 90% improvement since surgery and has returned to work 3 weeks ago without restrictions. Patient has encountered some accessibility issues in the bathrooms and office setting but has found ways to adapt. Patient continues to use cane for long community distance ambulation due to ongoing pain in R>L knee. Pain ratings are improving slowly but R lateral knee remains painful along ITBand. Patient demonstrates significantly improved knee AROM and strength has increased as well with those goals being met. Although LEFS score did not achieve goal, it did improve from 4/80 at evaluation to 42/80 today. See goal status below. GOALS:  Patient Goal: Walk without AD, return to work. Short Term Goals:  Time Frame: 6 weeks    1. Patient will report reduction of B knee pain to less than 2/10 with ability to ascend/descend stairs and perform sit<>stand transfers. GOAL NOT MET- Patient reports pain at it's worst still elevates to 6/10 at times with average pain rated 3/10 with R>L usually along ITBand. 2.  Patient will demonstrate increased B knee AROM to 0-120 degrees to improve car transfer and normalize gait pattern. GOAL PARTIALLY MET- All motions achieved except R knee extension. LOWER EXTREMITY RANGE OF MOTION    Left Right COMMENTS      Knee Flexion 126 deg 124 deg    Knee Extension 0 deg Lacking 1 deg from neutral    Knee Range of Motion is [facility]  [x]       3. Patient will be able to perform indep sit<>stand transfer from all surfaces without need for UE support.   GOAL PARTIALLY MET- Patient still has difficulty with transfers without UE support. Avoids chairs that do not have armrests. Long Term Goals:  Time Frame: 12 weeks     1. Patient will demonstrate increased LE strength to greater than or equal to 4+/5 to improve overall standing tolerance and allow patient to return to work. GOAL MET- see chart below. LE STRENGTH R L   Hip Flexion 4+/5 4+/5   Hip Abduction 4+/5 4/5   Hip Adduction 5/5 5/5   Knee Flexion 4+/5 4+/5   Knee Extension 4+/5 4+/5     2. Patient will demonstrate improved function on LEFS with score of >65/80. GOAL NOT MET, but significantly improved - Score today 42/80 compared to 4/80 at evaluation. 3.  Patient will be independent with comprehensive HEP. GOAL MET- Patient is doing HEP once daily since return to work. 4.  Patient will ambulate without antalgia, unlimited community distances without need for AD demonstrating normalized knee mechanics. GOAL PARTIALLY MET- Patient can ambulate community distances but prefers to use cane due to R knee pain. Mild antalgia persists but heelstrike on initial contact and swing phase knee flexion are both improved. Patient Education:   []  HEP/Education Completed: IT band stretch   Medbridge Access Code.   []  No new Education completed  [x]  Reviewed Prior HEP      [x]  Patient verbalized and/or demonstrated understanding of education provided. []  Patient unable to verbalize and/or demonstrate understanding of education provided. Will continue education. []  Barriers to learning:     PLAN:  Treatment Recommendations: Strengthening, Range of Motion, Balance Training, Functional Mobility Training, Transfer Training, Endurance Training, Gait Training, Stair Training, Neuromuscular Re-education, Manual Therapy - Soft Tissue Mobilization, Pain Management, Home Exercise Program, Patient Education, Safety Education and Training, Positioning, Aquatics and Modalities    []  Plan of care initiated.   Plan to see patient 3 times per week for 12 weeks to address the treatment planned outlined above.   []  Continue with current plan of care   []  Modify plan of care as follows:    []  Hold pending physician visit   [x]  Discharge    Time In 1625   Time Out 1710   Timed Code Minutes: 45 min   Total Treatment Time: 45 min       Electronically Signed by: Zack Watson

## 2021-04-20 LAB — SARS-COV-2: DETECTED

## 2021-04-23 ENCOUNTER — HOSPITAL ENCOUNTER (INPATIENT)
Age: 56
LOS: 6 days | Discharge: HOME | DRG: 177 | End: 2021-04-29
Attending: FAMILY MEDICINE | Admitting: INTERNAL MEDICINE
Payer: COMMERCIAL

## 2021-04-23 ENCOUNTER — APPOINTMENT (OUTPATIENT)
Dept: GENERAL RADIOLOGY | Age: 56
DRG: 177 | End: 2021-04-23
Payer: COMMERCIAL

## 2021-04-23 DIAGNOSIS — R09.02 HYPOXIA: ICD-10-CM

## 2021-04-23 DIAGNOSIS — J12.82 PNEUMONIA DUE TO COVID-19 VIRUS: Primary | ICD-10-CM

## 2021-04-23 DIAGNOSIS — U07.1 PNEUMONIA DUE TO COVID-19 VIRUS: Primary | ICD-10-CM

## 2021-04-23 PROBLEM — J96.00 ACUTE RESPIRATORY FAILURE DUE TO COVID-19 (HCC): Status: ACTIVE | Noted: 2021-04-23

## 2021-04-23 LAB
ALBUMIN SERPL-MCNC: 3.8 G/DL (ref 3.5–5.1)
ALP BLD-CCNC: 50 U/L (ref 38–126)
ALT SERPL-CCNC: 19 U/L (ref 11–66)
ANION GAP SERPL CALCULATED.3IONS-SCNC: 15 MEQ/L (ref 8–16)
AST SERPL-CCNC: 30 U/L (ref 5–40)
BASOPHILS # BLD: 0.2 %
BASOPHILS ABSOLUTE: 0 THOU/MM3 (ref 0–0.1)
BILIRUB SERPL-MCNC: 0.4 MG/DL (ref 0.3–1.2)
BUN BLDV-MCNC: 19 MG/DL (ref 7–22)
CALCIUM SERPL-MCNC: 8.6 MG/DL (ref 8.5–10.5)
CHLORIDE BLD-SCNC: 99 MEQ/L (ref 98–111)
CO2: 22 MEQ/L (ref 23–33)
CREAT SERPL-MCNC: 0.6 MG/DL (ref 0.4–1.2)
D-DIMER QUANTITATIVE: 1145 NG/ML FEU (ref 0–500)
EKG ATRIAL RATE: 77 BPM
EKG P AXIS: 27 DEGREES
EKG P-R INTERVAL: 146 MS
EKG Q-T INTERVAL: 384 MS
EKG QRS DURATION: 82 MS
EKG QTC CALCULATION (BAZETT): 434 MS
EKG R AXIS: 3 DEGREES
EKG T AXIS: 33 DEGREES
EKG VENTRICULAR RATE: 77 BPM
EOSINOPHIL # BLD: 0 %
EOSINOPHILS ABSOLUTE: 0 THOU/MM3 (ref 0–0.4)
ERYTHROCYTE [DISTWIDTH] IN BLOOD BY AUTOMATED COUNT: 14.9 % (ref 11.5–14.5)
ERYTHROCYTE [DISTWIDTH] IN BLOOD BY AUTOMATED COUNT: 44.1 FL (ref 35–45)
GFR SERPL CREATININE-BSD FRML MDRD: > 90 ML/MIN/1.73M2
GLUCOSE BLD-MCNC: 148 MG/DL (ref 70–108)
HCT VFR BLD CALC: 42.9 % (ref 37–47)
HEMOGLOBIN: 13.8 GM/DL (ref 12–16)
IMMATURE GRANS (ABS): 0.02 THOU/MM3 (ref 0–0.07)
IMMATURE GRANULOCYTES: 0.4 %
LACTIC ACID, SEPSIS: 1.5 MMOL/L (ref 0.5–1.9)
LACTIC ACID, SEPSIS: 1.9 MMOL/L (ref 0.5–1.9)
LYMPHOCYTES # BLD: 9.8 %
LYMPHOCYTES ABSOLUTE: 0.5 THOU/MM3 (ref 1–4.8)
MCH RBC QN AUTO: 26.2 PG (ref 26–33)
MCHC RBC AUTO-ENTMCNC: 32.2 GM/DL (ref 32.2–35.5)
MCV RBC AUTO: 81.4 FL (ref 81–99)
MONOCYTES # BLD: 5 %
MONOCYTES ABSOLUTE: 0.3 THOU/MM3 (ref 0.4–1.3)
NUCLEATED RED BLOOD CELLS: 0 /100 WBC
OSMOLALITY CALCULATION: 277 MOSMOL/KG (ref 275–300)
PLATELET # BLD: 216 THOU/MM3 (ref 130–400)
PMV BLD AUTO: 9.9 FL (ref 9.4–12.4)
POTASSIUM REFLEX MAGNESIUM: 4.1 MEQ/L (ref 3.5–5.2)
PRO-BNP: 62.7 PG/ML (ref 0–900)
PROCALCITONIN: 0.08 NG/ML (ref 0.01–0.09)
RBC # BLD: 5.27 MILL/MM3 (ref 4.2–5.4)
SEG NEUTROPHILS: 84.6 %
SEGMENTED NEUTROPHILS ABSOLUTE COUNT: 4.4 THOU/MM3 (ref 1.8–7.7)
SODIUM BLD-SCNC: 136 MEQ/L (ref 135–145)
TOTAL PROTEIN: 7.3 G/DL (ref 6.1–8)
TROPONIN T: < 0.01 NG/ML
WBC # BLD: 5.2 THOU/MM3 (ref 4.8–10.8)

## 2021-04-23 PROCEDURE — 83605 ASSAY OF LACTIC ACID: CPT

## 2021-04-23 PROCEDURE — 6370000000 HC RX 637 (ALT 250 FOR IP): Performed by: INTERNAL MEDICINE

## 2021-04-23 PROCEDURE — 93010 ELECTROCARDIOGRAM REPORT: CPT | Performed by: INTERNAL MEDICINE

## 2021-04-23 PROCEDURE — 6360000002 HC RX W HCPCS: Performed by: INTERNAL MEDICINE

## 2021-04-23 PROCEDURE — 99223 1ST HOSP IP/OBS HIGH 75: CPT | Performed by: INTERNAL MEDICINE

## 2021-04-23 PROCEDURE — 36415 COLL VENOUS BLD VENIPUNCTURE: CPT

## 2021-04-23 PROCEDURE — 80053 COMPREHEN METABOLIC PANEL: CPT

## 2021-04-23 PROCEDURE — 71045 X-RAY EXAM CHEST 1 VIEW: CPT

## 2021-04-23 PROCEDURE — 84145 PROCALCITONIN (PCT): CPT

## 2021-04-23 PROCEDURE — 84484 ASSAY OF TROPONIN QUANT: CPT

## 2021-04-23 PROCEDURE — 99284 EMERGENCY DEPT VISIT MOD MDM: CPT

## 2021-04-23 PROCEDURE — 83880 ASSAY OF NATRIURETIC PEPTIDE: CPT

## 2021-04-23 PROCEDURE — 1200000003 HC TELEMETRY R&B

## 2021-04-23 PROCEDURE — 87040 BLOOD CULTURE FOR BACTERIA: CPT

## 2021-04-23 PROCEDURE — 85379 FIBRIN DEGRADATION QUANT: CPT

## 2021-04-23 PROCEDURE — 2580000003 HC RX 258: Performed by: INTERNAL MEDICINE

## 2021-04-23 PROCEDURE — 85025 COMPLETE CBC W/AUTO DIFF WBC: CPT

## 2021-04-23 PROCEDURE — 93005 ELECTROCARDIOGRAM TRACING: CPT | Performed by: FAMILY MEDICINE

## 2021-04-23 RX ORDER — SODIUM CHLORIDE 9 MG/ML
25 INJECTION, SOLUTION INTRAVENOUS PRN
Status: DISCONTINUED | OUTPATIENT
Start: 2021-04-23 | End: 2021-04-29 | Stop reason: HOSPADM

## 2021-04-23 RX ORDER — TRIAMTERENE AND HYDROCHLOROTHIAZIDE 37.5; 25 MG/1; MG/1
0.5 TABLET ORAL DAILY
COMMUNITY

## 2021-04-23 RX ORDER — ACETAMINOPHEN 650 MG/1
650 SUPPOSITORY RECTAL EVERY 6 HOURS PRN
Status: DISCONTINUED | OUTPATIENT
Start: 2021-04-23 | End: 2021-04-29 | Stop reason: HOSPADM

## 2021-04-23 RX ORDER — PANTOPRAZOLE SODIUM 40 MG/1
40 TABLET, DELAYED RELEASE ORAL
Status: DISCONTINUED | OUTPATIENT
Start: 2021-04-24 | End: 2021-04-29 | Stop reason: HOSPADM

## 2021-04-23 RX ORDER — DEXAMETHASONE 4 MG/1
6 TABLET ORAL DAILY
Status: DISCONTINUED | OUTPATIENT
Start: 2021-04-23 | End: 2021-04-29 | Stop reason: HOSPADM

## 2021-04-23 RX ORDER — ATORVASTATIN CALCIUM 10 MG/1
10 TABLET, FILM COATED ORAL DAILY
Status: DISCONTINUED | OUTPATIENT
Start: 2021-04-23 | End: 2021-04-29 | Stop reason: HOSPADM

## 2021-04-23 RX ORDER — VITAMIN B COMPLEX
1000 TABLET ORAL DAILY
Status: DISCONTINUED | OUTPATIENT
Start: 2021-04-23 | End: 2021-04-29 | Stop reason: HOSPADM

## 2021-04-23 RX ORDER — ACETAMINOPHEN 325 MG/1
650 TABLET ORAL EVERY 6 HOURS PRN
Status: DISCONTINUED | OUTPATIENT
Start: 2021-04-23 | End: 2021-04-29 | Stop reason: HOSPADM

## 2021-04-23 RX ORDER — DEXAMETHASONE SODIUM PHOSPHATE 4 MG/ML
4 INJECTION, SOLUTION INTRA-ARTICULAR; INTRALESIONAL; INTRAMUSCULAR; INTRAVENOUS; SOFT TISSUE ONCE
Status: DISCONTINUED | OUTPATIENT
Start: 2021-04-23 | End: 2021-04-23

## 2021-04-23 RX ORDER — SODIUM CHLORIDE 0.9 % (FLUSH) 0.9 %
5-40 SYRINGE (ML) INJECTION PRN
Status: DISCONTINUED | OUTPATIENT
Start: 2021-04-23 | End: 2021-04-29 | Stop reason: HOSPADM

## 2021-04-23 RX ORDER — ONDANSETRON 2 MG/ML
4 INJECTION INTRAMUSCULAR; INTRAVENOUS EVERY 6 HOURS PRN
Status: DISCONTINUED | OUTPATIENT
Start: 2021-04-23 | End: 2021-04-29 | Stop reason: HOSPADM

## 2021-04-23 RX ORDER — SODIUM CHLORIDE 0.9 % (FLUSH) 0.9 %
5-40 SYRINGE (ML) INJECTION EVERY 12 HOURS SCHEDULED
Status: DISCONTINUED | OUTPATIENT
Start: 2021-04-23 | End: 2021-04-29 | Stop reason: HOSPADM

## 2021-04-23 RX ORDER — DEXAMETHASONE 6 MG/1
6 TABLET ORAL
Status: ON HOLD | COMMUNITY
Start: 2021-04-19 | End: 2021-04-29 | Stop reason: HOSPADM

## 2021-04-23 RX ORDER — MULTIVIT WITH MINERALS/LUTEIN
2000 TABLET ORAL NIGHTLY
COMMUNITY

## 2021-04-23 RX ORDER — ZINC SULFATE 50(220)MG
50 CAPSULE ORAL DAILY
Status: DISCONTINUED | OUTPATIENT
Start: 2021-04-23 | End: 2021-04-29 | Stop reason: HOSPADM

## 2021-04-23 RX ORDER — ASCORBIC ACID 500 MG
1000 TABLET ORAL DAILY
Status: DISCONTINUED | OUTPATIENT
Start: 2021-04-23 | End: 2021-04-29 | Stop reason: HOSPADM

## 2021-04-23 RX ORDER — ESCITALOPRAM OXALATE 10 MG/1
20 TABLET ORAL DAILY
COMMUNITY

## 2021-04-23 RX ORDER — M-VIT,TX,IRON,MINS/CALC/FOLIC 27MG-0.4MG
1 TABLET ORAL 2 TIMES DAILY
Status: DISCONTINUED | OUTPATIENT
Start: 2021-04-23 | End: 2021-04-29 | Stop reason: HOSPADM

## 2021-04-23 RX ORDER — POLYETHYLENE GLYCOL 3350 17 G/17G
17 POWDER, FOR SOLUTION ORAL DAILY PRN
Status: DISCONTINUED | OUTPATIENT
Start: 2021-04-23 | End: 2021-04-29 | Stop reason: HOSPADM

## 2021-04-23 RX ORDER — LORAZEPAM 0.5 MG/1
0.5 TABLET ORAL DAILY PRN
Status: DISCONTINUED | OUTPATIENT
Start: 2021-04-23 | End: 2021-04-29 | Stop reason: HOSPADM

## 2021-04-23 RX ORDER — PROMETHAZINE HYDROCHLORIDE 25 MG/1
12.5 TABLET ORAL EVERY 6 HOURS PRN
Status: DISCONTINUED | OUTPATIENT
Start: 2021-04-23 | End: 2021-04-29 | Stop reason: HOSPADM

## 2021-04-23 RX ORDER — ESCITALOPRAM OXALATE 20 MG/1
20 TABLET ORAL DAILY
Status: DISCONTINUED | OUTPATIENT
Start: 2021-04-23 | End: 2021-04-29 | Stop reason: HOSPADM

## 2021-04-23 RX ORDER — VITAMIN B COMPLEX
1 CAPSULE ORAL DAILY
COMMUNITY

## 2021-04-23 RX ORDER — ASPIRIN 81 MG/1
81 TABLET ORAL DAILY
Status: DISCONTINUED | OUTPATIENT
Start: 2021-04-23 | End: 2021-04-29 | Stop reason: HOSPADM

## 2021-04-23 RX ADMIN — Medication 50 MG: at 17:41

## 2021-04-23 RX ADMIN — ESCITALOPRAM 20 MG: 20 TABLET, FILM COATED ORAL at 17:41

## 2021-04-23 RX ADMIN — ASPIRIN 81 MG: 81 TABLET, COATED ORAL at 17:40

## 2021-04-23 RX ADMIN — Medication 1000 UNITS: at 17:41

## 2021-04-23 RX ADMIN — ATORVASTATIN CALCIUM 10 MG: 10 TABLET, FILM COATED ORAL at 20:17

## 2021-04-23 RX ADMIN — Medication 1000 UNITS: at 17:42

## 2021-04-23 RX ADMIN — OXYCODONE HYDROCHLORIDE AND ACETAMINOPHEN 1000 MG: 500 TABLET ORAL at 17:41

## 2021-04-23 RX ADMIN — ENOXAPARIN SODIUM 40 MG: 40 INJECTION SUBCUTANEOUS at 17:40

## 2021-04-23 RX ADMIN — SODIUM CHLORIDE, PRESERVATIVE FREE 10 ML: 5 INJECTION INTRAVENOUS at 20:17

## 2021-04-23 RX ADMIN — DEXAMETHASONE 6 MG: 4 TABLET ORAL at 17:41

## 2021-04-23 RX ADMIN — MULTIPLE VITAMINS W/ MINERALS TAB 1 TABLET: TAB at 20:17

## 2021-04-23 ASSESSMENT — PAIN DESCRIPTION - FREQUENCY: FREQUENCY: INTERMITTENT

## 2021-04-23 ASSESSMENT — PAIN SCALES - GENERAL: PAINLEVEL_OUTOF10: 0

## 2021-04-23 NOTE — ED NOTES
Pt ambulated to bathroom and back to bed at this time. She reports worsening shortness of breath with ambulation.      Gisele Mead RN  04/23/21 9157

## 2021-04-23 NOTE — ED PROVIDER NOTES
[address] COMPLAINT   Chief Complaint   Patient presents with    Shortness of Breath    Positive For Covid-19        HPI   Khadra Gee is a 48 y.o. female previously healthy who presents with shortness of breath and flu like symptoms since last Wednesday, onset was 9 days ago. Pt's  contracted COVID several days prior to the onset of her symptoms. The duration has been constant. She has chills with cough without fever. She got tested at a [employer] on  and was notified that she was positive on . Pt came today due to worsening shortness of breath    REVIEW OF SYSTEMS   Cardiac: +dyspnea on exertion; neg Chest Pain, Denies syncope   Respiratory: +sob and dyspnea; Denies cough or hemoptysis   GI: Denies Vomiting or Diarrhea   General: +flu like symptoms such as myalgia; no Fever   Neuro: +headache  All other review of systems are reviewed and are otherwise negative.      PAST MEDICAL & SURGICAL HISTORY   Past Medical History:   Diagnosis Date    Anxiety     Depression     Hyperlipidemia     Hypertension     Smoker       Past Surgical History:   Procedure Laterality Date     SECTION      x 3    DENTAL SURGERY      implants    EGD COLONOSCOPY Left 10/21/2019    EGD ESOPHAGOGASTRODUODENOSCOPY DILATATION performed by Bjorn Payne MD at [facility]    ENDOSCOPY, COLON, DIAGNOSTIC      HYSTERECTOMY      endometriosis    KNEE ARTHROSCOPY Bilateral         CURRENT MEDICATIONS        ALLERGIES   Allergies   Allergen Reactions    Sulfa Antibiotics Rash    Demerol      BP decrease, dizziness    Amoxicillin-Pot Clavulanate Rash        SOCIAL & FAMILY HISTORY   Social History     Socioeconomic History    Marital status: Single     Spouse name: None    Number of children: None    Years of education: None    Highest education level: None   Occupational History    Occupation:      Employer: [address] [place] [address] Financial resource strain: None    Food insecurity     Worry: None     Inability: None    Transportation needs     Medical: None     Non-medical: None   Tobacco Use    Smoking status: Former Smoker     Packs/day: 0.25     Years: 25.00     Pack years: 6.25     Types: Cigarettes     Quit date: 2014     Years since quittin.4    Smokeless tobacco: Never Used    Tobacco comment: working on quitting.     Substance and Sexual Activity    Alcohol use: No    Drug use: No    Sexual activity: None   Lifestyle    Physical activity     Days per week: None     Minutes per session: None    Stress: None   Relationships    Social connections     Talks on phone: None     Gets together: None     Attends Cheondoism service: None     Active member of club or organization: None     Attends meetings of clubs or organizations: None     Relationship status: None    Intimate partner violence     Fear of current or ex partner: None     Emotionally abused: None     Physically abused: None     Forced sexual activity: None   Other Topics Concern    None   Social History Narrative    None      Family History   Problem Relation Age of Onset    Diabetes Father     Cancer Maternal Grandfather     Diabetes Paternal Grandmother         PHYSICAL EXAM   VITAL SIGNS: /73   Pulse 72   Temp 98.4 °F (36.9 °C) (Oral)   Resp 18   Ht 5' 7.5\" (1.715 m)   Wt 240 lb (108.9 kg)   SpO2 94%   BMI 37.03 kg/m²    Constitutional: Well developed, well nourished, mild acute distress   HENT: Atraumatic, moist mucus membranes; able to speak in complete sentences  Neck: supple, no JVD   Respiratory: Lungs showed bibasilar crackles with mild tachypnea, no retractions   Cardiovascular: Reg rate, normal rhythm, no murmurs   Vascular: Radial pulses 2+ equal bilaterally   GI: Soft, nontender, normal bowel sounds   Musculoskeletal: No edema, no deformities   Integument: Skin warm and dry, no petechiae   Neurologic: Alert & oriented, normal speech   Psych: Pleasant affect, no hallucinations     EKG (interpreted by me) 4/23/21 11:39  Rhythm: Sinus   Rate: 77 BPM   Axis: normal   Conduction: normal   ST/T Segments: nonacute     RADIOLOGY/PROCEDURES   XR CHEST PORTABLE   Final Result   Bilateral patchy opacities as evidence for viral pneumonia. **This report has been created using voice recognition software. It may contain minor errors which are inherent in voice recognition technology. **      Final report electronically signed by Dr. Latricia Angel MD on 4/23/2021 12:35 PM           LABS   Labs Reviewed   CBC WITH AUTO DIFFERENTIAL - Abnormal; Notable for the following components:       Result Value    RDW-CV 14.9 (*)     Lymphocytes Absolute 0.5 (*)     Monocytes Absolute 0.3 (*)     All other components within normal limits   COMPREHENSIVE METABOLIC PANEL W/ REFLEX TO MG FOR LOW K - Abnormal; Notable for the following components:    Glucose 148 (*)     CO2 22 (*)     All other components within normal limits   D-DIMER, QUANTITATIVE - Abnormal; Notable for the following components:    D-Dimer, Quant 1145.00 (*)     All other components within normal limits   CULTURE, BLOOD 1   CULTURE, BLOOD 2   LACTATE, SEPSIS   BRAIN NATRIURETIC PEPTIDE   TROPONIN   ANION GAP   OSMOLALITY   GLOMERULAR FILTRATION RATE, ESTIMATED   LACTATE, SEPSIS   PROCALCITONIN        ED COURSE & MEDICAL DECISION MAKING   Pertinent Labs & Imaging studies reviewed and interpreted. (See chart for details)   See chart for details of medications given during the ED stay. Vitals:    04/23/21 1313   BP: 103/73   Pulse: 72   Resp: 18   Temp:    SpO2: 94%        Consultation: hospital medicine admission for COVID pneumonia with hypoxia    Differential Diagnosis: COVID pneumonia, hypoxia, Acute Coronary Syndrome, Congestive Heart Failure, Myocardial Infarction, Pulmonary Embolus, Thoracic Dissection, Pneumonia, Pneumothorax, other. FINAL IMPRESSION   1.  Pneumonia

## 2021-04-23 NOTE — FLOWSHEET NOTE
Advanced Directives Consult: Pt was not ready for the completion at the time of the visit. Prayer was appreciated.       04/23/21 2622   Encounter Summary   Services provided to: Patient   Referral/Consult From: Rounding   Continue Visiting Yes  (4/23 P)   Advance Care Planning Yes   Routine   Type Initial   Assessment Approachable;Calm   Intervention [place];Prayer;Nurtured hope   Outcome Acceptance;Expressed gratitude

## 2021-04-23 NOTE — H&P
History & Physical        Patient:  Florin Kohler  YOB: 1965    MRN: [id number]     Acct: [de-identified]    PCP: Romi Escobar MD    Date of Admission: 2021    Date of Service: Pt seen/examined on 2021   and Admitted to Inpatient with expected LOS greater than two midnights due to medical therapy. Chief Complaint:  Fevers, chills, cough, sob, diarrhea, muscle aches, weakness, fatigue       History Of Present Illness:      48 y.o. female who presented to [address] [place] [address] with complaints of being diagnosed with COVID-19 on 21. Pt her brother recently had COVID who she thinks she got it from. Did not get vaccinated. Reports to worsening symptoms of daily fevers, chills, cough, sob, diarrhea, muscle aches, weakness, fatigue. Reports her sob started to get much worse few days ago coming to the ED. Pt reports having had a virtual visit with her PCP who gave her Decadron, of which she took 4 pills. Pt has not been eating well. In the ED, pt hypoxic 85% on RA. Placed on 3L NC. Labs unremarkable. CXR showing B/L patchy opacities. Past Medical History:          Diagnosis Date    Anxiety     Depression     Hyperlipidemia     Hypertension     Smoker        Past Surgical History:          Procedure Laterality Date     SECTION      x 3    DENTAL SURGERY      implants    EGD COLONOSCOPY Left 10/21/2019    EGD ESOPHAGOGASTRODUODENOSCOPY DILATATION performed by Greg Kendall MD at [facility]    ENDOSCOPY, COLON, DIAGNOSTIC      HYSTERECTOMY      endometriosis    KNEE ARTHROSCOPY Bilateral        Medications Prior to Admission:      Prior to Admission medications    Medication Sig Start Date End Date Taking?  Authorizing Provider   vitamin C (ASCORBIC ACID) 500 MG tablet Take 500 mg by mouth daily    Historical Provider, MD   vitamin E 1000 units capsule Take 1,000 Units by mouth daily    Historical Provider, MD   [place]-3 1000 MG CAPS Take 1 capsule by mouth daily    Historical Provider, MD   Dextromethorphan-Guaifenesin [facility] DM)  MG TB12 Take 1 tablet by mouth 2 times daily 3/25/19   GORAN Chawla CNP   omeprazole (PRILOSEC) 40 MG delayed release capsule Take 40 mg by mouth daily    Historical Provider, MD   NONFORMULARY \"I take half of a water pill everyday for my BP\"    Historical Provider, MD   Venlafaxine HCl (EFFEXOR XR PO) Take by mouth    Historical Provider, MD   LORazepam (ATIVAN) 0.5 MG tablet Take 1 tablet by mouth daily as needed for Anxiety 7/27/15   George Johns MD   simvastatin (ZOCOR) 20 MG tablet Take 1 tablet by mouth every evening. 1/26/15   George Johns MD   Coenzyme Q10 (CO Q-10) 400 MG CAPS Take 100 mg by mouth. Historical Provider, MD   Probiotic Product (PROBIOTIC PEARLS PO) Take  by mouth daily. Historical Provider, MD   therapeutic multivitamin-minerals (THERAGRAN-M) tablet Take 1 tablet by mouth 2 times daily. Historical Provider, MD   aspirin 81 MG EC tablet Take 81 mg by mouth daily. Historical Provider, MD   CALCIUM-VITAMIN D PO Take 1 tablet by mouth daily. Historical Provider, MD       Allergies:  Sulfa antibiotics, Demerol, and Amoxicillin-pot clavulanate    Social History:     Social History     Socioeconomic History    Marital status: Single     Spouse name: None    Number of children: None    Years of education: None    Highest education level: None   Occupational History    Occupation:      Employer: [employer]   Social Needs    Financial resource strain: None    Food insecurity     Worry: None     Inability: None    Transportation needs     Medical: None     Non-medical: None   Tobacco Use    Smoking status: Former Smoker     Packs/day: 0.25     Years: 25.00     Pack years: 6.25     Types: Cigarettes     Quit date: 2014     Years since quittin.4    Smokeless tobacco: Never Used    Tobacco comment: working on quitting. Substance and Sexual Activity    Alcohol use: No    Drug use: No    Sexual activity: None   Lifestyle    Physical activity     Days per week: None     Minutes per session: None    Stress: None   Relationships    Social connections     Talks on phone: None     Gets together: None     Attends [employer] service: None     Active member of club or organization: None     Attends meetings of clubs or organizations: None     Relationship status: None    Intimate partner violence     Fear of current or ex partner: None     Emotionally abused: None     Physically abused: None     Forced sexual activity: None   Other Topics Concern    None   Social History Narrative    None       Family History:       Reviewed in detail and negative for DM, CAD, Cancer, CVA. Positive as follows:        Problem Relation Age of Onset    Diabetes Father     Cancer Maternal Grandfather     Diabetes Paternal Grandmother        Diet:  No diet orders on file    REVIEW OF SYSTEMS:   Pertinent positives as noted in the HPI. All other systems reviewed and negative. PHYSICAL EXAM:    /73   Pulse 72   Temp 98.4 °F (36.9 °C) (Oral)   Resp 18   Ht 5' 7.5\" (1.715 m)   Wt 240 lb (108.9 kg)   SpO2 94%   BMI 37.03 kg/m²     General appearance:  No apparent distress, appears stated age and cooperative. HEENT:  Normal cephalic, atraumatic without obvious deformity. Pupils equal, round, and reactive to light. Extra ocular muscles intact. Conjunctivae/corneas clear. Neck: Supple, with full range of motion. No jugular venous distention. Trachea midline. Respiratory:  Normal respiratory effort. Clear to auscultation, bilaterally without Rales/Wheezes/Rhonchi. Cardiovascular:  Regular rate and rhythm with normal S1/S2 without murmurs, rubs or gallops. Abdomen: Soft, non-tender, non-distended with normal bowel sounds. Musculoskeletal:  No clubbing, cyanosis or edema bilaterally. Full range of motion without deformity.   Skin: Skin color, texture, turgor normal.  No rashes or lesions. Neurologic:  Neurovascularly intact without any focal sensory/motor deficits. Cranial nerves: II-XII intact, grossly non-focal.  Psychiatric:  Alert and oriented, thought content appropriate, normal insight  Capillary Refill: Brisk,< 3 seconds   Peripheral Pulses: +2 palpable, equal bilaterally       Labs:     Recent Labs     04/23/21  1200   WBC 5.2   HGB 13.8   HCT 42.9        Recent Labs     04/23/21  1200      K 4.1   CL 99   CO2 22*   BUN 19   CREATININE 0.6   CALCIUM 8.6     Recent Labs     04/23/21  1200   AST 30   ALT 19   BILITOT 0.4   ALKPHOS 50     No results for input(s): INR in the last 72 hours. No results for input(s): Konrad Cobble in the last 72 hours. Urinalysis:      Lab Results   Component Value Date    NITRU NEGATIVE 03/06/2014    BLOODU NEGATIVE 03/06/2014    GLUCOSEU NEGATIVE 03/06/2014       Intake & Output:  No intake/output data recorded. No intake/output data recorded. Radiology:     CXR: I have reviewed the CXR with the following interpretation: see below     XR CHEST PORTABLE   Final Result   Bilateral patchy opacities as evidence for viral pneumonia. **This report has been created using voice recognition software. It may contain minor errors which are inherent in voice recognition technology. **      Final report electronically signed by Dr. Facundo Gaytan MD on 4/23/2021 12:35 PM            ASSESSMENT:    C/Obey Arred[address] Problems    Diagnosis Date Noted    COVID-19 [U07.1] 04/23/2021    [facility] [U07.1, J96.00] 04/23/2021       Assessment/Plan:    --Acute Hypoxic Resp. Failure 2/2 COVID-19 PNA: symptoms are > 7 days ago. Presented 85% on RA. + COVID-19 4/17. CXR showing B/L patchy opacities. Pending Procal and D-Dimer. PO Decadron 6 mg daily, Vit C, D, Zinc, Lovenox BID, Acapella and IS. Will hold off on ABx for now given low suspicion.

## 2021-04-23 NOTE — ED NOTES
ED to inpatient nurses report    Chief Complaint   Patient presents with    Shortness of Breath    Positive For Covid-19      Present to ED from home  LOC: alert and orientated to name, place, date  Vital signs   Vitals:    04/23/21 1132 04/23/21 1136 04/23/21 1221 04/23/21 1313   BP: 117/78  123/81 103/73   Pulse: 75  74 72   Resp: 18  17 18   Temp: 98.4 °F (36.9 °C)      TempSrc: Oral      SpO2: (!) 85% 94% 95% 94%   Weight: 240 lb (108.9 kg)      Height: 5' 7.5\" (1.715 m)         Oxygen Baseline room air    Current needs required 3L NC Bipap/Cpap No  LDAs:    Mobility: Independent  Pending ED orders: none  Present condition: stable, worsening SOB with exertion      Electronically signed by Nasreen Gastelum RN on 4/23/2021 at 1:14 PM     Nasreen Gastelum [facility]  04/23/21 0802

## 2021-04-23 NOTE — PROGRESS NOTES
Patient admitted into 3A07. A&Ox4, INT left forearm, on 3 liters nasal cannula. Patient is short of breath at rest.      Skin assessment performed, no skin issues noted at this time. Patient oriented to room.

## 2021-04-23 NOTE — ED NOTES
Pt resting in bed, remains on 3L NC at this time. Call light within reach.      Annita Alfonso RN  04/23/21 2017

## 2021-04-23 NOTE — ED NOTES
Patient presents to the ED with complaints of feeling short of breath since yesterday. States she tested positive for COVID on Tuesday but was swabbed Saturday. O2 noted to be at 85% on RA. Placed patient on 2L via nasal cannula which brought O2 to 92%.      Luna Lias  04/23/21 4944

## 2021-04-24 ENCOUNTER — APPOINTMENT (OUTPATIENT)
Dept: CT IMAGING | Age: 56
DRG: 177 | End: 2021-04-24
Payer: COMMERCIAL

## 2021-04-24 LAB
ANION GAP SERPL CALCULATED.3IONS-SCNC: 12 MEQ/L (ref 8–16)
BASOPHILS # BLD: 0 %
BASOPHILS ABSOLUTE: 0 THOU/MM3 (ref 0–0.1)
BUN BLDV-MCNC: 19 MG/DL (ref 7–22)
CALCIUM SERPL-MCNC: 8.7 MG/DL (ref 8.5–10.5)
CHLORIDE BLD-SCNC: 101 MEQ/L (ref 98–111)
CO2: 23 MEQ/L (ref 23–33)
CREAT SERPL-MCNC: 0.5 MG/DL (ref 0.4–1.2)
EOSINOPHIL # BLD: 0 %
EOSINOPHILS ABSOLUTE: 0 THOU/MM3 (ref 0–0.4)
ERYTHROCYTE [DISTWIDTH] IN BLOOD BY AUTOMATED COUNT: 15.3 % (ref 11.5–14.5)
ERYTHROCYTE [DISTWIDTH] IN BLOOD BY AUTOMATED COUNT: 45.9 FL (ref 35–45)
GFR SERPL CREATININE-BSD FRML MDRD: > 90 ML/MIN/1.73M2
GLUCOSE BLD-MCNC: 196 MG/DL (ref 70–108)
HCT VFR BLD CALC: 40.9 % (ref 37–47)
HEMOGLOBIN: 13.1 GM/DL (ref 12–16)
IMMATURE GRANS (ABS): 0.03 THOU/MM3 (ref 0–0.07)
IMMATURE GRANULOCYTES: 0.7 %
LYMPHOCYTES # BLD: 11.8 %
LYMPHOCYTES ABSOLUTE: 0.5 THOU/MM3 (ref 1–4.8)
MAGNESIUM: 2.5 MG/DL (ref 1.6–2.4)
MCH RBC QN AUTO: 26.5 PG (ref 26–33)
MCHC RBC AUTO-ENTMCNC: 32 GM/DL (ref 32.2–35.5)
MCV RBC AUTO: 82.8 FL (ref 81–99)
MONOCYTES # BLD: 5.2 %
MONOCYTES ABSOLUTE: 0.2 THOU/MM3 (ref 0.4–1.3)
NUCLEATED RED BLOOD CELLS: 0 /100 WBC
PLATELET # BLD: 236 THOU/MM3 (ref 130–400)
PMV BLD AUTO: 10.7 FL (ref 9.4–12.4)
POTASSIUM SERPL-SCNC: 4.6 MEQ/L (ref 3.5–5.2)
RBC # BLD: 4.94 MILL/MM3 (ref 4.2–5.4)
SEG NEUTROPHILS: 82.3 %
SEGMENTED NEUTROPHILS ABSOLUTE COUNT: 3.4 THOU/MM3 (ref 1.8–7.7)
SODIUM BLD-SCNC: 136 MEQ/L (ref 135–145)
WBC # BLD: 4.1 THOU/MM3 (ref 4.8–10.8)

## 2021-04-24 PROCEDURE — 94761 N-INVAS EAR/PLS OXIMETRY MLT: CPT

## 2021-04-24 PROCEDURE — 94669 MECHANICAL CHEST WALL OSCILL: CPT

## 2021-04-24 PROCEDURE — 6360000002 HC RX W HCPCS: Performed by: INTERNAL MEDICINE

## 2021-04-24 PROCEDURE — 71275 CT ANGIOGRAPHY CHEST: CPT

## 2021-04-24 PROCEDURE — 83735 ASSAY OF MAGNESIUM: CPT

## 2021-04-24 PROCEDURE — 6370000000 HC RX 637 (ALT 250 FOR IP): Performed by: INTERNAL MEDICINE

## 2021-04-24 PROCEDURE — 85025 COMPLETE CBC W/AUTO DIFF WBC: CPT

## 2021-04-24 PROCEDURE — XW033E5 INTRODUCTION OF REMDESIVIR ANTI-INFECTIVE INTO PERIPHERAL VEIN, PERCUTANEOUS APPROACH, NEW TECHNOLOGY GROUP 5: ICD-10-PCS | Performed by: INTERNAL MEDICINE

## 2021-04-24 PROCEDURE — 99233 SBSQ HOSP IP/OBS HIGH 50: CPT | Performed by: INTERNAL MEDICINE

## 2021-04-24 PROCEDURE — 80048 BASIC METABOLIC PNL TOTAL CA: CPT

## 2021-04-24 PROCEDURE — 36415 COLL VENOUS BLD VENIPUNCTURE: CPT

## 2021-04-24 PROCEDURE — 6360000004 HC RX CONTRAST MEDICATION: Performed by: INTERNAL MEDICINE

## 2021-04-24 PROCEDURE — 2500000003 HC RX 250 WO HCPCS: Performed by: INTERNAL MEDICINE

## 2021-04-24 PROCEDURE — 2580000003 HC RX 258: Performed by: INTERNAL MEDICINE

## 2021-04-24 PROCEDURE — 1200000003 HC TELEMETRY R&B

## 2021-04-24 RX ADMIN — OXYCODONE HYDROCHLORIDE AND ACETAMINOPHEN 1000 MG: 500 TABLET ORAL at 21:00

## 2021-04-24 RX ADMIN — MULTIPLE VITAMINS W/ MINERALS TAB 1 TABLET: TAB at 21:00

## 2021-04-24 RX ADMIN — ATORVASTATIN CALCIUM 10 MG: 10 TABLET, FILM COATED ORAL at 21:00

## 2021-04-24 RX ADMIN — Medication: at 08:02

## 2021-04-24 RX ADMIN — PANTOPRAZOLE SODIUM 40 MG: 40 TABLET, DELAYED RELEASE ORAL at 06:15

## 2021-04-24 RX ADMIN — MULTIPLE VITAMINS W/ MINERALS TAB 1 TABLET: TAB at 08:03

## 2021-04-24 RX ADMIN — ESCITALOPRAM 20 MG: 20 TABLET, FILM COATED ORAL at 08:03

## 2021-04-24 RX ADMIN — IOPAMIDOL 80 ML: 755 INJECTION, SOLUTION INTRAVENOUS at 12:55

## 2021-04-24 RX ADMIN — ENOXAPARIN SODIUM 40 MG: 40 INJECTION SUBCUTANEOUS at 15:08

## 2021-04-24 RX ADMIN — ENOXAPARIN SODIUM 40 MG: 40 INJECTION SUBCUTANEOUS at 04:11

## 2021-04-24 RX ADMIN — Medication 45 MG: at 08:03

## 2021-04-24 RX ADMIN — ASPIRIN 81 MG: 81 TABLET, COATED ORAL at 08:03

## 2021-04-24 RX ADMIN — DEXAMETHASONE 6 MG: 4 TABLET ORAL at 08:03

## 2021-04-24 RX ADMIN — REMDESIVIR 200 MG: 100 INJECTION, POWDER, LYOPHILIZED, FOR SOLUTION INTRAVENOUS at 13:33

## 2021-04-24 RX ADMIN — Medication 1000 UNITS: at 21:00

## 2021-04-24 RX ADMIN — SODIUM CHLORIDE, PRESERVATIVE FREE 10 ML: 5 INJECTION INTRAVENOUS at 09:50

## 2021-04-24 RX ADMIN — SODIUM CHLORIDE, PRESERVATIVE FREE 10 ML: 5 INJECTION INTRAVENOUS at 21:00

## 2021-04-24 ASSESSMENT — PAIN SCALES - GENERAL: PAINLEVEL_OUTOF10: 0

## 2021-04-24 NOTE — PLAN OF CARE
Problem: Gas Exchange - Impaired  Goal: Absence of hypoxia  Note: Patient exhibiting no S/S of hypoxia. Maintaining oxygen levels above 90% on 5L NC. Will continue to monitor. Problem: Breathing Pattern - Ineffective  Goal: Ability to achieve and maintain a regular respiratory rate  Note: Patient with regular respirations. No S/S of breathing difficulty. Will continue to monitor. Problem: Falls - Risk of:  Goal: Will remain free from falls  Description: Will remain free from falls  Note: Patient safety maintained. No falls or injuries to this point in the shift. Will continue to monitor.

## 2021-04-24 NOTE — PROGRESS NOTES
Patient's aunt Wilver Akers called for an update. She provided the patient's access code. Update provided.

## 2021-04-24 NOTE — PROGRESS NOTES
 aspirin  81 mg Oral Daily    pantoprazole  40 mg Oral QAM AC    atorvastatin  10 mg Oral Daily    therapeutic multivitamin-minerals  1 tablet Oral BID    vitamin C  1,000 mg Oral Daily    vitamin E  1,000 Units Oral Daily    dexamethasone  6 mg Oral Daily    zinc sulfate  50 mg Oral Daily    Vitamin D  1,000 Units Oral Daily    sodium chloride flush  5-40 mL Intravenous 2 times per day    enoxaparin  40 mg Subcutaneous Q12H    escitalopram  20 mg Oral Daily     PRN Meds: LORazepam, sodium chloride flush, sodium chloride, promethazine **OR** ondansetron, polyethylene glycol, acetaminophen **OR** acetaminophen      Intake/Output Summary (Last 24 hours) at 4/24/2021 1037  Last data filed at 4/23/2021 2003  Gross per 24 hour   Intake --   Output 200 ml   Net -200 ml       Diet:  DIET GENERAL;    Exam:  /75   Pulse 66   Temp 98.8 °F (37.1 °C) (Oral)   Resp 12   Ht 5' 7.5\" (1.715 m)   Wt 244 lb (110.7 kg)   SpO2 91%   BMI 37.65 kg/m²     General appearance: No apparent distress, appears stated age and cooperative on 5L NC.   HEENT: Pupils equal, round, and reactive to light. Conjunctivae/corneas clear. Neck: Supple, with full range of motion. No jugular venous distention. Trachea midline. Respiratory:  Normal respiratory effort. Clear to auscultation, bilaterally without Rales/Wheezes/Rhonchi. Cardiovascular: Regular rate and rhythm with normal S1/S2 without murmurs, rubs or gallops. Abdomen: Soft, non-tender, non-distended with normal bowel sounds. Musculoskeletal: passive and active ROM x 4 extremities. Skin: Skin color, texture, turgor normal.  No rashes or lesions. Neurologic:  Neurovascularly intact without any focal sensory/motor deficits.  Cranial nerves: II-XII intact, grossly non-focal.  Psychiatric: Alert and oriented, thought content appropriate, normal insight  Capillary Refill: Brisk,< 3 seconds   Peripheral Pulses: +2 palpable, equal bilaterally       Labs:   Recent Labs 04/24/21  0448   WBC 4.1*   HGB 13.1   HCT 40.9        Recent Labs     04/24/21  0448      K 4.6      CO2 23   BUN 19   CREATININE 0.5   CALCIUM 8.7     Recent Labs     04/23/21  1200   AST 30   ALT 19   BILITOT 0.4   ALKPHOS 50     No results for input(s): INR in the last 72 hours. No results for input(s): Carlyle Journey in the last 72 hours. Urinalysis:      Lab Results   Component Value Date    NITRU NEGATIVE 03/06/2014    BLOODU NEGATIVE 03/06/2014    GLUCOSEU NEGATIVE 03/06/2014       Radiology:   All imaging reviewed     Diet: DIET GENERAL;      Code Status: Full Code            Electronically signed by Mone Babb MD on 4/24/2021 at 10:37 AM

## 2021-04-25 LAB
ALBUMIN SERPL-MCNC: 3.4 G/DL (ref 3.5–5.1)
ALP BLD-CCNC: 48 U/L (ref 38–126)
ALT SERPL-CCNC: 21 U/L (ref 11–66)
ANION GAP SERPL CALCULATED.3IONS-SCNC: 10 MEQ/L (ref 8–16)
AST SERPL-CCNC: 21 U/L (ref 5–40)
BASOPHILS # BLD: 0.2 %
BASOPHILS ABSOLUTE: 0 THOU/MM3 (ref 0–0.1)
BILIRUB SERPL-MCNC: 0.3 MG/DL (ref 0.3–1.2)
BILIRUBIN DIRECT: < 0.2 MG/DL (ref 0–0.3)
BUN BLDV-MCNC: 18 MG/DL (ref 7–22)
CALCIUM SERPL-MCNC: 9.2 MG/DL (ref 8.5–10.5)
CHLORIDE BLD-SCNC: 104 MEQ/L (ref 98–111)
CO2: 26 MEQ/L (ref 23–33)
CREAT SERPL-MCNC: 0.6 MG/DL (ref 0.4–1.2)
D-DIMER QUANTITATIVE: 617 NG/ML FEU (ref 0–500)
EOSINOPHIL # BLD: 0 %
EOSINOPHILS ABSOLUTE: 0 THOU/MM3 (ref 0–0.4)
ERYTHROCYTE [DISTWIDTH] IN BLOOD BY AUTOMATED COUNT: 14.9 % (ref 11.5–14.5)
ERYTHROCYTE [DISTWIDTH] IN BLOOD BY AUTOMATED COUNT: 45.2 FL (ref 35–45)
FERRITIN: 548 NG/ML (ref 10–291)
GFR SERPL CREATININE-BSD FRML MDRD: > 90 ML/MIN/1.73M2
GLUCOSE BLD-MCNC: 167 MG/DL (ref 70–108)
GLUCOSE BLD-MCNC: 200 MG/DL (ref 70–108)
HCT VFR BLD CALC: 43.4 % (ref 37–47)
HEMOGLOBIN: 13.7 GM/DL (ref 12–16)
IMMATURE GRANS (ABS): 0.05 THOU/MM3 (ref 0–0.07)
IMMATURE GRANULOCYTES: 1.1 %
LYMPHOCYTES # BLD: 15.8 %
LYMPHOCYTES ABSOLUTE: 0.7 THOU/MM3 (ref 1–4.8)
MAGNESIUM: 2.5 MG/DL (ref 1.6–2.4)
MCH RBC QN AUTO: 26.3 PG (ref 26–33)
MCHC RBC AUTO-ENTMCNC: 31.6 GM/DL (ref 32.2–35.5)
MCV RBC AUTO: 83.3 FL (ref 81–99)
MONOCYTES # BLD: 7.5 %
MONOCYTES ABSOLUTE: 0.3 THOU/MM3 (ref 0.4–1.3)
NUCLEATED RED BLOOD CELLS: 0 /100 WBC
PLATELET # BLD: 256 THOU/MM3 (ref 130–400)
PMV BLD AUTO: 9.6 FL (ref 9.4–12.4)
POTASSIUM SERPL-SCNC: 4.6 MEQ/L (ref 3.5–5.2)
PROCALCITONIN: 0.04 NG/ML (ref 0.01–0.09)
RBC # BLD: 5.21 MILL/MM3 (ref 4.2–5.4)
SEG NEUTROPHILS: 75.4 %
SEGMENTED NEUTROPHILS ABSOLUTE COUNT: 3.5 THOU/MM3 (ref 1.8–7.7)
SODIUM BLD-SCNC: 140 MEQ/L (ref 135–145)
TOTAL PROTEIN: 6.8 G/DL (ref 6.1–8)
WBC # BLD: 4.6 THOU/MM3 (ref 4.8–10.8)

## 2021-04-25 PROCEDURE — 85025 COMPLETE CBC W/AUTO DIFF WBC: CPT

## 2021-04-25 PROCEDURE — 83735 ASSAY OF MAGNESIUM: CPT

## 2021-04-25 PROCEDURE — 82728 ASSAY OF FERRITIN: CPT

## 2021-04-25 PROCEDURE — 36415 COLL VENOUS BLD VENIPUNCTURE: CPT

## 2021-04-25 PROCEDURE — 6370000000 HC RX 637 (ALT 250 FOR IP): Performed by: INTERNAL MEDICINE

## 2021-04-25 PROCEDURE — 2500000003 HC RX 250 WO HCPCS: Performed by: INTERNAL MEDICINE

## 2021-04-25 PROCEDURE — 1200000003 HC TELEMETRY R&B

## 2021-04-25 PROCEDURE — 99233 SBSQ HOSP IP/OBS HIGH 50: CPT | Performed by: INTERNAL MEDICINE

## 2021-04-25 PROCEDURE — 82248 BILIRUBIN DIRECT: CPT

## 2021-04-25 PROCEDURE — 82948 REAGENT STRIP/BLOOD GLUCOSE: CPT

## 2021-04-25 PROCEDURE — 2580000003 HC RX 258: Performed by: INTERNAL MEDICINE

## 2021-04-25 PROCEDURE — 6360000002 HC RX W HCPCS: Performed by: INTERNAL MEDICINE

## 2021-04-25 PROCEDURE — 84145 PROCALCITONIN (PCT): CPT

## 2021-04-25 PROCEDURE — 85379 FIBRIN DEGRADATION QUANT: CPT

## 2021-04-25 PROCEDURE — 80053 COMPREHEN METABOLIC PANEL: CPT

## 2021-04-25 RX ADMIN — ENOXAPARIN SODIUM 40 MG: 40 INJECTION SUBCUTANEOUS at 04:28

## 2021-04-25 RX ADMIN — MULTIPLE VITAMINS W/ MINERALS TAB 1 TABLET: TAB at 09:06

## 2021-04-25 RX ADMIN — ENOXAPARIN SODIUM 40 MG: 40 INJECTION SUBCUTANEOUS at 16:26

## 2021-04-25 RX ADMIN — Medication 1000 UNITS: at 09:06

## 2021-04-25 RX ADMIN — ESCITALOPRAM 20 MG: 20 TABLET, FILM COATED ORAL at 09:06

## 2021-04-25 RX ADMIN — SODIUM CHLORIDE, PRESERVATIVE FREE 10 ML: 5 INJECTION INTRAVENOUS at 09:07

## 2021-04-25 RX ADMIN — Medication 1000 UNITS: at 20:26

## 2021-04-25 RX ADMIN — SODIUM CHLORIDE, PRESERVATIVE FREE 10 ML: 5 INJECTION INTRAVENOUS at 20:26

## 2021-04-25 RX ADMIN — OXYCODONE HYDROCHLORIDE AND ACETAMINOPHEN 1000 MG: 500 TABLET ORAL at 20:26

## 2021-04-25 RX ADMIN — ATORVASTATIN CALCIUM 10 MG: 10 TABLET, FILM COATED ORAL at 20:26

## 2021-04-25 RX ADMIN — Medication 50 MG: at 09:07

## 2021-04-25 RX ADMIN — PANTOPRAZOLE SODIUM 40 MG: 40 TABLET, DELAYED RELEASE ORAL at 04:29

## 2021-04-25 RX ADMIN — ASPIRIN 81 MG: 81 TABLET, COATED ORAL at 09:06

## 2021-04-25 RX ADMIN — REMDESIVIR 100 MG: 100 INJECTION, POWDER, LYOPHILIZED, FOR SOLUTION INTRAVENOUS at 12:32

## 2021-04-25 RX ADMIN — MULTIPLE VITAMINS W/ MINERALS TAB 1 TABLET: TAB at 20:26

## 2021-04-25 RX ADMIN — DEXAMETHASONE 6 MG: 4 TABLET ORAL at 09:06

## 2021-04-25 ASSESSMENT — PAIN SCALES - GENERAL
PAINLEVEL_OUTOF10: 0
PAINLEVEL_OUTOF10: 0

## 2021-04-25 NOTE — PLAN OF CARE
Problem: Airway Clearance - Ineffective  Goal: Achieve or maintain patent airway  Outcome: Ongoing  Note: Pt maintains patent airway     Problem: Gas Exchange - Impaired  Goal: Absence of hypoxia  4/25/2021 0034 by Nancy García RN  Outcome: Ongoing  Note: Pt maintaining SpO2 >90% on current O2. Cont to monitor and adjust as needed   4/24/2021 1649 by Vy Schmitt RN  Note: Patient exhibiting no S/S of hypoxia. Maintaining oxygen levels above 90% on 5L NC. Will continue to monitor. Goal: Promote optimal lung function  Outcome: Ongoing  Note: Pt maintains adequate lung function     Problem: Breathing Pattern - Ineffective  Goal: Ability to achieve and maintain a regular respiratory rate  4/25/2021 0034 by Nancy García RN  Outcome: Ongoing  Note: Pt maintains adequate resp rate  4/24/2021 1649 by Vy Schmitt RN  Note: Patient with regular respirations. No S/S of breathing difficulty. Will continue to monitor. Problem:  Body Temperature -  Risk of, Imbalanced  Goal: Ability to maintain a body temperature within defined limits  Outcome: Ongoing  Note: Pt maintains a temp WNL  Goal: Will regain or maintain usual level of consciousness  Outcome: Ongoing  Note: Pt A&O x4  Goal: Complications related to the disease process, condition or treatment will be avoided or minimized  Outcome: Ongoing     Problem: Isolation Precautions - Risk of Spread of Infection  Goal: Prevent transmission of infection  Outcome: Ongoing  Note: Cont proper PPE, handwashing     Problem: Nutrition Deficits  Goal: Optimize nutritional status  Outcome: Ongoing  Note: PO intake poor, encourage more PO intake     Problem: Risk for Fluid Volume Deficit  Goal: Maintain normal heart rhythm  Outcome: Ongoing  Note: Pt remains NSR/SB  Goal: Maintain absence of muscle cramping  Outcome: Ongoing  Note: Pt denies any cramping  Goal: Maintain normal serum potassium, sodium, calcium, phosphorus, and pH  Outcome: Ongoing  Note: Elytes WNL, cont to kj and replace as needed     Problem: Loneliness or Risk for Loneliness  Goal: Demonstrate positive use of time alone when socialization is not possible  Outcome: Ongoing  Note: Pt able express needs     Problem: Fatigue  Goal: Verbalize increase energy and improved vitality  Outcome: Ongoing  Note: Pt states energy is improving      Problem: Patient Education: Go to Patient Education Activity  Goal: Patient/Family Education  Outcome: Ongoing  Note: Pt educated on POC, all questions answered appropriately      Problem: Falls - Risk of:  Goal: Will remain free from falls  Description: Will remain free from falls  4/25/2021 0034 by Filipe Polk RN  Outcome: Ongoing  Note: Pt free from falls, safety maintained   4/24/2021 1649 by Vipul Abraham RN  Note: Patient safety maintained. No falls or injuries to this point in the shift. Will continue to monitor.    Goal: Absence of physical injury  Description: Absence of physical injury  Outcome: Ongoing  Note: Pt free from injury, safety maintained

## 2021-04-25 NOTE — PROGRESS NOTES
Hospitalist Progress Note    Patient:  Berta Bar      Unit/Bed:3A-07/007-A    YOB: 1965    MRN: [id number]       Acct: [de-identified]     PCP: Samuel Soriano MD    Date of Admission: 4/23/2021    Active Hospital Problems    Diagnosis Date Noted    COVID-19 [U07.1] 04/23/2021    Acute respiratory failure due to COVID-19 [facility]) [U07.1, J96.00] 04/23/2021     Assessment/Plan:     1. Acute Hypoxic Resp. Failure 2/2 COVID-19 PNA: symptoms are > 7 days ago. Presented 85% on RA. + COVID-19 4/17. CXR showing B/L patchy opacities. Pending Procal and D-Dimer. PO Decadron 6 mg daily, Vit C, D, Zinc, Lovenox BID, Acapella and IS. Will hold off on ABx for now given low suspicion. --4/24: requiring more O2, up to 5L NC today. D-Dimer elevated. Pending CTA chest. Will start IV Remdisivir today. Cont PO Decadron 6 mg daily (Day 2), Vit C, D, Zinc, Lovenox BID, Acapella and IS. Procal WNL, no need for ABx.   --4/25: requiring up to 6L NC today, CTA chest negative PE. Will order Tocluzumab today. Cont IV Remdisivir (Day 2), PO Decadron 6 mg (Day 3), Vit C, D, Zinc, Lovenox BID, Acapella and IS.            Chief Complaint: Fevers, chills, cough, sob, diarrhea, muscle aches, weakness, fatigue     Hospital Course: 48 y.o. female who presented to [facility] with complaints of being diagnosed with COVID-19 on 4/17/21. Pt her brother recently had COVID who she thinks she got it from. Did not get vaccinated. Reports to worsening symptoms of daily fevers, chills, cough, sob, diarrhea, muscle aches, weakness, fatigue. Reports her sob started to get much worse few days ago coming to the ED. Pt reports having had a virtual visit with her PCP who gave her Decadron, of which she took 4 pills. Pt has not been eating well.      In the ED, pt hypoxic 85% on RA. Placed on 3L NC. Labs unremarkable. CXR showing B/L patchy opacities. Subjective: no acute events overnight.  Not having any fevers or chills today. Eating slightly better. Still sob and coughing. No abdominal pain or diarrhea. No chest pain. Medications:  Reviewed    Infusion Medications    sodium chloride       Scheduled Medications    tocilizumab (ACTEMRA) IVPB  800 mg Intravenous Once    remdesivir IVPB  100 mg Intravenous Q24H    aspirin  81 mg Oral Daily    pantoprazole  40 mg Oral QAM AC    atorvastatin  10 mg Oral Daily    therapeutic multivitamin-minerals  1 tablet Oral BID    vitamin C  1,000 mg Oral Daily    vitamin E  1,000 Units Oral Daily    dexamethasone  6 mg Oral Daily    zinc sulfate  50 mg Oral Daily    Vitamin D  1,000 Units Oral Daily    sodium chloride flush  5-40 mL Intravenous 2 times per day    enoxaparin  40 mg Subcutaneous Q12H    escitalopram  20 mg Oral Daily     PRN Meds: LORazepam, sodium chloride flush, sodium chloride, promethazine **OR** ondansetron, polyethylene glycol, acetaminophen **OR** acetaminophen      Intake/Output Summary (Last 24 hours) at 4/25/2021 1142  Last data filed at 4/25/2021 0431  Gross per 24 hour   Intake 560 ml   Output 200 ml   Net 360 ml       Diet:  DIET GENERAL;    Exam:  /67   Pulse 65   Temp 98.1 °F (36.7 °C) (Oral)   Resp 14   Ht 5' 7.5\" (1.715 m)   Wt 243 lb (110.2 kg)   SpO2 92%   BMI 37.50 kg/m²     General appearance: No apparent distress, appears stated age and cooperative on 6L NC.   HEENT: Pupils equal, round, and reactive to light. Conjunctivae/corneas clear. Neck: Supple, with full range of motion. No jugular venous distention. Trachea midline. Respiratory:  Normal respiratory effort. Clear to auscultation, bilaterally without Rales/Wheezes/Rhonchi. Cardiovascular: Regular rate and rhythm with normal S1/S2 without murmurs, rubs or gallops. Abdomen: Soft, non-tender, non-distended with normal bowel sounds. Musculoskeletal: passive and active ROM x 4 extremities. Skin: Skin color, texture, turgor normal.  No rashes or lesions.   Neurologic: Neurovascularly intact without any focal sensory/motor deficits. Cranial nerves: II-XII intact, grossly non-focal.  Psychiatric: Alert and oriented, thought content appropriate, normal insight  Capillary Refill: Brisk,< 3 seconds   Peripheral Pulses: +2 palpable, equal bilaterally       Labs:   Recent Labs     04/25/21  0350   WBC 4.6*   HGB 13.7   HCT 43.4        Recent Labs     04/25/21  0350      K 4.6      CO2 26   BUN 18   CREATININE 0.6   CALCIUM 9.2     Recent Labs     04/25/21  0350   AST 21   ALT 21   BILIDIR <0.2   BILITOT 0.3   ALKPHOS 48     No results for input(s): INR in the last 72 hours. No results for input(s): Birder Hof in the last 72 hours. Urinalysis:      Lab Results   Component Value Date    NITRU NEGATIVE 03/06/2014    BLOODU NEGATIVE 03/06/2014    GLUCOSEU NEGATIVE 03/06/2014       Radiology:   All imaging reviewed     Diet: DIET GENERAL;      Code Status: Full Code            Electronically signed by Luma Oneal MD on 4/25/2021 at 11:42 AM

## 2021-04-25 NOTE — PLAN OF CARE
Problem: Gas Exchange - Impaired  Goal: Absence of hypoxia  Outcome: Ongoing  Note: Patient requires 6L nasal cannula to maintain O2 saturation >90%. Problem: Body Temperature -  Risk of, Imbalanced  Goal: Ability to maintain a body temperature within defined limits  Outcome: Ongoing  Note: Temperature has been WNL. Problem: Isolation Precautions - Risk of Spread of Infection  Goal: Prevent transmission of infection  Outcome: Ongoing  Note: Droplet+ isolation precautions in place. Problem: Nutrition Deficits  Goal: Optimize nutritional status  Outcome: Ongoing  Note: Patient eating <25 of meals. Dietary was consulted and ensures will be delivered with meals per patient request.     Problem: Risk for Fluid Volume Deficit  Goal: Maintain normal heart rhythm  Outcome: Ongoing  Note: Patient is in sinus bradycardia and electrolytes are WNL. Problem: Fatigue  Goal: Verbalize increase energy and improved vitality  Outcome: Ongoing  Note: Patient continues to feel quite weak and tire. Problem: Falls - Risk of:  Goal: Will remain free from falls  Description: Will remain free from falls  Outcome: Ongoing  Note: No fall this shift. Bed alarm on, Falling star program in place. Call light within reach. Care plan reviewed with patient and Jose Barragan (daughter). Patient and Jose Lazoer verbalize understanding of the plan of care and contribute to goal setting.

## 2021-04-26 LAB
ALBUMIN SERPL-MCNC: 3.6 G/DL (ref 3.5–5.1)
ALP BLD-CCNC: 46 U/L (ref 38–126)
ALT SERPL-CCNC: 21 U/L (ref 11–66)
ANION GAP SERPL CALCULATED.3IONS-SCNC: 10 MEQ/L (ref 8–16)
AST SERPL-CCNC: 18 U/L (ref 5–40)
BASOPHILS # BLD: 0.2 %
BASOPHILS ABSOLUTE: 0 THOU/MM3 (ref 0–0.1)
BILIRUB SERPL-MCNC: 0.3 MG/DL (ref 0.3–1.2)
BILIRUBIN DIRECT: < 0.2 MG/DL (ref 0–0.3)
BUN BLDV-MCNC: 21 MG/DL (ref 7–22)
CALCIUM SERPL-MCNC: 9.2 MG/DL (ref 8.5–10.5)
CHLORIDE BLD-SCNC: 102 MEQ/L (ref 98–111)
CO2: 28 MEQ/L (ref 23–33)
CREAT SERPL-MCNC: 0.7 MG/DL (ref 0.4–1.2)
D-DIMER QUANTITATIVE: 714 NG/ML FEU (ref 0–500)
EOSINOPHIL # BLD: 0 %
EOSINOPHILS ABSOLUTE: 0 THOU/MM3 (ref 0–0.4)
ERYTHROCYTE [DISTWIDTH] IN BLOOD BY AUTOMATED COUNT: 14.7 % (ref 11.5–14.5)
ERYTHROCYTE [DISTWIDTH] IN BLOOD BY AUTOMATED COUNT: 45.1 FL (ref 35–45)
FERRITIN: 513 NG/ML (ref 10–291)
GFR SERPL CREATININE-BSD FRML MDRD: 87 ML/MIN/1.73M2
GLUCOSE BLD-MCNC: 151 MG/DL (ref 70–108)
HCT VFR BLD CALC: 44.3 % (ref 37–47)
HEMOGLOBIN: 13.7 GM/DL (ref 12–16)
IMMATURE GRANS (ABS): 0.08 THOU/MM3 (ref 0–0.07)
IMMATURE GRANULOCYTES: 1.6 %
LYMPHOCYTES # BLD: 14.7 %
LYMPHOCYTES ABSOLUTE: 0.7 THOU/MM3 (ref 1–4.8)
MAGNESIUM: 2.4 MG/DL (ref 1.6–2.4)
MCH RBC QN AUTO: 26 PG (ref 26–33)
MCHC RBC AUTO-ENTMCNC: 30.9 GM/DL (ref 32.2–35.5)
MCV RBC AUTO: 84.1 FL (ref 81–99)
MONOCYTES # BLD: 8 %
MONOCYTES ABSOLUTE: 0.4 THOU/MM3 (ref 0.4–1.3)
NUCLEATED RED BLOOD CELLS: 0 /100 WBC
PLATELET # BLD: 295 THOU/MM3 (ref 130–400)
PMV BLD AUTO: 9.8 FL (ref 9.4–12.4)
POTASSIUM SERPL-SCNC: 4.9 MEQ/L (ref 3.5–5.2)
PROCALCITONIN: 0.06 NG/ML (ref 0.01–0.09)
RBC # BLD: 5.27 MILL/MM3 (ref 4.2–5.4)
SEG NEUTROPHILS: 75.5 %
SEGMENTED NEUTROPHILS ABSOLUTE COUNT: 3.9 THOU/MM3 (ref 1.8–7.7)
SODIUM BLD-SCNC: 140 MEQ/L (ref 135–145)
TOTAL PROTEIN: 6.8 G/DL (ref 6.1–8)
WBC # BLD: 5.1 THOU/MM3 (ref 4.8–10.8)

## 2021-04-26 PROCEDURE — 82728 ASSAY OF FERRITIN: CPT

## 2021-04-26 PROCEDURE — 80053 COMPREHEN METABOLIC PANEL: CPT

## 2021-04-26 PROCEDURE — 85025 COMPLETE CBC W/AUTO DIFF WBC: CPT

## 2021-04-26 PROCEDURE — 6370000000 HC RX 637 (ALT 250 FOR IP): Performed by: INTERNAL MEDICINE

## 2021-04-26 PROCEDURE — 83735 ASSAY OF MAGNESIUM: CPT

## 2021-04-26 PROCEDURE — 2580000003 HC RX 258: Performed by: INTERNAL MEDICINE

## 2021-04-26 PROCEDURE — 97162 PT EVAL MOD COMPLEX 30 MIN: CPT

## 2021-04-26 PROCEDURE — 6360000002 HC RX W HCPCS: Performed by: INTERNAL MEDICINE

## 2021-04-26 PROCEDURE — 97110 THERAPEUTIC EXERCISES: CPT

## 2021-04-26 PROCEDURE — 85379 FIBRIN DEGRADATION QUANT: CPT

## 2021-04-26 PROCEDURE — 84145 PROCALCITONIN (PCT): CPT

## 2021-04-26 PROCEDURE — 36415 COLL VENOUS BLD VENIPUNCTURE: CPT

## 2021-04-26 PROCEDURE — 82248 BILIRUBIN DIRECT: CPT

## 2021-04-26 PROCEDURE — 99232 SBSQ HOSP IP/OBS MODERATE 35: CPT | Performed by: INTERNAL MEDICINE

## 2021-04-26 PROCEDURE — 2500000003 HC RX 250 WO HCPCS: Performed by: INTERNAL MEDICINE

## 2021-04-26 PROCEDURE — 1200000003 HC TELEMETRY R&B

## 2021-04-26 RX ADMIN — Medication 50 MG: at 10:41

## 2021-04-26 RX ADMIN — Medication 1000 UNITS: at 10:43

## 2021-04-26 RX ADMIN — SODIUM CHLORIDE, PRESERVATIVE FREE 10 ML: 5 INJECTION INTRAVENOUS at 10:45

## 2021-04-26 RX ADMIN — DEXAMETHASONE 6 MG: 4 TABLET ORAL at 10:42

## 2021-04-26 RX ADMIN — REMDESIVIR 100 MG: 100 INJECTION, POWDER, LYOPHILIZED, FOR SOLUTION INTRAVENOUS at 13:00

## 2021-04-26 RX ADMIN — SODIUM CHLORIDE, PRESERVATIVE FREE 10 ML: 5 INJECTION INTRAVENOUS at 21:02

## 2021-04-26 RX ADMIN — MULTIPLE VITAMINS W/ MINERALS TAB 1 TABLET: TAB at 10:42

## 2021-04-26 RX ADMIN — ATORVASTATIN CALCIUM 10 MG: 10 TABLET, FILM COATED ORAL at 21:02

## 2021-04-26 RX ADMIN — PANTOPRAZOLE SODIUM 40 MG: 40 TABLET, DELAYED RELEASE ORAL at 06:26

## 2021-04-26 RX ADMIN — MULTIPLE VITAMINS W/ MINERALS TAB 1 TABLET: TAB at 21:02

## 2021-04-26 RX ADMIN — OXYCODONE HYDROCHLORIDE AND ACETAMINOPHEN 1000 MG: 500 TABLET ORAL at 21:02

## 2021-04-26 RX ADMIN — ENOXAPARIN SODIUM 40 MG: 40 INJECTION SUBCUTANEOUS at 03:53

## 2021-04-26 RX ADMIN — Medication 1000 UNITS: at 21:02

## 2021-04-26 RX ADMIN — ENOXAPARIN SODIUM 40 MG: 40 INJECTION SUBCUTANEOUS at 14:59

## 2021-04-26 RX ADMIN — ASPIRIN 81 MG: 81 TABLET, COATED ORAL at 10:41

## 2021-04-26 RX ADMIN — ESCITALOPRAM 20 MG: 20 TABLET, FILM COATED ORAL at 10:42

## 2021-04-26 ASSESSMENT — PAIN SCALES - GENERAL
PAINLEVEL_OUTOF10: 0

## 2021-04-26 NOTE — PROGRESS NOTES
Patient educated extensively on new medication Tocilizumab. Education printed and given to patient but patient doesn't want to receive medication.

## 2021-04-26 NOTE — PROGRESS NOTES
[address] [facility],[address] [facility] 3A - 3A-07/007-A    Time In: 4339  Time Out: 1408  Timed Code Treatment Minutes: 10 Minutes  Minutes: 21          Date: 2021  Patient Name: Carolyn Mcknight,  Gender:  female        MRN: [id number]  : 1965  (48 y.o.)  Referral Date : 21   Referring Practitioner: Cece Hutton MD  Diagnosis: COVID-19  Additional Pertinent Hx: Per H&P:55 y.o. female who presented to [facility] with complaints of being diagnosed with COVID-19 on 21. Pt her brother recently had COVID who she thinks she got it from. Did not get vaccinated. Reports to worsening symptoms of daily fevers, chills, cough, sob, diarrhea, muscle aches, weakness, fatigue. Reports her sob started to get much worse few days ago coming to the ED. Pt reports having had a virtual visit with her PCP who gave her Decadron, of which she took 4 pills. Pt has not been eating well. Restrictions/Precautions:  Restrictions/Precautions: Isolation, Fall Risk  Position Activity Restriction  Other position/activity restrictions: 5L O2     Subjective:  Chart Reviewed: Yes  Patient assessed for rehabilitation services?: Yes  Family / Caregiver Present: No  Subjective: Patient in room in bed, agreeable to PT. RN okay with PT session. Pt cooperative and pleasant.     General:  Overall Orientation Status: Within Functional Limits    Vision: Impaired  Vision Exceptions: Wears glasses at all times    Hearing: Within functional limits         Pain: no pain except with deep breaths at times    Vitals: Oxygen: pt on 5L, O2 88-94% throughout, verbal cues for pursed lip breathing with rest breaks with O2 into 80's with exercise    Social/Functional History:    Lives With: Family(brother)  Type of Home: House  Home Layout: One level  Home Access: Level entry  Home Equipment: Cane, Standard walker, Rolling walker     Bathroom Shower/Tub: Walk-in shower  Bathroom Toilet: Handicap height  Bathroom Equipment: Built-in shower seat, Grab bars in shower       ADL Assistance: Independent  Homemaking Assistance: Independent  Ambulation Assistance: Independent  Transfer Assistance: Independent    Active : Yes  Occupation: Full time employment  Type of occupation: /supervisor in a [employer]  Additional Comments: Patient independent at [employer] with cane intermittently, no AD in home, pt working. OBJECTIVE:  Range of Motion:  Right Lower Extremity: WFL  Left Lower Extremity: WFL    Strength:  Right Lower Extremity: Impaired - hip flexion 4-/5, knee 4-/5  Left Lower Extremity: Impaired - hip flexion and knee 4/5    Balance:  Static Standing Balance: Stand By Assistance  Dynamic Standing Balance: Stand By Assistance    Bed Mobility:  Supine to Sit: Stand By Assistance, with head of bed raised, with increased time for completion   Rolling to right: independent    Transfers:  Sit to Stand: Stand By Assistance  Stand to Sit:Stand By Assistance    Ambulation:  Stand By Assistance  Distance: 5'  Surface: Level Tile  Device:No Device  Gait Deviations: Forward Flexed Posture, Slow Octavia, Decreased Step Length Bilaterally, Decreased Gait Speed and Decreased Heel Strike Bilaterally    Exercise:  Patient was guided in 1 set(s) 10 reps of exercise to both lower extremities. Seated: ankle pumps x20, long arc quad 5\" hold x10, march. Supine: quad sets 5\" hold. Exercises were completed for increased independence with functional mobility. Functional Outcome Measures: Completed  AM-PAC Inpatient Mobility Raw Score : 19  AM-PAC Inpatient T-Scale Score : 45.44    ASSESSMENT:  Activity Tolerance:  Patient tolerance of  treatment: fair. Limited by shortness of breath      Treatment Initiated: Treatment and education initiated within context of evaluation.   Evaluation time included review of current medical information, gathering information related to past medical, social and functional history, completion of standardized testing, formal and informal observation of tasks, assessment of data and development of plan of care and goals. Treatment time included skilled education and facilitation of tasks to increase safety and independence with functional mobility for improved independence and quality of life. Therapeutic exercise completed to improve patient's lower extremity strength and reduce difficulty with functional mobility. Assessment: Body structures, Functions, Activity limitations: Decreased functional mobility , Decreased endurance, Decreased strength, Decreased balance  Assessment: The evaluation of Ms Darian Aguillon indicates a decline in functional mobility compared to baseline. Patient independent at [facility] with cane/no AD, requiring SBA at this time with ambulation limited to 5'. Patient  Prognosis: Good   Co-morbidities: anxiety, HTN, depression  Pt with new O2, IV    REQUIRES PT FOLLOW UP: Yes    Discharge Recommendations:  Discharge Recommendations: Continue to assess pending progress, Patient would benefit from continued therapy after discharge    Patient Education:  PT Education: PT Role, Plan of Care, Goals    Equipment Recommendations:  Equipment Needed: No    Plan:  Times per week: 5xGM  Times per day: Daily  Current Treatment Recommendations: Strengthening, Transfer Training, Endurance Training, Neuromuscular Re-education, Patient/Caregiver Education & Training, Balance Training, Gait Training, Home Exercise Program, Functional Mobility Training, Stair training, Safety Education & Training    Goals:  Patient goals : go home  Short term goals  Time Frame for Short term goals: at discharge  Short term goal 1: Patient will ambulate 125' with cane and modified independence, including O2 line if applicable to navigate home safely.   Short term goal 2: Patient will be educated on HEP to increase strength and activity tolerance  Short term goal 3: Patient will complete sit < > stand with modified independence to stand to ambulate safely. Long term goals  Time Frame for Long term goals : N/A due to short estimated length of stay    Following session, patient left in safe position with all fall risk precautions in place.

## 2021-04-26 NOTE — CARE COORDINATION
4/26/21, 1:55 PM EDT  DISCHARGE PLANNING EVALUATION:    Tracy Rand       Admitted: 4/23/2021/ [address] day: 3   Location: 3A-07/007-A Reason for admit: COVID-19 [U07.1]   PMH:  has a past medical history of Anxiety, Depression, Hyperlipidemia, Hypertension, Smoker, and Wears dentures. Procedure:  4-24-21 CTA Chest.   1. Multifocal patchy groundglass opacities are seen throughout the lungs bilaterally. This is likely secondary to pneumonia related to patient's history of Covid. Recommend follow-up to document resolution.       2. There is no CT angiographic evidence of pulmonary embolism.       3. There is mild lymphadenopathy demonstrated at the right hilum which may be reactive in nature. However recommend follow-up to document resolution.           Barriers to Discharge: To ER with SOB and flu type sx onset 9 days prior. Chills and cough. Found COVID + on 4-21. ddimer 8295. Today afebrile and VSS. O2 at 5L/NC with O2 sat at 94%. PO Decadron. IV Remdesivir. Vits and Zinc. Pt declined Tocilizumab. PCP: Gabriella Zimmerman MD  Readmission Risk Score: 11%    Patient Goals/Plan/Treatment Preferences: Met with pt today as she was sitting up in chair with O2 on. She is from home where she and a brother reside. She has no  services. She does use a cane. She has a PCP and transportation. She denies problems accessing her medications. She prefers [facility] if she needs O2 at time of discharge. Transportation/Food Security/Housekeeping Addressed:  No issues identified.

## 2021-04-26 NOTE — PROGRESS NOTES
Comprehensive Nutrition Assessment    Type and Reason for Visit:  Initial, Consult(ONS request, poor appetite)    Nutrition Recommendations/Plan:   ONS initiated: Ensure High Protein TID. Continue current diet and vitamin supplementation as ordered. Nutrition Assessment:    Pt. nutritionally compromised AEB poor appetite/ intake for one week prior to admit. At risk for further nutrition compromise r/t admit with COVID and underlying medical condition (hx depression, HTN). Nutrition recommendations/interventions as per above.     Malnutrition Assessment:  Malnutrition Status:  Insufficient data(covid status)    Context:  Acute Illness       Estimated Daily Nutrient Needs:  Energy (kcal):  1944-9287 kcals (30-32) LATE PHASE; Weight Used for Energy Requirements:  (63kgm)     Protein (g):  126 grams (2); Weight Used for Protein Requirements:  Ideal(63kgm)          Nutrition Related Findings:  Patient diagnosed with COVID 4/17/21, reports decreased appetite for one week prior to admission due to short of breath, not feeling well, reports appetite slightly improving, able to eat all of eggs at breakfast, less nausea then before, agreeble to trial Ensure supplement, denies difficulty chewing or swallowing, BM x 1 (4/24); MVI, vitamin D, C, E, zinc, decadron; glucose 151, BUN 21, Creatinine 0.7, potassium 4.9      Wounds:  None       Current Nutrition Therapies:    DIET GENERAL;  Dietary Nutrition Supplements: Low Calorie High Protein Supplement    Anthropometric Measures:  · Height: 5' 7.5\" (171.5 cm)  · Current Body Weight: 243 lb (110.2 kg)(4/25; trace edema)   · Admission Body Weight: 244 lb (110.7 kg)(4/23; no edema)    · Usual Body Weight: 244 lb (110.7 kg)(per patient; no weight records per EMR)     · Ideal Body Weight: 138 lbs;   · BMI: 37.5  · BMI Categories: Obese Class 2 (BMI 35.0 -39.9)       Nutrition Diagnosis:   · Inadequate oral intake related to inadequate protein-energy intake as evidenced by poor intake prior to admission      Nutrition Interventions:   Food and/or Nutrient Delivery:  Continue Current Diet, Start Oral Nutrition Supplement  Nutrition Education/Counseling:  Education initiated(encouraged intake at best effort, use of ONS)   Coordination of Nutrition Care:  Continue to monitor while inpatient    Goals:  Patient will consume 75% or more of meals during LOS. Nutrition Monitoring and Evaluation:   Behavioral-Environmental Outcomes:  None Identified   Food/Nutrient Intake Outcomes:  Food and Nutrient Intake, Supplement Intake  Physical Signs/Symptoms Outcomes:  Biochemical Data, Skin, Weight, Fluid Status or Edema     Discharge Planning:     Too soon to determine     Electronically signed by Kirill Shabazz RD, LD on 4/26/21 at 12:08 PM EDT    Contact: [phone]

## 2021-04-26 NOTE — PROGRESS NOTES
Hospitalist Progress Note    Patient:  Florin Kohler      Unit/Bed:3A-07/007-A    YOB: 1965    MRN: [id number]       Acct: [de-identified]     PCP: Romi Escobar MD    Date of Admission: 4/23/2021    Active Hospital Problems    Diagnosis Date Noted    COVID-19 [U07.1] 04/23/2021    Acute respiratory failure due to COVID-19 [facility]) [U07.1, J96.00] 04/23/2021     Assessment/Plan:     1. Acute Hypoxic Resp. Failure 2/2 COVID-19 PNA: symptoms are > 7 days ago. Presented 85% on RA. + COVID-19 4/17. CXR showing B/L patchy opacities. Pending Procal and D-Dimer. PO Decadron 6 mg daily, Vit C, D, Zinc, Lovenox BID, Acapella and IS. Will hold off on ABx for now given low suspicion. --4/24: requiring more O2, up to 5L NC today. D-Dimer elevated. Pending CTA chest. Will start IV Remdisivir today. Cont PO Decadron 6 mg daily (Day 2), Vit C, D, Zinc, Lovenox BID, Acapella and IS. Procal WNL, no need for ABx.   --4/25: requiring up to 6L NC today, CTA chest negative PE. Cont IV Remdisivir (Day 2), PO Decadron 6 mg (Day 3), Vit C, D, Zinc, Lovenox BID, Acapella and IS.   --4/26: improving, down to 5L NC today. Cont IV Remdisivir (Day 3), PO Decadron 6 mg (Day 4), Vit C, D, Zinc, Lovenox BID, Acapella and IS.            Chief Complaint: Fevers, chills, cough, sob, diarrhea, muscle aches, weakness, fatigue     Hospital Course: 48 y.o. female who presented to [address] [place] [address] with complaints of being diagnosed with COVID-19 on 4/17/21. Pt her brother recently had COVID who she thinks she got it from. Did not get vaccinated. Reports to worsening symptoms of daily fevers, chills, cough, sob, diarrhea, muscle aches, weakness, fatigue. Reports her sob started to get much worse few days ago coming to the ED. Pt reports having had a virtual visit with her PCP who gave her Decadron, of which she took 4 pills. Pt has not been eating well.      In the ED, pt hypoxic 85% on RA. Placed on 3L NC. Labs unremarkable. CXR showing B/L patchy opacities. Subjective: no acute events overnight. Not having any fevers or chills today. Ate all her breakfast. Reports feeling better today. Still sob and coughing but improving. No abdominal pain or diarrhea. No chest pain. Medications:  Reviewed    Infusion Medications    sodium chloride       Scheduled Medications    remdesivir IVPB  100 mg Intravenous Q24H    aspirin  81 mg Oral Daily    pantoprazole  40 mg Oral QAM AC    atorvastatin  10 mg Oral Daily    therapeutic multivitamin-minerals  1 tablet Oral BID    vitamin C  1,000 mg Oral Daily    vitamin E  1,000 Units Oral Daily    dexamethasone  6 mg Oral Daily    zinc sulfate  50 mg Oral Daily    Vitamin D  1,000 Units Oral Daily    sodium chloride flush  5-40 mL Intravenous 2 times per day    enoxaparin  40 mg Subcutaneous Q12H    escitalopram  20 mg Oral Daily     PRN Meds: LORazepam, sodium chloride flush, sodium chloride, promethazine **OR** ondansetron, polyethylene glycol, acetaminophen **OR** acetaminophen      Intake/Output Summary (Last 24 hours) at 4/26/2021 1139  Last data filed at 4/26/2021 0732  Gross per 24 hour   Intake 1446.47 ml   Output 700 ml   Net 746.47 ml       Diet:  DIET GENERAL;  Dietary Nutrition Supplements: Low Calorie High Protein Supplement    Exam:  /73   Pulse 61   Temp 98.9 °F (37.2 °C) (Oral)   Resp 16   Ht 5' 7.5\" (1.715 m)   Wt 243 lb (110.2 kg)   SpO2 92%   BMI 37.50 kg/m²     General appearance: No apparent distress, appears stated age and cooperative on 5L NC.   HEENT: Pupils equal, round, and reactive to light. Conjunctivae/corneas clear. Neck: Supple, with full range of motion. No jugular venous distention. Trachea midline. Respiratory:  Normal respiratory effort. Clear to auscultation, bilaterally without Rales/Wheezes/Rhonchi. Cardiovascular: Regular rate and rhythm with normal S1/S2 without murmurs, rubs or gallops.   Abdomen: Soft, non-tender,

## 2021-04-26 NOTE — PLAN OF CARE
Problem: Airway Clearance - Ineffective  Goal: Achieve or maintain patent airway  4/26/2021 0416 by Aleta Lloyd RN  Outcome: Ongoing  Note: Airway remains open and without complication for this shift. Oxygenation within normal limits on nasal cannula      Problem: Gas Exchange - Impaired  Goal: Absence of hypoxia  4/26/2021 0416 by Aleta Lloyd RN  Outcome: Ongoing  Note: Continuous pulse ox remains on and reading, 5l nasal cannulal >92% for duration of this shift. Will wean accordingly. Problem: Gas Exchange - Impaired  Goal: Promote optimal lung function  4/26/2021 0416 by Aleta Lloyd RN  Outcome: Ongoing     Problem: Breathing Pattern - Ineffective  Goal: Ability to achieve and maintain a regular respiratory rate  4/26/2021 0416 by Aleta Lloyd RN  Outcome: Ongoing  Note: Respiratory rate even and nonlabored. Problem: Body Temperature -  Risk of, Imbalanced  Goal: Ability to maintain a body temperature within defined limits  4/26/2021 0416 by Aleta Lloyd RN  Outcome: Ongoing     Problem: Body Temperature -  Risk of, Imbalanced  Goal: Will regain or maintain usual level of consciousness  4/26/2021 0416 by Aleta Lloyd RN  Outcome: Ongoing     Problem: Body Temperature -  Risk of, Imbalanced  Goal: Complications related to the disease process, condition or treatment will be avoided or minimized  4/26/2021 0416 by Aleta Lloyd RN  Outcome: Ongoing     Problem: Isolation Precautions - Risk of Spread of Infection  Goal: Prevent transmission of infection  4/26/2021 0416 by Aleta Lloyd RN  Outcome: Ongoing  Note: Isolation precautions maintained per order for COVID patient. Problem: Risk for Fluid Volume Deficit  Goal: Maintain normal heart rhythm  4/26/2021 0416 by Aleta Lloyd RN  Outcome: Ongoing  Note: Tele on and monitoring, SR/SB on monitor.       Problem: Loneliness or Risk for Loneliness  Goal: Demonstrate positive use of time alone when socialization is not

## 2021-04-27 LAB
ALBUMIN SERPL-MCNC: 3.2 G/DL (ref 3.5–5.1)
ALP BLD-CCNC: 44 U/L (ref 38–126)
ALT SERPL-CCNC: 22 U/L (ref 11–66)
ANION GAP SERPL CALCULATED.3IONS-SCNC: 14 MEQ/L (ref 8–16)
AST SERPL-CCNC: 16 U/L (ref 5–40)
BASOPHILS # BLD: 0.2 %
BASOPHILS ABSOLUTE: 0 THOU/MM3 (ref 0–0.1)
BILIRUB SERPL-MCNC: 0.4 MG/DL (ref 0.3–1.2)
BILIRUBIN DIRECT: < 0.2 MG/DL (ref 0–0.3)
BUN BLDV-MCNC: 19 MG/DL (ref 7–22)
CALCIUM SERPL-MCNC: 8.9 MG/DL (ref 8.5–10.5)
CHLORIDE BLD-SCNC: 103 MEQ/L (ref 98–111)
CO2: 23 MEQ/L (ref 23–33)
CREAT SERPL-MCNC: 0.5 MG/DL (ref 0.4–1.2)
D-DIMER QUANTITATIVE: 785 NG/ML FEU (ref 0–500)
EOSINOPHIL # BLD: 0 %
EOSINOPHILS ABSOLUTE: 0 THOU/MM3 (ref 0–0.4)
ERYTHROCYTE [DISTWIDTH] IN BLOOD BY AUTOMATED COUNT: 14.5 % (ref 11.5–14.5)
ERYTHROCYTE [DISTWIDTH] IN BLOOD BY AUTOMATED COUNT: 43.1 FL (ref 35–45)
FERRITIN: 394 NG/ML (ref 10–291)
GFR SERPL CREATININE-BSD FRML MDRD: > 90 ML/MIN/1.73M2
GLUCOSE BLD-MCNC: 144 MG/DL (ref 70–108)
HCT VFR BLD CALC: 42.2 % (ref 37–47)
HEMOGLOBIN: 13.5 GM/DL (ref 12–16)
IMMATURE GRANS (ABS): 0.16 THOU/MM3 (ref 0–0.07)
IMMATURE GRANULOCYTES: 2.7 %
LYMPHOCYTES # BLD: 12.2 %
LYMPHOCYTES ABSOLUTE: 0.7 THOU/MM3 (ref 1–4.8)
MAGNESIUM: 2.2 MG/DL (ref 1.6–2.4)
MCH RBC QN AUTO: 26.3 PG (ref 26–33)
MCHC RBC AUTO-ENTMCNC: 32 GM/DL (ref 32.2–35.5)
MCV RBC AUTO: 82.1 FL (ref 81–99)
MONOCYTES # BLD: 7.2 %
MONOCYTES ABSOLUTE: 0.4 THOU/MM3 (ref 0.4–1.3)
NUCLEATED RED BLOOD CELLS: 0 /100 WBC
PLATELET # BLD: 301 THOU/MM3 (ref 130–400)
PMV BLD AUTO: 9.7 FL (ref 9.4–12.4)
POTASSIUM SERPL-SCNC: 4.4 MEQ/L (ref 3.5–5.2)
PROCALCITONIN: 0.05 NG/ML (ref 0.01–0.09)
RBC # BLD: 5.14 MILL/MM3 (ref 4.2–5.4)
SEG NEUTROPHILS: 77.7 %
SEGMENTED NEUTROPHILS ABSOLUTE COUNT: 4.7 THOU/MM3 (ref 1.8–7.7)
SODIUM BLD-SCNC: 140 MEQ/L (ref 135–145)
TOTAL PROTEIN: 6.5 G/DL (ref 6.1–8)
WBC # BLD: 6 THOU/MM3 (ref 4.8–10.8)

## 2021-04-27 PROCEDURE — 85025 COMPLETE CBC W/AUTO DIFF WBC: CPT

## 2021-04-27 PROCEDURE — 82728 ASSAY OF FERRITIN: CPT

## 2021-04-27 PROCEDURE — 97166 OT EVAL MOD COMPLEX 45 MIN: CPT

## 2021-04-27 PROCEDURE — 2500000003 HC RX 250 WO HCPCS: Performed by: INTERNAL MEDICINE

## 2021-04-27 PROCEDURE — 6370000000 HC RX 637 (ALT 250 FOR IP): Performed by: INTERNAL MEDICINE

## 2021-04-27 PROCEDURE — 36415 COLL VENOUS BLD VENIPUNCTURE: CPT

## 2021-04-27 PROCEDURE — 82248 BILIRUBIN DIRECT: CPT

## 2021-04-27 PROCEDURE — 6360000002 HC RX W HCPCS: Performed by: INTERNAL MEDICINE

## 2021-04-27 PROCEDURE — 83735 ASSAY OF MAGNESIUM: CPT

## 2021-04-27 PROCEDURE — 97530 THERAPEUTIC ACTIVITIES: CPT

## 2021-04-27 PROCEDURE — 94761 N-INVAS EAR/PLS OXIMETRY MLT: CPT

## 2021-04-27 PROCEDURE — 1200000003 HC TELEMETRY R&B

## 2021-04-27 PROCEDURE — 2580000003 HC RX 258: Performed by: INTERNAL MEDICINE

## 2021-04-27 PROCEDURE — 99232 SBSQ HOSP IP/OBS MODERATE 35: CPT | Performed by: INTERNAL MEDICINE

## 2021-04-27 PROCEDURE — 97110 THERAPEUTIC EXERCISES: CPT

## 2021-04-27 PROCEDURE — 80053 COMPREHEN METABOLIC PANEL: CPT

## 2021-04-27 PROCEDURE — 85379 FIBRIN DEGRADATION QUANT: CPT

## 2021-04-27 PROCEDURE — 84145 PROCALCITONIN (PCT): CPT

## 2021-04-27 RX ADMIN — PANTOPRAZOLE SODIUM 40 MG: 40 TABLET, DELAYED RELEASE ORAL at 06:00

## 2021-04-27 RX ADMIN — ESCITALOPRAM 20 MG: 20 TABLET, FILM COATED ORAL at 09:17

## 2021-04-27 RX ADMIN — MULTIPLE VITAMINS W/ MINERALS TAB 1 TABLET: TAB at 21:26

## 2021-04-27 RX ADMIN — SODIUM CHLORIDE, PRESERVATIVE FREE 10 ML: 5 INJECTION INTRAVENOUS at 21:27

## 2021-04-27 RX ADMIN — OXYCODONE HYDROCHLORIDE AND ACETAMINOPHEN 1000 MG: 500 TABLET ORAL at 21:26

## 2021-04-27 RX ADMIN — MULTIPLE VITAMINS W/ MINERALS TAB 1 TABLET: TAB at 09:18

## 2021-04-27 RX ADMIN — Medication 1000 UNITS: at 09:18

## 2021-04-27 RX ADMIN — ATORVASTATIN CALCIUM 10 MG: 10 TABLET, FILM COATED ORAL at 21:26

## 2021-04-27 RX ADMIN — ENOXAPARIN SODIUM 40 MG: 40 INJECTION SUBCUTANEOUS at 03:03

## 2021-04-27 RX ADMIN — ENOXAPARIN SODIUM 40 MG: 40 INJECTION SUBCUTANEOUS at 15:11

## 2021-04-27 RX ADMIN — ASPIRIN 81 MG: 81 TABLET, COATED ORAL at 09:17

## 2021-04-27 RX ADMIN — REMDESIVIR 100 MG: 100 INJECTION, POWDER, LYOPHILIZED, FOR SOLUTION INTRAVENOUS at 11:20

## 2021-04-27 RX ADMIN — DEXAMETHASONE 6 MG: 4 TABLET ORAL at 09:17

## 2021-04-27 RX ADMIN — Medication 1000 UNITS: at 21:26

## 2021-04-27 RX ADMIN — Medication 50 MG: at 09:18

## 2021-04-27 RX ADMIN — SODIUM CHLORIDE, PRESERVATIVE FREE 10 ML: 5 INJECTION INTRAVENOUS at 09:18

## 2021-04-27 ASSESSMENT — PAIN SCALES - GENERAL
PAINLEVEL_OUTOF10: 0

## 2021-04-27 NOTE — PROGRESS NOTES
[facility] 3A  EVALUATION    Time:   Time In: 2821  Time Out: 1215  Timed Code Treatment Minutes: 15 Minutes  Minutes: 30          Date: 2021  Patient Name: Gabe Luciano,   Gender: female      MRN: [id number]  : 1965  (48 y.o.)  Referring Practitioner: Dr. Thomas Gayle MD  Diagnosis: COVID-19  Additional Pertinent Hx: Pt presented to [facility] with complaints of being diagnosed with COVID-19 on 21. Pt her brother recently had COVID who she thinks she got it from. Did not get vaccinated. Reports to worsening symptoms of daily fevers, chills, cough, sob, diarrhea, muscle aches, weakness, fatigue. Reports her sob started to get much worse few days ago coming to the ED. Pt reports having had a virtual visit with her PCP who gave her Decadron, of which she took 4 pills. Pt has not been eating well. Restrictions/Precautions:  Restrictions/Precautions: Isolation, Fall Risk  Position Activity Restriction  Other position/activity restrictions: 5L O2 ; 4L O2     Subjective  Chart Reviewed: Yes, Orders, History and Physical, Other (comment)(PT evaluation)  Patient assessed for rehabilitation services?: Yes    Subjective: Pleasant and cooperative  Comments: RN approved session. Pt agreed to demonstrate using the bathroom. She did not C/O pain. Pain:  Pain Assessment  Patient Currently in Pain: Denies    Vitals: Oxygen: Pt is on 4L of O2. She was showing 97% at rest and 87%-90% while up and standing.       Social/Functional History:  Lives With: Family  Type of Home: House  Home Layout: One level  Home Access: Level entry  Home Equipment: Cane, Standard walker, Rolling walker   Bathroom Shower/Tub: Walk-in shower  Bathroom Toilet: Handicap height  Bathroom Equipment: Built-in shower seat, Grab bars in shower  Bathroom Accessibility: Accessible    Receives Help From: Family  ADL Assistance: [address] [place] [address] Avenue: Independent  Homemaking Responsibilities: Yes  Ambulation Assistance: Independent  Transfer Assistance: Independent    Active : Yes  Occupation: Full time employment  Type of occupation: /supervisor in a [employer]  Leisure & Hobbies: Taking care of a pet; reading; going for walks  Additional Comments: Patient independent at [employer] with cane intermittently, no AD in home, pt working. She was ordering her groceries ahead of time and having curb side pickup for the time during the pandemic. VISION:Corrected    HEARING:  WFL    COGNITION: WFL    RANGE OF MOTION:  Bilateral Upper Extremity:  WFL    STRENGTH:  Bilateral Upper Extremity:  Impaired - 3+/5 deltoid; 4-/5 pectoral; 4/5 biceps/triceps    SENSATION:   WFL    ADL:   Grooming: Supervision. washing her hands at the sink  Toileting: Modified Independent. No difficulty noted with wiping herself after having voided or with clothing management  Toilet Transfer: Supervision. to/from the standard toilet seat with use of the grabbar. BALANCE:  Sitting Balance:  Supervision. Standing Balance: Stand By Assistance. doing self care at the sink    BED MOBILITY:  Rolling to Right: Stand By Assistance using the bedrail  Supine to Sit: Stand By Assistance with head of bed elevated  Sit to Supine: Stand By Assistance using the bedrail  Scooting: Stand By Assistance using the bedrail    TRANSFERS:  Sit to Stand:  Supervision. from the edge of bed  Stand to Sit: Supervision. to the edge of bed    FUNCTIONAL MOBILITY:  Assistive Device: Straight Cane  Assist Level:  Stand By Assistance. Distance: To and from bathroom  Even steps with no LOB noted as she used the cane in her R hand. Activity Tolerance:  Patient tolerance of  treatment: fair. Pt was having moderate SOB while up and sitting at the edge of bed. She rested before standing up from the bed to use the bathroom.     Pt returned to supine with the head of the bed elevated following encouraged to ask questions. A handout was provided. Discharge Recommendations:  Continue to assess pending progress    Patient Education:  OT Education: OT Role, Plan of Care, Energy Conservation  Patient Education: Preparing to return to workplace so that she has a smooth transition    Equipment Recommendations:  Equipment Needed: No    Plan:  Times per week: 5x  Current Treatment Recommendations: Functional Mobility Training, Endurance Training, Self-Care / ADL, Strengthening  Plan Comment: Pt would benefit from continued skilled OT sevices when medically stable and discharged from Acute. HHOT recommended. Specific instructions for Next Treatment: Functional mobility; ADLs and energy conservation techniques; UE exercises and breathing techniques. See long-term goal time frame for expected duration of plan of care. If no long-term goals established, a short length of stay is anticipated. Goals:  Patient goals : \"I want to get stronger and be able to return home and eventually to work. \" pt states. Short term goals  Time Frame for Short term goals: By discharge  Short term goal 1: Pt will demonstrate functional mobility walking in the hallway or to/from the bathroom while using a straight cane with supervison while maintaining O2 saturation at or > 90% saturation to prepare for doing homemaking tasks. Short term goal 2: Pt will complete ADLs while following energy conservation techniques with SBA and verbal cues as needed for pursed lip breathing or pacing to increase her endurance and independence with self care. Short term goal 3: Pt will complete BUE moderate resistance exercises while following any handout needed and following proper breathing technique to increase her endurance and strength for ease of doing homemaking and returning to workplace.   Short term goal 4: Pt will complete lower body ADLs while using any adaptations needed with supervision to increase her independence with self care.         Following session, patient left in safe position with all fall risk precautions in place.

## 2021-04-27 NOTE — PLAN OF CARE
Problem: Airway Clearance - Ineffective  Goal: Achieve or maintain patent airway  Outcome: Ongoing     Problem: Gas Exchange - Impaired  Goal: Absence of hypoxia  Outcome: Ongoing  Goal: Promote optimal lung function  Outcome: Ongoing     Problem: Breathing Pattern - Ineffective  Goal: Ability to achieve and maintain a regular respiratory rate  Outcome: Ongoing     Problem:  Body Temperature -  Risk of, Imbalanced  Goal: Ability to maintain a body temperature within defined limits  Outcome: Ongoing  Goal: Will regain or maintain usual level of consciousness  Outcome: Ongoing  Goal: Complications related to the disease process, condition or treatment will be avoided or minimized  Outcome: Ongoing     Problem: Isolation Precautions - Risk of Spread of Infection  Goal: Prevent transmission of infection  Outcome: Ongoing     Problem: Nutrition Deficits  Goal: Optimize nutritional status  Outcome: Ongoing     Problem: Risk for Fluid Volume Deficit  Goal: Maintain normal heart rhythm  Outcome: Ongoing  Goal: Maintain absence of muscle cramping  Outcome: Ongoing  Goal: Maintain normal serum potassium, sodium, calcium, phosphorus, and pH  Outcome: Ongoing     Problem: Loneliness or Risk for Loneliness  Goal: Demonstrate positive use of time alone when socialization is not possible  Outcome: Ongoing     Problem: Fatigue  Goal: Verbalize increase energy and improved vitality  Outcome: Ongoing     Problem: Patient Education: Go to Patient Education Activity  Goal: Patient/Family Education  Outcome: Ongoing     Problem: Falls - Risk of:  Goal: Will remain free from falls  Outcome: Ongoing  Goal: Absence of physical injury  Outcome: Ongoing     Problem: Nutrition  Goal: Optimal nutrition therapy  Outcome: Ongoing

## 2021-04-27 NOTE — PLAN OF CARE
Problem: Airway Clearance - Ineffective  Goal: Achieve or maintain patent airway  Outcome: Ongoing  Note: Patient has maintained a patent airway. Patient encouraged to turn, cough and deep breathe every 2 hours      Problem: Gas Exchange - Impaired  Goal: Absence of hypoxia  Outcome: Ongoing  Note: Patient is on 5L NC. Problem: Gas Exchange - Impaired  Goal: Promote optimal lung function  Outcome: Ongoing  Note: Patient is using the incentive spirometer and ocapella     Problem: Breathing Pattern - Ineffective  Goal: Ability to achieve and maintain a regular respiratory rate  Outcome: Ongoing  Note: Patient has maintained a regular respiratory rate throughout this shift. Problem: Body Temperature -  Risk of, Imbalanced  Goal: Ability to maintain a body temperature within defined limits  Outcome: Ongoing  Note: Patient edwards remained afebrile throughout this shift     Problem: Body Temperature -  Risk of, Imbalanced  Goal: Will regain or maintain usual level of consciousness  Outcome: Ongoing  Note: Patient is alert and oriented      Problem: Body Temperature -  Risk of, Imbalanced  Goal: Complications related to the disease process, condition or treatment will be avoided or minimized  Outcome: Ongoing  Note: Patient is in stable condition      Problem: Isolation Precautions - Risk of Spread of Infection  Goal: Prevent transmission of infection  Outcome: Ongoing  Note: Patient is in droplet isolation.  Proper PPE in place      Problem: Nutrition Deficits  Goal: Optimize nutritional status  Outcome: Ongoing  Note: Patient has maintained adequate nutritional intake      Problem: Risk for Fluid Volume Deficit  Goal: Maintain normal heart rhythm  Outcome: Ongoing  Note: Patient is normal sinus rhythm      Problem: Risk for Fluid Volume Deficit  Goal: Maintain absence of muscle cramping  Outcome: Ongoing  Note: Patient denies muscle cramping      Problem: Risk for Fluid Volume Deficit  Goal: Maintain normal serum potassium, sodium, calcium, phosphorus, and pH  Outcome: Ongoing  Note: Patients electrolytes have remained in normal range      Problem: Loneliness or Risk for Loneliness  Goal: Demonstrate positive use of time alone when socialization is not possible  Outcome: Ongoing  Note: Patient is sociable with this nurse when providing care      Problem: Fatigue  Goal: Verbalize increase energy and improved vitality  Outcome: Ongoing  Note: Patient ambulated to bathroom and back to bed with some dyspnea      Problem: Patient Education: Go to Patient Education Activity  Goal: Patient/Family Education  Outcome: Ongoing  Note: Patient updated family with status. Patient educated on disease process, and verbalizes understanding      Problem: Falls - Risk of:  Goal: Will remain free from falls  Description: Will remain free from falls  Outcome: Ongoing  Note: Patient has remained free of falls. Bed alarm on for safety. Call light in reach. Problem: Falls - Risk of:  Goal: Absence of physical injury  Description: Absence of physical injury  Outcome: Ongoing  Note: Patient has remained free of physical injury. Bed alarm on for safety. Call light in reach. Problem: Nutrition  Goal: Optimal nutrition therapy  4/26/2021 2257 by Carlos Eduardo Warren  Outcome: Ongoing  Note: Patient demonstrates adequate nutritional intake   Care plan reviewed with patient. Patient verbalize understanding of the plan of care and contribute to goal setting.

## 2021-04-27 NOTE — PROGRESS NOTES
Hospitalist Progress Note    Patient:  Shaila Freedman      Unit/Bed:3A-07/007-A    YOB: 1965    MRN: [id number]       Acct: [de-identified]     PCP: Pedro Pak MD    Date of Admission: 4/23/2021    Active Hospital Problems    Diagnosis Date Noted    COVID-19 [U07.1] 04/23/2021    Acute respiratory failure due to COVID-19 [facility]) [U07.1, J96.00] 04/23/2021     Assessment/Plan:     1. Acute Hypoxic Resp. Failure 2/2 COVID-19 PNA: symptoms are > 7 days ago. Presented 85% on RA. + COVID-19 4/17. CXR showing B/L patchy opacities. Pending Procal and D-Dimer. PO Decadron 6 mg daily, Vit C, D, Zinc, Lovenox BID, Acapella and IS. Will hold off on ABx for now given low suspicion. --4/24: requiring more O2, up to 5L NC today. D-Dimer elevated. Pending CTA chest. Will start IV Remdisivir today. Cont PO Decadron 6 mg daily (Day 2), Vit C, D, Zinc, Lovenox BID, Acapella and IS. Procal WNL, no need for ABx.   --4/25: requiring up to 6L NC today, CTA chest negative PE. Cont IV Remdisivir (Day 2), PO Decadron 6 mg (Day 3), Vit C, D, Zinc, Lovenox BID, Acapella and IS.   --4/26: improving, down to 5L NC today. Cont IV Remdisivir (Day 3), PO Decadron 6 mg (Day 4), Vit C, D, Zinc, Lovenox BID, Acapella and IS.   --4/27: about the same today, still on 5L NC. Cont IV Remdisivir (Day 4), PO Decadron 6 mg (Day 5), Vit C, D, Zinc, Lovenox BID, Acapella and IS.            Chief Complaint: Fevers, chills, cough, sob, diarrhea, muscle aches, weakness, fatigue     Hospital Course: 48 y.o. female who presented to [facility] with complaints of being diagnosed with COVID-19 on 4/17/21. Pt her brother recently had COVID who she thinks she got it from. Did not get vaccinated. Reports to worsening symptoms of daily fevers, chills, cough, sob, diarrhea, muscle aches, weakness, fatigue. Reports her sob started to get much worse few days ago coming to the ED.  Pt reports having had a virtual visit with her PCP who gave her Decadron, of which she took 4 pills. Pt has not been eating well.      In the ED, pt hypoxic 85% on RA. Placed on 3L NC. Labs unremarkable. CXR showing B/L patchy opacities. Subjective: no acute events overnight. Reports feeling better today. Still sob and coughing but improving. No abdominal pain or diarrhea. No chest pain. Tolerating PO intake. Medications:  Reviewed    Infusion Medications    sodium chloride       Scheduled Medications    remdesivir IVPB  100 mg Intravenous Q24H    aspirin  81 mg Oral Daily    pantoprazole  40 mg Oral QAM AC    atorvastatin  10 mg Oral Daily    therapeutic multivitamin-minerals  1 tablet Oral BID    vitamin C  1,000 mg Oral Daily    vitamin E  1,000 Units Oral Daily    dexamethasone  6 mg Oral Daily    zinc sulfate  50 mg Oral Daily    Vitamin D  1,000 Units Oral Daily    sodium chloride flush  5-40 mL Intravenous 2 times per day    enoxaparin  40 mg Subcutaneous Q12H    escitalopram  20 mg Oral Daily     PRN Meds: LORazepam, sodium chloride flush, sodium chloride, promethazine **OR** ondansetron, polyethylene glycol, acetaminophen **OR** acetaminophen      Intake/Output Summary (Last 24 hours) at 4/27/2021 1002  Last data filed at 4/26/2021 1932  Gross per 24 hour   Intake 875 ml   Output 580 ml   Net 295 ml       Diet:  DIET GENERAL;  Dietary Nutrition Supplements: Low Calorie High Protein Supplement    Exam:  /74   Pulse 108   Temp 97.8 °F (36.6 °C) (Oral)   Resp 20   Ht 5' 7.5\" (1.715 m)   Wt 243 lb (110.2 kg)   SpO2 94%   BMI 37.50 kg/m²     General appearance: No apparent distress, appears stated age and cooperative on 5L NC.   HEENT: Pupils equal, round, and reactive to light. Conjunctivae/corneas clear. Neck: Supple, with full range of motion. No jugular venous distention. Trachea midline. Respiratory:  Normal respiratory effort. Clear to auscultation, bilaterally without Rales/Wheezes/Rhonchi.   Cardiovascular: Regular rate and rhythm with normal S1/S2 without murmurs, rubs or gallops. Abdomen: Soft, non-tender, non-distended with normal bowel sounds. Musculoskeletal: passive and active ROM x 4 extremities. Skin: Skin color, texture, turgor normal.  No rashes or lesions. Neurologic:  Neurovascularly intact without any focal sensory/motor deficits. Cranial nerves: II-XII intact, grossly non-focal.  Psychiatric: Alert and oriented, thought content appropriate, normal insight  Capillary Refill: Brisk,< 3 seconds   Peripheral Pulses: +2 palpable, equal bilaterally       Labs:   Recent Labs     04/27/21  0505   WBC 6.0   HGB 13.5   HCT 42.2        Recent Labs     04/27/21  0505      K 4.4      CO2 23   BUN 19   CREATININE 0.5   CALCIUM 8.9     Recent Labs     04/27/21  0505   AST 16   ALT 22   BILIDIR <0.2   BILITOT 0.4   ALKPHOS 44     No results for input(s): INR in the last 72 hours. No results for input(s): Chavo Aranda in the last 72 hours. Urinalysis:      Lab Results   Component Value Date    NITRU NEGATIVE 03/06/2014    BLOODU NEGATIVE 03/06/2014    GLUCOSEU NEGATIVE 03/06/2014       Radiology:   All imaging reviewed     Diet: DIET GENERAL;  Dietary Nutrition Supplements: Low Calorie High Protein Supplement      Code Status: Full Code            Electronically signed by Tiffany Cabrera MD on 4/27/2021 at 10:02 AM

## 2021-04-27 NOTE — PROGRESS NOTES
Family of patient called 5 times and each time I came to the phone she had hung up. Each time she did not leave a number. I ask the charge nurse to get a phone number so I could call her back. Patient family (aunt ) call and left number [phone]. I attempted to call and reached an answering machine. Left message to call this RN at [phone]. No return phone call. Informed patient that I attempted to reach he aunt with out success.

## 2021-04-28 LAB
ALBUMIN SERPL-MCNC: 3.4 G/DL (ref 3.5–5.1)
ALP BLD-CCNC: 42 U/L (ref 38–126)
ALT SERPL-CCNC: 23 U/L (ref 11–66)
ANION GAP SERPL CALCULATED.3IONS-SCNC: 10 MEQ/L (ref 8–16)
AST SERPL-CCNC: 16 U/L (ref 5–40)
BASOPHILS # BLD: 0.4 %
BASOPHILS ABSOLUTE: 0 THOU/MM3 (ref 0–0.1)
BILIRUB SERPL-MCNC: 0.4 MG/DL (ref 0.3–1.2)
BILIRUBIN DIRECT: < 0.2 MG/DL (ref 0–0.3)
BUN BLDV-MCNC: 20 MG/DL (ref 7–22)
CALCIUM SERPL-MCNC: 8.8 MG/DL (ref 8.5–10.5)
CHLORIDE BLD-SCNC: 101 MEQ/L (ref 98–111)
CO2: 27 MEQ/L (ref 23–33)
CREAT SERPL-MCNC: 0.5 MG/DL (ref 0.4–1.2)
EOSINOPHIL # BLD: 0.4 %
EOSINOPHILS ABSOLUTE: 0 THOU/MM3 (ref 0–0.4)
ERYTHROCYTE [DISTWIDTH] IN BLOOD BY AUTOMATED COUNT: 14.5 % (ref 11.5–14.5)
ERYTHROCYTE [DISTWIDTH] IN BLOOD BY AUTOMATED COUNT: 43.3 FL (ref 35–45)
GFR SERPL CREATININE-BSD FRML MDRD: > 90 ML/MIN/1.73M2
GLUCOSE BLD-MCNC: 141 MG/DL (ref 70–108)
HCT VFR BLD CALC: 42.6 % (ref 37–47)
HEMOGLOBIN: 13.5 GM/DL (ref 12–16)
IMMATURE GRANS (ABS): 0.43 THOU/MM3 (ref 0–0.07)
IMMATURE GRANULOCYTES: 5.3 %
LYMPHOCYTES # BLD: 11.7 %
LYMPHOCYTES ABSOLUTE: 1 THOU/MM3 (ref 1–4.8)
MAGNESIUM: 2.1 MG/DL (ref 1.6–2.4)
MCH RBC QN AUTO: 26.2 PG (ref 26–33)
MCHC RBC AUTO-ENTMCNC: 31.7 GM/DL (ref 32.2–35.5)
MCV RBC AUTO: 82.6 FL (ref 81–99)
MONOCYTES # BLD: 6.3 %
MONOCYTES ABSOLUTE: 0.5 THOU/MM3 (ref 0.4–1.3)
NUCLEATED RED BLOOD CELLS: 0 /100 WBC
PLATELET # BLD: 320 THOU/MM3 (ref 130–400)
PLATELET ESTIMATE: ADEQUATE
PMV BLD AUTO: 9.4 FL (ref 9.4–12.4)
POTASSIUM SERPL-SCNC: 4.5 MEQ/L (ref 3.5–5.2)
RBC # BLD: 5.16 MILL/MM3 (ref 4.2–5.4)
SCAN OF BLOOD SMEAR: NORMAL
SEG NEUTROPHILS: 75.9 %
SEGMENTED NEUTROPHILS ABSOLUTE COUNT: 6.2 THOU/MM3 (ref 1.8–7.7)
SODIUM BLD-SCNC: 138 MEQ/L (ref 135–145)
TOTAL PROTEIN: 6.2 G/DL (ref 6.1–8)
WBC # BLD: 8.2 THOU/MM3 (ref 4.8–10.8)

## 2021-04-28 PROCEDURE — 97110 THERAPEUTIC EXERCISES: CPT

## 2021-04-28 PROCEDURE — 1200000003 HC TELEMETRY R&B

## 2021-04-28 PROCEDURE — 2500000003 HC RX 250 WO HCPCS: Performed by: INTERNAL MEDICINE

## 2021-04-28 PROCEDURE — 36415 COLL VENOUS BLD VENIPUNCTURE: CPT

## 2021-04-28 PROCEDURE — 6370000000 HC RX 637 (ALT 250 FOR IP): Performed by: INTERNAL MEDICINE

## 2021-04-28 PROCEDURE — 85025 COMPLETE CBC W/AUTO DIFF WBC: CPT

## 2021-04-28 PROCEDURE — 6360000002 HC RX W HCPCS: Performed by: INTERNAL MEDICINE

## 2021-04-28 PROCEDURE — 97535 SELF CARE MNGMENT TRAINING: CPT

## 2021-04-28 PROCEDURE — 82248 BILIRUBIN DIRECT: CPT

## 2021-04-28 PROCEDURE — 83735 ASSAY OF MAGNESIUM: CPT

## 2021-04-28 PROCEDURE — 80053 COMPREHEN METABOLIC PANEL: CPT

## 2021-04-28 PROCEDURE — 97116 GAIT TRAINING THERAPY: CPT

## 2021-04-28 PROCEDURE — 99232 SBSQ HOSP IP/OBS MODERATE 35: CPT | Performed by: INTERNAL MEDICINE

## 2021-04-28 PROCEDURE — 2580000003 HC RX 258: Performed by: INTERNAL MEDICINE

## 2021-04-28 RX ADMIN — PANTOPRAZOLE SODIUM 40 MG: 40 TABLET, DELAYED RELEASE ORAL at 04:15

## 2021-04-28 RX ADMIN — ATORVASTATIN CALCIUM 10 MG: 10 TABLET, FILM COATED ORAL at 20:47

## 2021-04-28 RX ADMIN — ENOXAPARIN SODIUM 40 MG: 40 INJECTION SUBCUTANEOUS at 15:59

## 2021-04-28 RX ADMIN — ASPIRIN 81 MG: 81 TABLET, COATED ORAL at 08:19

## 2021-04-28 RX ADMIN — MULTIPLE VITAMINS W/ MINERALS TAB 1 TABLET: TAB at 08:18

## 2021-04-28 RX ADMIN — SODIUM CHLORIDE, PRESERVATIVE FREE 10 ML: 5 INJECTION INTRAVENOUS at 08:19

## 2021-04-28 RX ADMIN — ENOXAPARIN SODIUM 40 MG: 40 INJECTION SUBCUTANEOUS at 03:37

## 2021-04-28 RX ADMIN — OXYCODONE HYDROCHLORIDE AND ACETAMINOPHEN 1000 MG: 500 TABLET ORAL at 20:47

## 2021-04-28 RX ADMIN — SODIUM CHLORIDE, PRESERVATIVE FREE 10 ML: 5 INJECTION INTRAVENOUS at 20:47

## 2021-04-28 RX ADMIN — DEXAMETHASONE 6 MG: 4 TABLET ORAL at 08:17

## 2021-04-28 RX ADMIN — MULTIPLE VITAMINS W/ MINERALS TAB 1 TABLET: TAB at 20:47

## 2021-04-28 RX ADMIN — Medication 1000 UNITS: at 20:47

## 2021-04-28 RX ADMIN — REMDESIVIR 100 MG: 100 INJECTION, POWDER, LYOPHILIZED, FOR SOLUTION INTRAVENOUS at 12:14

## 2021-04-28 RX ADMIN — Medication 180 UNITS: at 08:18

## 2021-04-28 RX ADMIN — ESCITALOPRAM 20 MG: 20 TABLET, FILM COATED ORAL at 08:19

## 2021-04-28 RX ADMIN — Medication 50 MG: at 08:17

## 2021-04-28 ASSESSMENT — PAIN SCALES - GENERAL
PAINLEVEL_OUTOF10: 0
PAINLEVEL_OUTOF10: 0
PAINLEVEL_OUTOF10: 3

## 2021-04-28 NOTE — PROGRESS NOTES
[address] [place] [address]  INPATIENT PHYSICAL THERAPY  DAILY NOTE  [facility] CCU 3A - 3A-07/007-A    Time In: 5976  Time Out: 1140  Timed Code Treatment Minutes: 28 Minutes  Minutes: 28          Date: 2021  Patient Name: Ras Zuñiga,  Gender:  female        MRN: [id number]  : 1965  (48 y.o.)  Referral Date : 21  Referring Practitioner: Amy Riojas MD  Diagnosis: COVID-19  Additional Pertinent Hx: Per H&P:55 y.o. female who presented to [address] [place] [address] with complaints of being diagnosed with COVID-19 on 21. Pt her brother recently had COVID who she thinks she got it from. Did not get vaccinated. Reports to worsening symptoms of daily fevers, chills, cough, sob, diarrhea, muscle aches, weakness, fatigue. Reports her sob started to get much worse few days ago coming to the ED. Pt reports having had a virtual visit with her PCP who gave her Decadron, of which she took 4 pills. Pt has not been eating well. Prior Level of Function:  Lives With: Family  Type of Home: House  Home Layout: One level  Home Access: Level entry  Home Equipment: Cane, Standard walker, Rolling walker   Bathroom Shower/Tub: Walk-in shower  Bathroom Toilet: Handicap height  Bathroom Equipment: Built-in shower seat, Grab bars in shower  Bathroom Accessibility: Accessible    Receives Help From: Family  ADL Assistance: [address] [place] [address] Avenue: Independent  Homemaking Responsibilities: Yes  Ambulation Assistance: Independent  Transfer Assistance: Independent  Active : Yes  Additional Comments: Patient independent at [facility] with cane intermittently, no AD in home, pt working. She was ordering her groceries ahead of time and having curb side pickup for the time during the pandemic. Restrictions/Precautions:  Restrictions/Precautions: Isolation, Fall Risk  Position Activity Restriction  Other position/activity restrictions: 5L O2 ; 4L O2 ;  5L     SUBJECTIVE: RN approved session.  Pt in recliner upon arrival and agrees to therapy. Pt pleasant and cooperative, did fatigue quickly during session. PAIN: no c/o pain/    Vitals:   Oxygen: 96-98% on 5L nasal cannula  Heart Rate: 75-96- increased with exercises, with rest break did slow back down to 70s    OBJECTIVE:  Bed Mobility:  Not Tested    Transfers:  Sit to Stand: Stand By Assistance  Stand to Sit:Stand By Assistance    Ambulation:  Contact Guard Assistance  Distance: ~20ft in room- pt managing lines  Surface: Level Tile  Device:Cane  Gait Deviations:  Slow Octavia, Decreased Step Length Bilaterally, Decreased Gait Speed, Decreased Heel Strike Bilaterally and slow moving, guarded, no LOB, grossly steady    Balance:  Dynamic Standing Balance: Contact Guard Assistance  Pt reaching over to grab object off of bed outside of LORETO to R side- steady no LOB    Exercise:  Patient was guided in 1 set(s) 15 reps of exercise to both lower extremities. Glut sets, Shoulder horizontal abduction/adduction, Seated marches, Seated heel/toe raises, Long arc quads, Seated abduction/adduction and Scapular retraction. Exercises were completed for increased independence with functional mobility. Functional Outcome Measures: Completed  AM-PAC Inpatient Mobility Raw Score : 19  AM-PAC Inpatient T-Scale Score : 45.44    ASSESSMENT:  Assessment: Patient progressing toward established goals. Activity Tolerance:  Patient tolerance of  treatment: good. Pt tolerated session well. Pt demos decreased endurance, strength and independence with mobility.  Pt will benefit from cont PT at this time     Equipment Recommendations:Equipment Needed: No  Discharge Recommendations:  Continue to assess pending progress, Patient would benefit from continued therapy after discharge    Plan: Times per week: 5xGM  Times per day: Daily  Current Treatment Recommendations: Strengthening, Transfer Training, Endurance Training, Neuromuscular Re-education, Patient/Caregiver Education & Training, Balance Training, Gait Training, Home Exercise Program, Functional Mobility Training, Stair training, Safety Education & Training    Patient Education  Patient Education: Plan of Care, Transfers, Gait, Verbal Exercise Instruction, Home Safety Education    Goals:  Patient goals : go home  Short term goals  Time Frame for Short term goals: at discharge  Short term goal 1: Patient will ambulate 125' with cane and modified independence, including O2 line if applicable to navigate home safely. Short term goal 2: Patient will be educated on HEP to increase strength and activity tolerance  Short term goal 3: Patient will complete sit < > stand with modified independence to stand to ambulate safely. Long term goals  Time Frame for Long term goals : N/A due to short estimated length of stay    Following session, patient left in safe position with all fall risk precautions in place.

## 2021-04-28 NOTE — CARE COORDINATION
4/28/21, 3:01 PM EDT    DISCHARGE ON GOING EVALUATION    [address] day: 5  Location: 3A-07/007-A Reason for admit: COVID-19 [U07.1]   Procedure:   4/24 CTA chest - Multifocal patchy groundglass opacities are seen throughout the lungs bilaterally. This is likely secondary to pneumonia secondary to COVID. No PE. There is mild lymphadenopathy demonstrated at the right hilum which may be reactive in nature. Barriers to Discharge: Hospitalist following. Remains on 5L NC. Possible discharge today pending O2 needs. PT/OT. Decadron. PCP: Jacek Hays MD  Readmission Risk Score: 11%  Patient Goals/Plan/Treatment Preferences:Plans home with brother. Monitor for home O2 needs, prefers [facility] if needed.

## 2021-04-28 NOTE — PROGRESS NOTES
Hospitalist Progress Note    Patient:  Cydney Orf      Unit/Bed:3A-07/007-A    YOB: 1965    MRN: [id number]       Acct: [de-identified]     PCP: Jacek Hays MD    Date of Admission: 4/23/2021    Active Hospital Problems    Diagnosis Date Noted    COVID-19 [U07.1] 04/23/2021    Acute respiratory failure due to COVID-19 [facility]) [U07.1, J96.00] 04/23/2021     Assessment/Plan:     1. Acute Hypoxic Resp. Failure 2/2 COVID-19 PNA: symptoms are > 7 days ago. Presented 85% on RA. + COVID-19 4/17. CXR showing B/L patchy opacities. Pending Procal and D-Dimer. PO Decadron 6 mg daily, Vit C, D, Zinc, Lovenox BID, Acapella and IS. Will hold off on ABx for now given low suspicion. --4/24: requiring more O2, up to 5L NC today. D-Dimer elevated. Pending CTA chest. Will start IV Remdisivir today. Cont PO Decadron 6 mg daily (Day 2), Vit C, D, Zinc, Lovenox BID, Acapella and IS. Procal WNL, no need for ABx.   --4/25: requiring up to 6L NC today, CTA chest negative PE. Cont IV Remdisivir (Day 2), PO Decadron 6 mg (Day 3), Vit C, D, Zinc, Lovenox BID, Acapella and IS.   --4/26: improving, down to 5L NC today. Cont IV Remdisivir (Day 3), PO Decadron 6 mg (Day 4), Vit C, D, Zinc, Lovenox BID, Acapella and IS.   --4/27: about the same today, still on 5L NC. Cont IV Remdisivir (Day 4), PO Decadron 6 mg (Day 5), Vit C, D, Zinc, Lovenox BID, Acapella and IS.   --4/28: still on 5L NC today, improving. Cont IV Remdisivir (Day 5), PO Decadron 6 mg (Day 5), Vit C, D, Zinc, Lovenox BID, Acapella and IS. Possible discharge home tomorrow. Home O2 Eval.                Chief Complaint: Fevers, chills, cough, sob, diarrhea, muscle aches, weakness, fatigue     Hospital Course: 48 y.o. female who presented to [address] [place] [address] with complaints of being diagnosed with COVID-19 on 4/17/21. Pt her brother recently had COVID who she thinks she got it from. Did not get vaccinated.  Reports to worsening symptoms of daily fevers, chills, cough, sob, diarrhea, muscle aches, weakness, fatigue. Reports her sob started to get much worse few days ago coming to the ED. Pt reports having had a virtual visit with her PCP who gave her Decadron, of which she took 4 pills. Pt has not been eating well.      In the ED, pt hypoxic 85% on RA. Placed on 3L NC. Labs unremarkable. CXR showing B/L patchy opacities. Subjective: no acute events overnight. Sitting up in chair, reports feeling much better. Still sob and coughing but improving. No abdominal pain or diarrhea. No chest pain. Tolerating PO intake. Medications:  Reviewed    Infusion Medications    sodium chloride       Scheduled Medications    remdesivir IVPB  100 mg Intravenous Q24H    aspirin  81 mg Oral Daily    pantoprazole  40 mg Oral QAM AC    atorvastatin  10 mg Oral Daily    therapeutic multivitamin-minerals  1 tablet Oral BID    vitamin C  1,000 mg Oral Daily    vitamin E  1,000 Units Oral Daily    dexamethasone  6 mg Oral Daily    zinc sulfate  50 mg Oral Daily    Vitamin D  1,000 Units Oral Daily    sodium chloride flush  5-40 mL Intravenous 2 times per day    enoxaparin  40 mg Subcutaneous Q12H    escitalopram  20 mg Oral Daily     PRN Meds: LORazepam, sodium chloride flush, sodium chloride, promethazine **OR** ondansetron, polyethylene glycol, acetaminophen **OR** acetaminophen      Intake/Output Summary (Last 24 hours) at 4/28/2021 1144  Last data filed at 4/28/2021 2873  Gross per 24 hour   Intake 1610 ml   Output 1800 ml   Net -190 ml       Diet:  DIET GENERAL;  Dietary Nutrition Supplements: Low Calorie High Protein Supplement    Exam:  /85   Pulse 108   Temp 98.5 °F (36.9 °C) (Oral)   Resp 19   Ht 5' 7.5\" (1.715 m)   Wt 243 lb (110.2 kg)   SpO2 94%   BMI 37.50 kg/m²     General appearance: No apparent distress, appears stated age and cooperative on 5L NC.   HEENT: Pupils equal, round, and reactive to light.  Conjunctivae/corneas clear.  Neck: Supple, with full range of motion. No jugular venous distention. Trachea midline. Respiratory:  Normal respiratory effort. Clear to auscultation, bilaterally without Rales/Wheezes/Rhonchi. Cardiovascular: Regular rate and rhythm with normal S1/S2 without murmurs, rubs or gallops. Abdomen: Soft, non-tender, non-distended with normal bowel sounds. Musculoskeletal: passive and active ROM x 4 extremities. Skin: Skin color, texture, turgor normal.  No rashes or lesions. Neurologic:  Neurovascularly intact without any focal sensory/motor deficits. Cranial nerves: II-XII intact, grossly non-focal.  Psychiatric: Alert and oriented, thought content appropriate, normal insight  Capillary Refill: Brisk,< 3 seconds   Peripheral Pulses: +2 palpable, equal bilaterally       Labs:   Recent Labs     04/28/21  0532   WBC 8.2   HGB 13.5   HCT 42.6        Recent Labs     04/28/21  0532      K 4.5      CO2 27   BUN 20   CREATININE 0.5   CALCIUM 8.8     Recent Labs     04/28/21  0532   AST 16   ALT 23   BILIDIR <0.2   BILITOT 0.4   ALKPHOS 42     No results for input(s): INR in the last 72 hours. No results for input(s): Paralee Fontan in the last 72 hours. Urinalysis:      Lab Results   Component Value Date    NITRU NEGATIVE 03/06/2014    BLOODU NEGATIVE 03/06/2014    GLUCOSEU NEGATIVE 03/06/2014       Radiology:   All imaging reviewed     Diet: DIET GENERAL;  Dietary Nutrition Supplements: Low Calorie High Protein Supplement      Code Status: Full Code            Electronically signed by Rick Mcnulty MD on 4/28/2021 at 11:44 AM

## 2021-04-28 NOTE — PLAN OF CARE
by Nohelia Blank RN  Outcome: Ongoing  Note: No c/o muscle cramping this shift     Problem: Risk for Fluid Volume Deficit  Goal: Maintain normal serum potassium, sodium, calcium, phosphorus, and pH  4/28/2021 0017 by Nohelia Blank RN  Outcome: Ongoing  Note: Labs are monitored daily     Problem: Falls - Risk of:  Goal: Will remain free from falls  Description: Will remain free from falls  4/28/2021 0017 by Nohelia Blank RN  Outcome: Ongoing  Note: Patient free of falls this shift. Patient on falling star program. Fall band intact. RN visually checks on patient with hourly rounds. Patient tolerates ambulation each shift. Problem: Falls - Risk of:  Goal: Absence of physical injury  Description: Absence of physical injury  4/28/2021 0017 by Nohelia Blank RN  Outcome: Ongoing  Note: Patient free from injury this shift     Problem: Nutrition  Goal: Optimal nutrition therapy  4/28/2021 0017 by Nohelia Blank RN  Outcome: Ongoing  Note: Patient encouraged to consume % of meals    Care plan reviewed with patient and aunt. Patient and aunt verbalize understanding of the plan of care and contribute to goal setting.

## 2021-04-28 NOTE — PROGRESS NOTES
[address] CCU 3A  Occupational Therapy  Daily Note  Time:   Time In:   Time Out: 08  Timed Code Treatment Minutes: 24 Minutes  Minutes: 24          Date: 2021  Patient Name: Iliana Posey,   Gender: female      Room: [facility]007-A  MRN: [id number]  : 1965  (48 y.o.)  Referring Practitioner: Dr. Pramod Alfonso MD  Diagnosis: COVID-19  Additional Pertinent Hx: Pt presented to [facility] with complaints of being diagnosed with COVID-19 on 21. Pt her brother recently had COVID who she thinks she got it from. Did not get vaccinated. Reports to worsening symptoms of daily fevers, chills, cough, sob, diarrhea, muscle aches, weakness, fatigue. Reports her sob started to get much worse few days ago coming to the ED. Pt reports having had a virtual visit with her PCP who gave her Decadron, of which she took 4 pills. Pt has not been eating well. Restrictions/Precautions:  Restrictions/Precautions: Isolation, Fall Risk  Position Activity Restriction  Other position/activity restrictions: 5L O2 ; 4L O2 ;  5L     SUBJECTIVE: Nurse ok'd session. Patient lying in bed upon arrival. Agreeable to OT session    PAIN: Denies    Vitals: Oxygen:   Patient on 5 L O2 via Nasal Canula  upon arrival to room. Patient O2 sats at 93 %. Upon activity patient fluctuating between 87-94%. Pt requiring rest break(s) to recover. Patient able to demonstrate use of deep breathing with education provided       COGNITION: WFL    ADL:   Grooming: Stand By Assistance. Standing sinkside to complete rinse mouth out with mouthwash and wash hands after toileting tasks  Toileting: Stand By Assistance. For hygiene and clothing management  Toilet Transfer: Stand By Assistance. To/from [facility]. BALANCE:  Sitting Balance:  Supervision. Standing Balance: Stand By Assistance.       BED MOBILITY:  Supine to Sit: Supervision - using side rail, increased time, assist with managing multiple lines    TRANSFERS:  Sit to Stand:  Stand By Assistance. From various surfaces  Stand to Sit: Stand By Assistance. To various surfaces    FUNCTIONAL MOBILITY:  Assistive Device: None  Assist Level:  Stand By Assistance. Distance: To/from bathroom  Slow pace, no LOB noted      ADDITIONAL ACTIVITIES:  Patient completed BUE exercises x12 reps x1 set with chest press, shoulder flexion, shoulder horizontal abduction and elbow flexion/extension using 1# weighted item. Patient completed exercises while standing with SBA for balance. Required 2 seated rest breaks during exercises, 1 seated rest break after exercises. O2 sats fluctuated between 87-94% on 5L. Patient completed exercises to increase activity tolerance and strength required for ADLs and toilet/shower transfers     ASSESSMENT:     Activity Tolerance:  Patient tolerance of  treatment: fair. Discharge Recommendations: Continue to assess pending progress, Home with Home health OT   Equipment Recommendations: Equipment Needed: No  Plan: Times per week: 5x  Specific instructions for Next Treatment: Functional mobility; ADLs and energy conservation techniques; UE exercises and breathing techniques  Current Treatment Recommendations: Functional Mobility Training, Endurance Training, Self-Care / ADL, Strengthening  Plan Comment: Pt would benefit from continued skilled OT sevices when medically stable and discharged from Acute. HHOT recommended. Patient Education  Patient Education: safety with transfers and mobility, deep breathing, BUE exercises, ADL strategies    Goals  Short term goals  Time Frame for Short term goals: By discharge  Short term goal 1: Pt will demonstrate functional mobility walking in the hallway or to/from the bathroom while using a straight cane with supervison while maintaining O2 saturation at or > 90% saturation to prepare for doing homemaking tasks.   Short term goal 2: Pt will complete ADLs while following energy conservation

## 2021-04-28 NOTE — PLAN OF CARE
Problem: Nutrition  Goal: Optimal nutrition therapy  4/28/2021 0943 by Janusz Loo RD, ESTRELLITA  Outcome: Ongoing   Nutrition Problem #1: Inadequate oral intake  Intervention: Food and/or Nutrient Delivery: Continue Current Diet, Continue Oral Nutrition Supplement  Nutritional Goals: Patient will consume 75% or more of meals during LOS.

## 2021-04-29 VITALS
DIASTOLIC BLOOD PRESSURE: 64 MMHG | TEMPERATURE: 98.6 F | BODY MASS INDEX: 36.83 KG/M2 | SYSTOLIC BLOOD PRESSURE: 123 MMHG | RESPIRATION RATE: 10 BRPM | HEART RATE: 94 BPM | HEIGHT: 68 IN | OXYGEN SATURATION: 91 % | WEIGHT: 243 LBS

## 2021-04-29 LAB
ANION GAP SERPL CALCULATED.3IONS-SCNC: 10 MEQ/L (ref 8–16)
BASOPHILS # BLD: 0.3 %
BASOPHILS ABSOLUTE: 0 THOU/MM3 (ref 0–0.1)
BLOOD CULTURE, ROUTINE: NORMAL
BUN BLDV-MCNC: 22 MG/DL (ref 7–22)
CALCIUM SERPL-MCNC: 9 MG/DL (ref 8.5–10.5)
CHLORIDE BLD-SCNC: 103 MEQ/L (ref 98–111)
CO2: 27 MEQ/L (ref 23–33)
CREAT SERPL-MCNC: 0.5 MG/DL (ref 0.4–1.2)
EOSINOPHIL # BLD: 1.9 %
EOSINOPHILS ABSOLUTE: 0.2 THOU/MM3 (ref 0–0.4)
ERYTHROCYTE [DISTWIDTH] IN BLOOD BY AUTOMATED COUNT: 14.6 % (ref 11.5–14.5)
ERYTHROCYTE [DISTWIDTH] IN BLOOD BY AUTOMATED COUNT: 44.1 FL (ref 35–45)
GFR SERPL CREATININE-BSD FRML MDRD: > 90 ML/MIN/1.73M2
GLUCOSE BLD-MCNC: 124 MG/DL (ref 70–108)
HCT VFR BLD CALC: 42.6 % (ref 37–47)
HEMOGLOBIN: 13.6 GM/DL (ref 12–16)
IMMATURE GRANS (ABS): 0.48 THOU/MM3 (ref 0–0.07)
IMMATURE GRANULOCYTES: 5.4 %
LYMPHOCYTES # BLD: 14 %
LYMPHOCYTES ABSOLUTE: 1.2 THOU/MM3 (ref 1–4.8)
MAGNESIUM: 2.2 MG/DL (ref 1.6–2.4)
MCH RBC QN AUTO: 26.4 PG (ref 26–33)
MCHC RBC AUTO-ENTMCNC: 31.9 GM/DL (ref 32.2–35.5)
MCV RBC AUTO: 82.6 FL (ref 81–99)
MONOCYTES # BLD: 5.8 %
MONOCYTES ABSOLUTE: 0.5 THOU/MM3 (ref 0.4–1.3)
NUCLEATED RED BLOOD CELLS: 0 /100 WBC
PLATELET # BLD: 338 THOU/MM3 (ref 130–400)
PLATELET ESTIMATE: ADEQUATE
PMV BLD AUTO: 9.9 FL (ref 9.4–12.4)
POTASSIUM SERPL-SCNC: 4.6 MEQ/L (ref 3.5–5.2)
RBC # BLD: 5.16 MILL/MM3 (ref 4.2–5.4)
SCAN OF BLOOD SMEAR: NORMAL
SEG NEUTROPHILS: 72.6 %
SEGMENTED NEUTROPHILS ABSOLUTE COUNT: 6.5 THOU/MM3 (ref 1.8–7.7)
SODIUM BLD-SCNC: 140 MEQ/L (ref 135–145)
WBC # BLD: 8.9 THOU/MM3 (ref 4.8–10.8)

## 2021-04-29 PROCEDURE — 97535 SELF CARE MNGMENT TRAINING: CPT

## 2021-04-29 PROCEDURE — 94761 N-INVAS EAR/PLS OXIMETRY MLT: CPT

## 2021-04-29 PROCEDURE — 97110 THERAPEUTIC EXERCISES: CPT

## 2021-04-29 PROCEDURE — 36415 COLL VENOUS BLD VENIPUNCTURE: CPT

## 2021-04-29 PROCEDURE — 6360000002 HC RX W HCPCS: Performed by: INTERNAL MEDICINE

## 2021-04-29 PROCEDURE — 83735 ASSAY OF MAGNESIUM: CPT

## 2021-04-29 PROCEDURE — 2700000000 HC OXYGEN THERAPY PER DAY

## 2021-04-29 PROCEDURE — 80048 BASIC METABOLIC PNL TOTAL CA: CPT

## 2021-04-29 PROCEDURE — 2580000003 HC RX 258: Performed by: INTERNAL MEDICINE

## 2021-04-29 PROCEDURE — 99239 HOSP IP/OBS DSCHRG MGMT >30: CPT | Performed by: INTERNAL MEDICINE

## 2021-04-29 PROCEDURE — 85025 COMPLETE CBC W/AUTO DIFF WBC: CPT

## 2021-04-29 PROCEDURE — 6370000000 HC RX 637 (ALT 250 FOR IP): Performed by: INTERNAL MEDICINE

## 2021-04-29 RX ORDER — CHOLECALCIFEROL (VITAMIN D3) 25 MCG
1000 TABLET ORAL DAILY
Qty: 60 TABLET | Refills: 0 | Status: SHIPPED | OUTPATIENT
Start: 2021-04-29

## 2021-04-29 RX ORDER — ZINC SULFATE 50(220)MG
50 CAPSULE ORAL DAILY
Qty: 30 CAPSULE | Refills: 0 | COMMUNITY
Start: 2021-04-30

## 2021-04-29 RX ORDER — DEXAMETHASONE 6 MG/1
6 TABLET ORAL DAILY
Qty: 3 TABLET | Refills: 0 | Status: SHIPPED | OUTPATIENT
Start: 2021-04-30 | End: 2021-05-03

## 2021-04-29 RX ADMIN — Medication 50 MG: at 08:29

## 2021-04-29 RX ADMIN — ENOXAPARIN SODIUM 40 MG: 40 INJECTION SUBCUTANEOUS at 03:43

## 2021-04-29 RX ADMIN — SODIUM CHLORIDE, PRESERVATIVE FREE 10 ML: 5 INJECTION INTRAVENOUS at 08:29

## 2021-04-29 RX ADMIN — Medication 1000 UNITS: at 08:30

## 2021-04-29 RX ADMIN — DEXAMETHASONE 6 MG: 4 TABLET ORAL at 08:29

## 2021-04-29 RX ADMIN — ESCITALOPRAM 20 MG: 20 TABLET, FILM COATED ORAL at 08:29

## 2021-04-29 RX ADMIN — ASPIRIN 81 MG: 81 TABLET, COATED ORAL at 08:29

## 2021-04-29 RX ADMIN — MULTIPLE VITAMINS W/ MINERALS TAB 1 TABLET: TAB at 08:29

## 2021-04-29 RX ADMIN — PANTOPRAZOLE SODIUM 40 MG: 40 TABLET, DELAYED RELEASE ORAL at 06:35

## 2021-04-29 ASSESSMENT — PAIN SCALES - GENERAL
PAINLEVEL_OUTOF10: 0
PAINLEVEL_OUTOF10: 0

## 2021-04-29 NOTE — DISCHARGE SUMMARY
are > 7 days ago. Presented 85% on RA. + COVID-19 4/17. CXR showing B/L patchy opacities. Pending Procal and D-Dimer. PO Decadron 6 mg daily, Vit C, D, Zinc, Lovenox BID, Acapella and IS. Will hold off on ABx for now given low suspicion.      --4/24: requiring more O2, up to 5L NC today. D-Dimer elevated. Pending CTA chest. Will start IV Remdisivir today. Cont PO Decadron 6 mg daily (Day 2), Vit C, D, Zinc, Lovenox BID, Acapella and IS. Procal WNL, no need for ABx.   --4/25: requiring up to 6L NC today, CTA chest negative PE. Cont IV Remdisivir (Day 2), PO Decadron 6 mg (Day 3), Vit C, D, Zinc, Lovenox BID, Acapella and IS.   --4/26: improving, down to 5L NC today. Cont IV Remdisivir (Day 3), PO Decadron 6 mg (Day 4), Vit C, D, Zinc, Lovenox BID, Acapella and IS.   --4/27: about the same today, still on 5L NC. Cont IV Remdisivir (Day 4), PO Decadron 6 mg (Day 5), Vit C, D, Zinc, Lovenox BID, Acapella and IS.   --4/28: still on 5L NC today, improving. Cont IV Remdisivir (Day 5), PO Decadron 6 mg (Day 5), Vit C, D, Zinc, Lovenox BID, Acapella and IS. Possible discharge home tomorrow. Home O2 Eval.   --4/29: weaned down to RA at rest, requiring 4L with activity. Will discharge home on PO Decadron, Vit C, D, and Zinc. Close PCP follow-up. Exam:     Vitals:  Vitals:    04/29/21 0330 04/29/21 0335 04/29/21 0340 04/29/21 0821   BP:   129/79 123/64   Pulse: 52 54 52 94   Resp: 12 13 16 16   Temp:   97.7 °F (36.5 °C) 98.6 °F (37 °C)   TempSrc:   Oral Oral   SpO2: 95% 95% 93% 97%   Weight:       Height:         Weight: Weight: 243 lb (110.2 kg)     24 hour intake/output:    Intake/Output Summary (Last 24 hours) at 4/29/2021 1119  Last data filed at 4/28/2021 1811  Gross per 24 hour   Intake 200 ml   Output 950 ml   Net -750 ml         Labs:  For convenience and continuity at follow-up the following most recent labs are provided:      CBC:    Lab Results   Component Value Date    WBC 8.9 04/29/2021    HGB 13.6 04/29/2021    HCT 42.6 04/29/2021     04/29/2021       Renal:    Lab Results   Component Value Date     04/29/2021    K 4.6 04/29/2021    K 4.1 04/23/2021     04/29/2021    CO2 27 04/29/2021    BUN 22 04/29/2021    CREATININE 0.5 04/29/2021    CALCIUM 9.0 04/29/2021         Significant Diagnostic Studies    Radiology:   CTA CHEST W WO CONTRAST   Final Result   1. Multifocal patchy groundglass opacities are seen throughout the lungs bilaterally. This is likely secondary to pneumonia related to patient's history of Covid. Recommend follow-up to document resolution. 2. There is no CT angiographic evidence of pulmonary embolism. 3. There is mild lymphadenopathy demonstrated at the right hilum which may be reactive in nature. However recommend follow-up to document resolution. **This report has been created using voice recognition software. It may contain minor errors which are inherent in voice recognition technology. **          Final report electronically signed by Dr. Tonie Angel on 4/24/2021 1:42 PM      XR CHEST PORTABLE   Final Result   Bilateral patchy opacities as evidence for viral pneumonia. **This report has been created using voice recognition software. It may contain minor errors which are inherent in voice recognition technology. **      Final report electronically signed by Dr. He Arzola MD on 4/23/2021 12:35 PM             Consults:     [address] [place] [address] TO PHARMACY  IP CONSULT TO DIETITIAN    Disposition:    [x] Home       [] TCU       [] Rehab       [] Psych       [] SNF       [] [place]       [] Other-    Condition at Discharge: Stable    Code Status:  Full Code     Patient Instructions: Activity: activity as tolerated  Diet: DIET GENERAL;  Dietary Nutrition Supplements: Low Calorie High Protein Supplement      Follow-up visits:   No follow-up provider specified.        Discharge Medications: Cate Uriarte 2 Medication Instructions OSN:[id number]    Printed on:04/29/21 1123   Medication Information                      Ascorbic Acid (VITAMIN C) 1000 MG tablet  Take 2,000 mg by mouth nightly             aspirin 81 MG EC tablet  Take 81 mg by mouth daily. b complex vitamins capsule  Take 1 capsule by mouth daily             CALCIUM-MAGNESIUM-ZINC PO  Take 2 tablets by mouth nightly             Cholecalciferol (VITAMIN D) 25 MCG TABS  Take 1 tablet by mouth daily             Coenzyme Q10 (CO Q-10) 400 MG CAPS  Take 100 mg by mouth nightly              dexamethasone (DECADRON) 6 MG tablet  Take 1 tablet by mouth daily for 3 days             escitalopram (LEXAPRO) 10 MG tablet  Take 20 mg by mouth daily             LORazepam (ATIVAN) 0.5 MG tablet  Take 1 tablet by mouth daily as needed for Anxiety             Omega-3 1000 MG CAPS  Take 1 capsule by mouth nightly              omeprazole (PRILOSEC) 40 MG delayed release capsule  Take 40 mg by mouth daily             Probiotic Product (PROBIOTIC PEARLS PO)  Take  by mouth daily. simvastatin (ZOCOR) 20 MG tablet  Take 1 tablet by mouth every evening. therapeutic multivitamin-minerals (THERAGRAN-M) tablet  Take 1 tablet by mouth 2 times daily. triamterene-hydroCHLOROthiazide (MAXZIDE-25) 37.5-25 MG per tablet  Take 0.5 tablets by mouth daily             zinc sulfate (ZINCATE) 220 (50 Zn) MG capsule  Take 1 capsule by mouth daily                 Time Spent on discharge is more than 30 minutes in the examination, evaluation, counseling and review of medications and discharge plan. Signed: Thank you Gabriella Zimmerman MD for the opportunity to be involved in this patient's care.     Electronically signed by Mona Biswas MD on 4/29/2021 at 11:19 AM

## 2021-04-29 NOTE — FLOWSHEET NOTE
Discussed discharge summary with patient. Instructed patient about medications & follow up appointments. Patient denies any additional questions. Patient was discharged with all belongings. No distress noted. Patient discharged to home. Taken down to the vehicle by this RN per wheelchair with home O2 on.

## 2021-04-29 NOTE — PROGRESS NOTES
A home oxygen evaluation has been completed. [x]Patient is an inpatient. It is expected that the patient will be discharged within the next 48 hours. Qualified provider to write order for home prescription if patient qualifies. Social service/care managers will arrange for home oxygen. If patient is active, arrange for Home Medical supplier to assess for Oxygen Conserving Device per pulse oximetry. []Patient is an outpatient. Results will be faxed to the ordering provider. Qualified provider to write order for home prescription if patient qualifies and arranges for home oxygen. Patient was placed on room air for 10 minutes. SpO2 was 87 % on room air at rest. Patients SpO2 was below 89% and qualified for home oxygen. Oxygen was applied at 1 lpm via nasal cannula to maintain a SpO2 of 89% and 2lpm for SpO2 of 91% between 90-92% while at rest. Actual SpO2 was 91 %. Patient can ambulate for exercise flow rate. Patients was ambulated, SpO2 was 91% on 5 lpm to maintain SpO2 between 90-92% while exercising. RN and Dr Adrianne Arroyo notified     Note: For any SpO2 at 93% see policy and procedure for possible qualifications.

## 2021-04-29 NOTE — PROGRESS NOTES
[address] [facility] 3[address]  Occupational Therapy  Daily Note  Time:   Time In: 7276  Time Out: 933  Timed Code Treatment Minutes: 30 Minutes  Minutes: 30          Date: 2021  Patient Name: Iliana Posey,   Gender: female      Room: [facility]007-A  MRN: [id number]  : 1965  (48 y.o.)  Referring Practitioner: Dr. Pramod Alfonso MD  Diagnosis: COVID-19  Additional Pertinent Hx: Pt presented to [address] [place] [address] with complaints of being diagnosed with COVID-19 on 21. Pt her brother recently had COVID who she thinks she got it from. Did not get vaccinated. Reports to worsening symptoms of daily fevers, chills, cough, sob, diarrhea, muscle aches, weakness, fatigue. Reports her sob started to get much worse few days ago coming to the ED. Pt reports having had a virtual visit with her PCP who gave her Decadron, of which she took 4 pills. Pt has not been eating well. Restrictions/Precautions:  Restrictions/Precautions: Isolation, Fall Risk  Position Activity Restriction  Other position/activity restrictions: 5L O2 ; 4L O2 ;  5L     SUBJECTIVE: Nurse ok'd session. Patient seated upright in bed upon arrival. Agreeable to OT session    PAIN: Denies    Vitals: Oxygen:   Patient on 5 L O2 via Nasal Canula  upon arrival to room. Patient O2 sats at 98 %. Decreased to 4L with activity. Upon activity patient maintaining O2 at 89-94 %. Pt requiring rest break(s) to recover. Nurse notified of findings     COGNITION: WFL    ADL:   Grooming: Supervision. Standing to complete oral care and wash hands after toileting tasks  Toileting: Supervision. For hygiene and clothing management  Toilet Transfer: Supervision. To/from STS. BALANCE:  Sitting Balance:  Independent. Standing Balance: Supervision. BED MOBILITY:  Supine to Sit: Modified Independent - using side rail    TRANSFERS:  Sit to Stand:  Supervision. From various surfaces  Stand to Sit: Supervision.  To various surfaces    FUNCTIONAL MOBILITY:  Assistive Device: Straight Cane  Assist Level:  Stand By Assistance. Distance: To and from bathroom and approx 120 ft x1 trial  Slow pace, no LOB noted. Required lengthy seated rest break after mobility with fatigue and SOB noted. Patient O2 sats maintained between 89-94% on 4L      ASSESSMENT:     Activity Tolerance:  Patient tolerance of  treatment: fair. Discharge Recommendations: Home with Home health OT   Equipment Recommendations: Equipment Needed: No  Plan: Times per week: 5x  Specific instructions for Next Treatment: Functional mobility; ADLs and energy conservation techniques; UE exercises and breathing techniques  Current Treatment Recommendations: Functional Mobility Training, Endurance Training, Self-Care / ADL, Strengthening  Plan Comment: Pt would benefit from continued skilled OT sevices when medically stable and discharged from Acute. HHOT recommended. Patient Education  Patient Education: safety with transfers and mobility, deep breathing, ADL strategies    Goals  Short term goals  Time Frame for Short term goals: By discharge  Short term goal 1: Pt will demonstrate functional mobility walking in the hallway or to/from the bathroom while using a straight cane with supervison while maintaining O2 saturation at or > 90% saturation to prepare for doing homemaking tasks. Short term goal 2: Pt will complete ADLs while following energy conservation techniques with SBA and verbal cues as needed for pursed lip breathing or pacing to increase her endurance and independence with self care. Short term goal 3: Pt will complete BUE moderate resistance exercises while following any handout needed and following proper breathing technique to increase her endurance and strength for ease of doing homemaking and returning to workplace.   Short term goal 4: Pt will complete lower body ADLs while using any adaptations needed with supervision to increase her independence with self care. Following session, patient left in safe position with all fall risk precautions in place.

## 2021-04-29 NOTE — CARE COORDINATION
4/29/21, 10:23 AM EDT    DISCHARGE ON [facility] day: 6  Location: -07/007-A Reason for admit: COVID-19 [U07.1]   Procedure:   4/24 CTA chest - Multifocal patchy groundglass opacities are seen throughout the lungs bilaterally. This is likely secondary to pneumonia secondary to COVID. No PE. There is mild lymphadenopathy demonstrated at the right hilum which may be reactive in nature.   Barriers to Discharge: Hospitalist following. Remains on 5L NC, sats range from 90-97%. Vit C, D, E, zinc. Discharge pending respiratory improvement. PCP: Amna Leggett MD  Readmission Risk Score: 11%  Patient Goals/Plan/Treatment Preferences: Plans home with brother. Prefers [facility] DME for home O2.

## 2021-04-29 NOTE — PLAN OF CARE
Problem: Airway Clearance - Ineffective  Goal: Achieve or maintain patent airway  Outcome: Ongoing  Note: She is currently on 5 liters of O2 per nasal cannula. She did walk in the [place] today and reports that it is getting better than it was. Problem: Gas Exchange - Impaired  Goal: Absence of hypoxia  Outcome: Ongoing  Goal: Promote optimal lung function  Outcome: Ongoing     Problem: Breathing Pattern - Ineffective  Goal: Ability to achieve and maintain a regular respiratory rate  Outcome: Ongoing  Note: Lung sounds, pulse ox, and breathing monitored throughout shift. Discussed correct technique and importance of deep breathing & coughing exercises with patient. Patient able to demonstrate cough & deep breathing exercises to nurse. Problem: Body Temperature -  Risk of, Imbalanced  Goal: Ability to maintain a body temperature within defined limits  Outcome: Ongoing  Note: Afebrile so far this shift. Goal: Will regain or maintain usual level of consciousness  Outcome: Ongoing  Note: She is alert & oriented. Problem: Isolation Precautions - Risk of Spread of Infection  Goal: Prevent transmission of infection  Outcome: Ongoing  Note: Proper contact isolation precautions followed including (gown, gloves, mask, faceshield and handwashing) used to prevent the spread of infection. Problem: Nutrition Deficits  Goal: Optimize nutritional status  4/29/2021 1103 by Geri Ingram RN  Outcome: Ongoing  Note: She reports that her appetite is getting better. Problem: Risk for Fluid Volume Deficit  Goal: Maintain normal heart rhythm  4/29/2021 1103 by Geri Ingram RN  Outcome: Ongoing  Note: Ongoing assessment & interventions provided throughout shift. Patient on continuous telemetry monitoring, heart tones, vitals & pulses checked at least 3 times per shift & PRN. Vitals within acceptable limits. Peripheral pulses palpable.        Problem: Risk for Fluid Volume Deficit  Goal: Maintain normal serum

## 2021-04-30 ENCOUNTER — CARE COORDINATION (OUTPATIENT)
Dept: CASE MANAGEMENT | Age: 56
End: 2021-04-30

## 2021-04-30 LAB
BLOOD CULTURE, ROUTINE: ABNORMAL
BLOOD CULTURE, ROUTINE: ABNORMAL
ORGANISM: ABNORMAL

## 2021-04-30 NOTE — CARE COORDINATION
and does not have any further questions or concerns at this time. Were discharge instructions available to patient? Yes. Reviewed appropriate site of care based on symptoms and resources available to patient including: PCP. The patient agrees to contact the PCP office for questions related to their healthcare. Medication reconciliation was performed with patient, who verbalizes understanding of administration of home medications. Advised obtaining a 90-day supply of all daily and as-needed medications. Covid Risk Education    Patient has following risk factors of: acute respiratory failure. Education provided regarding infection prevention, and signs and symptoms of COVID-19 and when to seek medical attention with patient who verbalized understanding. Discussed exposure protocols and quarantine From CDC: Are you at higher risk for severe illness?   and given an opportunity for questions and concerns. The patient agrees to contact the COVID-19 hotline [phone] or PCP office for questions related to COVID-19. For more information on steps you can take to protect yourself, see CDC's How to Protect Yourself     Was patient discharged with a pulse oximeter? No and PT states she ordered one and should be there today; Reviewed appropriate SP02 LEVELS Discussed and confirmed pulse oximeter discharge instructions and when to notify provider or seek emergency care. Patient/family/caregiver given information for [employer] and agrees to enroll no  Patient's preferred e-mail: declines  Patient's preferred phone number: declines    Discussed follow-up appointments. If no appointment was previously scheduled, appointment scheduling offered: Yes. Is follow up appointment scheduled within 7 days of discharge? Yes  Non-[facility] follow up appointment(s):  5/14/ 2021 WITH PCP    Plan for follow-up call in 5-7 days based on severity of symptoms and risk factors.   Plan for next call: symptom management-shortness of breath  CTN provided contact information for future needs. Non-face-to-face services provided:      Care Transitions 24 Hour Call    Do you have any ongoing symptoms?: No  Do you have a copy of your discharge instructions?: Yes  Do you have all of your prescriptions and are they filled?: No (Comment: Children are picking up today from [facility])  Have you been contacted by a [address]?: No  Have you scheduled your follow up appointment?: Yes  How are you going to get to your appointment?: Car - family or friend to transport  Were you discharged with any Home Care or Post Acute Services: No  Do you feel like you have everything you need to keep you well at home?: Yes  Care Transitions Interventions       Pt calls write back after VM left. States she is doing ok, glad to be home. She denies shortness of breath today but is still utilizing oxygen. 2lnc at rest and 4lnc with exertion. She wants to know how long for that. We reviewed that she is getting a SPO2 delivered today and she needs to monitor her oxygen saturation with resting and exertion and see what she is at first before titrating down from oxygen. Pt verbalized understanding. Pt does have a follow up with PCP  For May 14 but told to call if needed sooner visit. Children are picking up RX from [facility] today but essentially she already had everything more or less refills of zinc and decadron. Follow Up  No future appointments.     [place] Samuel RN

## 2021-05-12 ENCOUNTER — CARE COORDINATION (OUTPATIENT)
Dept: CASE MANAGEMENT | Age: 56
End: 2021-05-12

## 2021-05-28 NOTE — PROGRESS NOTES
Comprehensive Nutrition Assessment    Type and Reason for Visit:  Reassess    Nutrition Recommendations/Plan:   Recommend continue current diet, ONS (Ensure High Protein TID) and vitamin supplementation as ordered. Nutrition Assessment:    Pt. Improving from nutritional standpoint AEB improving appetite/ intake, acceptance of ONS. At risk for further nutrition compromise r/t admit with COVID, poor appetite for one week prior to admission and underlying medical condition (hx depression, HTN). Nutrition recommendations/interventions as per above.     Malnutrition Assessment:  Malnutrition Status:  Insufficient data(covid status)    Context:  Acute Illness       Estimated Daily Nutrient Needs:  Energy (kcal):  4413-3782 kcals (30-32) LATE PHASE; Weight Used for Energy Requirements:  (63kgm)     Protein (g):  126 grams (2); Weight Used for Protein Requirements:  Ideal(63kgm)        Nutrition Related Findings:  Patient diagnosed with COVID 4/17/21, reported earlier in admit of decreased appetite for one week prior to admission due to shortness of breath/ not feeling well, reports today that her appetite is improving, intake close to 100% of meals and good acceptance of Ensure supplement, tolerating po well; had BM on 4/26; MVI, vitamin C, D, E, zinc, decadron; glucose 141, BUN 20, Creatinine 0.5, potassium 4.5      Wounds:  None       Current Nutrition Therapies:    DIET GENERAL;  Dietary Nutrition Supplements: Low Calorie High Protein Supplement    Anthropometric Measures:  · Height: 5' 7.5\" (171.5 cm)  · Current Body Weight: 243 lb (110.2 kg)(4/25; trace edema)   · Admission Body Weight: 244 lb (110.7 kg)(4/23; no edema)    · Usual Body Weight: 244 lb (110.7 kg)(per patient; no weight records per EMR)     · Ideal Body Weight: 138 lbs;   · BMI: 37.5  · BMI Categories: Obese Class 2 (BMI 35.0 -39.9)       Nutrition Diagnosis:   · Inadequate oral intake related to inadequate protein-energy intake as evidenced by I reviewed the H&P, I examined the patient, and there are no changes in the patient's condition.   poor intake prior to admission      Nutrition Interventions:   Food and/or Nutrient Delivery:  Continue Current Diet, Continue Oral Nutrition Supplement  Nutrition Education/Counseling:  Education initiated(encouraged intake at best effort, use of ONS)   Coordination of Nutrition Care:  Continue to monitor while inpatient    Goals:  Patient will consume 75% or more of meals during LOS. Nutrition Monitoring and Evaluation:   Behavioral-Environmental Outcomes:  None Identified   Food/Nutrient Intake Outcomes:  Food and Nutrient Intake, Supplement Intake  Physical Signs/Symptoms Outcomes:  Biochemical Data, Skin, Weight, Fluid Status or Edema     Discharge Planning:     Too soon to determine     Electronically signed by Reg Ahuja RD, LD on 4/28/21 at 9:43 AM EDT    Contact: [phone]

## 2021-12-28 ENCOUNTER — HOSPITAL ENCOUNTER (OUTPATIENT)
Dept: MRI IMAGING | Age: 56
Discharge: HOME | End: 2021-12-28
Payer: COMMERCIAL

## 2021-12-28 DIAGNOSIS — G44.89 HEADACHE SYNDROME: ICD-10-CM

## 2021-12-28 DIAGNOSIS — R42 DIZZINESS: ICD-10-CM

## 2021-12-28 DIAGNOSIS — H55.89 OTHER IRREGULAR EYE MOVEMENTS: ICD-10-CM

## 2021-12-28 PROCEDURE — 70551 MRI BRAIN STEM W/O DYE: CPT

## 2022-01-12 ENCOUNTER — VIRTUAL VISIT (OUTPATIENT)
Dept: NEUROLOGY | Age: 57
End: 2022-01-12
Payer: COMMERCIAL

## 2022-01-12 DIAGNOSIS — R90.82 WHITE MATTER ABNORMALITY ON MRI OF BRAIN: ICD-10-CM

## 2022-01-12 DIAGNOSIS — H53.9 VISUAL DISTURBANCE: ICD-10-CM

## 2022-01-12 DIAGNOSIS — G44.89 OTHER HEADACHE SYNDROME: Primary | ICD-10-CM

## 2022-01-12 PROCEDURE — G8428 CUR MEDS NOT DOCUMENT: HCPCS | Performed by: PSYCHIATRY & NEUROLOGY

## 2022-01-12 PROCEDURE — 99205 OFFICE O/P NEW HI 60 MIN: CPT | Performed by: PSYCHIATRY & NEUROLOGY

## 2022-01-12 PROCEDURE — 3017F COLORECTAL CA SCREEN DOC REV: CPT | Performed by: PSYCHIATRY & NEUROLOGY

## 2022-01-12 RX ORDER — UBROGEPANT 50 MG/1
50 TABLET ORAL PRN
Qty: 10 TABLET | Refills: 2 | Status: SHIPPED | OUTPATIENT
Start: 2022-01-12

## 2022-01-12 NOTE — PATIENT INSTRUCTIONS
1. Sed rate  2. CRP   3. Copper level  4. Vitamin B1  5. Will order connective tissue screen including SHAW, rheumatoid factor, antidouble-stranded DNA, anti-SSA, anti-SSB . 6. Start Ubrelvy 50 mg daily as needed for breakthrough headache  7. Repeat MRI brain W/WO contrast in June 2022, please call us to schedule. 8. Referral to ophthalmology re: left eye visual distrubance  9. Keep headache diary  10. Follow up in 1 month or sooner if needed. 11. Call if any questions or concerns.

## 2022-01-12 NOTE — LETTER
[address] [facility]  [facility] [address] [facility] [address]  Phone: [phone]  Fax: [phone]           Esdras Atwood MD      January 26, 2022     Patient: Goran Loera   MR Number: [id number]   YOB: 1965   Date of Visit: 1/12/2022       Dear Dr. Loree Leon:    Thank you for referring Osvaldo Choi to me for evaluation/treatment. Below are the relevant portions of my assessment and plan of care. If you have questions, please do not hesitate to call me. I look forward to following Oliver Panda along with you.     Sincerely,        Esdras Atwood MD    CC providers:  Claudy Araiza MD  [address] [place] [id number]  Via Fax: [phone]

## 2022-01-25 ENCOUNTER — HOSPITAL ENCOUNTER (OUTPATIENT)
Age: 57
Discharge: HOME | End: 2022-01-25
Payer: COMMERCIAL

## 2022-01-25 DIAGNOSIS — G44.89 OTHER HEADACHE SYNDROME: ICD-10-CM

## 2022-01-25 DIAGNOSIS — H53.9 VISUAL DISTURBANCE: ICD-10-CM

## 2022-01-25 DIAGNOSIS — R90.82 WHITE MATTER ABNORMALITY ON MRI OF BRAIN: ICD-10-CM

## 2022-01-25 LAB
C-REACTIVE PROTEIN: < 0.3 MG/DL (ref 0–1)
RHEUMATOID FACTOR: < 10 IU/ML (ref 0–13)
SEDIMENTATION RATE, ERYTHROCYTE: 1 MM/HR (ref 0–20)

## 2022-01-25 PROCEDURE — 86255 FLUORESCENT ANTIBODY SCREEN: CPT

## 2022-01-25 PROCEDURE — 86430 RHEUMATOID FACTOR TEST QUAL: CPT

## 2022-01-25 PROCEDURE — 86225 DNA ANTIBODY NATIVE: CPT

## 2022-01-25 PROCEDURE — 86235 NUCLEAR ANTIGEN ANTIBODY: CPT

## 2022-01-25 PROCEDURE — 86140 C-REACTIVE PROTEIN: CPT

## 2022-01-25 PROCEDURE — 82525 ASSAY OF COPPER: CPT

## 2022-01-25 PROCEDURE — 84425 ASSAY OF VITAMIN B-1: CPT

## 2022-01-25 PROCEDURE — 86038 ANTINUCLEAR ANTIBODIES: CPT

## 2022-01-25 PROCEDURE — 85651 RBC SED RATE NONAUTOMATED: CPT

## 2022-01-25 PROCEDURE — 36415 COLL VENOUS BLD VENIPUNCTURE: CPT

## 2022-01-26 NOTE — PROGRESS NOTES
2022    TELEHEALTH EVALUATION -- Audio/Visual (During KHCYI-40 public health emergency)    HPI:    Una Torres (:  1965) has requested an audio/video evaluation for the following concern(s): headache for the past 2 months. Location of her pain was left side of head and radiating into her neck. She reports twitching of her left eye and blurred vision in the left eye. No jaw claudication. No right sided symptoms. She denies any light or sound sensitivity. but she can be nauseated. Frequency of headache is daily. She has tried over the counter medications without relief. She has not been to her eye doctor since the onset of her symptoms. Her sleep is good she wakes up feeling well rested. No history of head injury. No history of alcohol abuse. MRI brain done 21 showed no evidence of acute intracranial abnormality. Mild severity chronic microvascular angiopathy superimposed on mild volume loss which has worsened in the interval since . History provided by patient. Review of Systems ______________  Eyes: Negative.  ____  Respiratory: Negative.  ____  Cardiovascular: Negative.  ____  Gastrointestinal: Negative.  ______________________________________      Prior to Visit Medications    Medication Sig Taking?  Authorizing Provider   zinc sulfate (ZINCATE) 220 (50 Zn) MG capsule Take 1 capsule by mouth daily  Lindsay Ribera MD   Cholecalciferol (VITAMIN D) 25 MCG TABS Take 1 tablet by mouth daily  Lindsay Ribera MD   escitalopram (LEXAPRO) 10 MG tablet Take 20 mg by mouth daily  Historical Provider, MD   triamterene-hydroCHLOROthiazide (MAXZIDE-25) 37.5-25 MG per tablet Take 0.5 tablets by mouth daily  Historical Provider, MD   Ascorbic Acid (VITAMIN C) 1000 MG tablet Take 2,000 mg by mouth nightly  Historical Provider, MD   CALCIUM-MAGNESIUM-ZINC PO Take 2 tablets by mouth nightly  Historical Provider, MD   b complex vitamins capsule Take 1 capsule by mouth daily  Historical Provider, MD   Omega-3 1000 MG CAPS Take 1 capsule by mouth nightly   Historical Provider, MD   omeprazole (PRILOSEC) 40 MG delayed release capsule Take 40 mg by mouth daily  Historical Provider, MD   LORazepam (ATIVAN) 0.5 MG tablet Take 1 tablet by mouth daily as needed for Anxiety  Kahlil Saab MD   simvastatin (ZOCOR) 20 MG tablet Take 1 tablet by mouth every evening. Kahlil Saab MD   Coenzyme Q10 (CO Q-10) 400 MG CAPS Take 100 mg by mouth nightly   Historical Provider, MD   Probiotic Product (PROBIOTIC PEARLS PO) Take  by mouth daily. Historical Provider, MD   therapeutic multivitamin-minerals (THERAGRAN-M) tablet Take 1 tablet by mouth 2 times daily. Historical Provider, MD   aspirin 81 MG EC tablet Take 81 mg by mouth daily. Historical Provider, MD       Social History     Tobacco Use    Smoking status: Former Smoker     Packs/day: 0.25     Years: 25.00     Pack years: 6.25     Types: Cigarettes     Quit date: 2014     Years since quittin.1    Smokeless tobacco: Never Used    Tobacco comment: working on quitting.     Vaping Use    Vaping Use: Never used   Substance Use Topics    Alcohol use: No    Drug use: No        Past Medical History:   Diagnosis Date    Anxiety     Depression     Hyperlipidemia     Hypertension     Smoker     Wears dentures     pt has implants to hold dentures in place   ,   Past Surgical History:   Procedure Laterality Date     SECTION      x 3    DENTAL SURGERY      implants    EGD COLONOSCOPY Left 10/21/2019    EGD ESOPHAGOGASTRODUODENOSCOPY DILATATION performed by Baylee Rolon MD at [facility]    ENDOSCOPY, COLON, DIAGNOSTIC      HYSTERECTOMY      endometriosis    KNEE ARTHROSCOPY Bilateral    ,   Social History     Tobacco Use    Smoking status: Former Smoker     Packs/day: 0.25     Years: 25.00     Pack years: 6.25     Types: Cigarettes     Quit date: 2014     Years since quittin.1    Smokeless tobacco: Never Used    Tobacco comment: working on quitting. Vaping Use    Vaping Use: Never used   Substance Use Topics    Alcohol use: No    Drug use: No   ,   Family History   Problem Relation Age of Onset    Diabetes Father     Cancer Maternal Grandfather     Diabetes Paternal Grandmother      MRI brain done 12-28-21:  Narrative   PROCEDURE: MRI BRAIN WO CONTRAST       INDICATION:  Headache syndrome, Other irregular eye movements, Dizziness. Left-sided head pain with left eye twitching and blurred vision. COMPARISON: MR brain dated 3/14/2014. TECHNIQUE: Multiplanar and multiple spin echo MRI images were obtained of the brain without contrast.       FINDINGS:   There is mild central volume loss which has worsened in the interval since 2014. There are mild scattered focal areas of T2/FLAIR prolongation in the periventricular, subcortical deep white matter, slightly worsened compared to prior exam. No intra or    extra-axial mass is identified. No focal areas of restricted diffusion are present. The major vascular flow voids appear patent. Orbits are unremarkable. Paranasal sinuses and mastoid air cells are clear. Impression       1. No evidence of acute intracranial abnormality. 2. Mild severity chronic microvascular angiopathy superimposed on mild volume loss which has worsened in the interval since 2014. **This report has been created using voice recognition software. It may contain minor errors which are inherent in voice recognition technology. **       Final report electronically signed by Dr. Clif Gallego MD on 12/29/2021 9:04 AM           PHYSICAL EXAMINATION:  [ INSTRUCTIONS:  \"[x]\" Indicates a positive item  \"[]\" Indicates a negative item  -- DELETE ALL ITEMS NOT EXAMINED]  Vital Signs: (As obtained by patient/caregiver or practitioner observation)    Blood pressure-  Heart rate-    Respiratory rate-    Temperature-  Pulse oximetry-     Constitutional: [x] Appears well-developed and well-nourished [x] No apparent distress      [] Abnormal-   Mental status  [x] Alert and awake  [x] Oriented to person/place/time [x]Able to follow commands      Eyes:  EOM    [x]  Normal  [] Abnormal-  Sclera  [x]  Normal  [] Abnormal -         Discharge [x]  None visible  [] Abnormal -    HENT:   [x] Normocephalic, atraumatic. [] Abnormal   [x] Mouth/Throat: Mucous membranes are moist.     External Ears [x] Normal  [] Abnormal-     Neck: [x] No visualized mass     Pulmonary/Chest: [x] Respiratory effort normal.  [x] No visualized signs of difficulty breathing or respiratory distress        [] Abnormal-      Musculoskeletal:   [] Normal gait with no signs of ataxia         [x] Normal range of motion of neck        [] Abnormal-       Neurological:        [x] No Facial Asymmetry (Cranial nerve 7 motor function) (limited exam to video visit)          [x] No gaze palsy        [] Abnormal-         Skin:        [x] No significant exanthematous lesions or discoloration noted on facial skin         [] Abnormal-            Psychiatric:       [x] Normal Affect [x] No Hallucinations        [] Abnormal-     Other pertinent observable physical exam findings-     ASSESSMENT/PLAN:  1. Other headache syndrome  2. Visual disturbance  3. White matter abnormality on MRI of brain    This is a 60-year-old female who presents with headache that has been ongoing for the past 2 months. She has associated twitching and blurred vision of the her left eye. No jaw claudication. I reviewed the MRI Brain and agree with interpretation, I also reviewed the patient pertinent labs and records in the EHR and from other providers. MRI brain done 12/28/2021 showed no acute intracranial abnormality. Mild severity chronic microvascular angiopathy superimposed on mild volume loss which is worsened in the interval since 2014. The patient was counseled about her symptoms, and work-up recommended.   We will arrange for her to undergo lab work checking inflammatory markers, vitamin levels, as well as connective tissue screening. She would benefit from formal evaluation with ophthalmology regarding the left eye visual disturbance and we will start her on Ubrelvy 50 mg daily as needed for breakthrough headache. We will repeat her MRI brain with and without contrast in June 2022 to evaluate for stability  After detailed discussion with the patient we agreed on the following plan. Plan:  1. Sed rate  2. CRP   3. Copper level  4. Vitamin B1  5. Will order connective tissue screen including SHAW, rheumatoid factor, antidouble-stranded DNA, anti-SSA, anti-SSB. 6. Start Ubrelvy 50 mg daily as needed for breakthrough headache  7. Repeat MRI brain W/WO contrast in June 2022, please call us to schedule. 8. Referral to ophthalmology re: left eye visual distrubance  9. Keep headache diary  10. Follow up in 1 month or sooner if needed. 11. Call if any questions or concerns. Rukhsana Jauregui, was evaluated through a synchronous (real-time) audio-video encounter. The patient (or guardian if applicable) is aware that this is a billable service. Verbal consent to proceed has been obtained within the past 12 months. The visit was conducted pursuant to the emergency declaration under the [address] [place] [address] authority and the [employer] General Act. Patient identification was verified, and a caregiver was present when appropriate. The patient was located in a state where the provider was credentialed to provide care. Total time spent on this encounter: 67 minutes    --Charleen Castellanos MD  on 1/12/2022 at 2:49 PM    An electronic signature was used to authenticate this note.

## 2022-01-28 LAB
ANA SCREEN: NORMAL
ANCA IFA PATTERN: NORMAL
ANCA IFA TITER: NORMAL
ANTI SSA: NORMAL
ANTI SSB: 2 AU/ML (ref 0–40)
COPPER: 113 UG/DL (ref 80–155)
DSDNA ANTIBODY: NORMAL

## 2022-01-30 LAB — VITAMIN B1 WHOLE BLOOD: 181 NMOL/L (ref 70–180)

## 2022-02-14 ENCOUNTER — OFFICE VISIT (OUTPATIENT)
Dept: NEUROLOGY | Age: 57
End: 2022-02-14
Payer: COMMERCIAL

## 2022-02-14 VITALS
BODY MASS INDEX: 41.22 KG/M2 | HEIGHT: 68 IN | WEIGHT: 272 LBS | SYSTOLIC BLOOD PRESSURE: 138 MMHG | DIASTOLIC BLOOD PRESSURE: 96 MMHG | HEART RATE: 70 BPM

## 2022-02-14 DIAGNOSIS — H53.9 VISUAL DISTURBANCE: ICD-10-CM

## 2022-02-14 DIAGNOSIS — G44.89 OTHER HEADACHE SYNDROME: Primary | ICD-10-CM

## 2022-02-14 DIAGNOSIS — R90.82 WHITE MATTER ABNORMALITY ON MRI OF BRAIN: ICD-10-CM

## 2022-02-14 PROCEDURE — 3017F COLORECTAL CA SCREEN DOC REV: CPT | Performed by: PSYCHIATRY & NEUROLOGY

## 2022-02-14 PROCEDURE — G8427 DOCREV CUR MEDS BY ELIG CLIN: HCPCS | Performed by: PSYCHIATRY & NEUROLOGY

## 2022-02-14 PROCEDURE — G8484 FLU IMMUNIZE NO ADMIN: HCPCS | Performed by: PSYCHIATRY & NEUROLOGY

## 2022-02-14 PROCEDURE — 99214 OFFICE O/P EST MOD 30 MIN: CPT | Performed by: PSYCHIATRY & NEUROLOGY

## 2022-02-14 PROCEDURE — G8417 CALC BMI ABV UP PARAM F/U: HCPCS | Performed by: PSYCHIATRY & NEUROLOGY

## 2022-02-14 PROCEDURE — 1036F TOBACCO NON-USER: CPT | Performed by: PSYCHIATRY & NEUROLOGY

## 2022-02-14 NOTE — PATIENT INSTRUCTIONS
1. B12, B6, Folate. 2. Ubrelvy 50 mg daily as needed for breakthrough headache  3. Repeat MRI brain W/WO contrast in June 2022, please call us to schedule. 4. Referral to ophthalmology re: left eye visual distrubance  5. Keep headache diary  6. Follow up in 1 month or sooner if needed. 7. Call if any questions or concerns.

## 2022-02-14 NOTE — PROGRESS NOTES
Disp: , Rfl:     Probiotic Product (PROBIOTIC PEARLS PO), Take  by mouth daily. , Disp: , Rfl:     therapeutic multivitamin-minerals (THERAGRAN-M) tablet, Take 1 tablet by mouth 2 times daily. , Disp: , Rfl:     aspirin 81 MG EC tablet, Take 81 mg by mouth daily. , Disp: , Rfl:     I reviewed the past medical history, allergies, medications, social history and family history. PE:   Vitals:    02/14/22 1240   BP: (!) 138/96   Pulse: 70   Weight: 272 lb (123.4 kg)   Height: 5' 7.5\" (1.715 m)     General Appearance:  alert and cooperative, she is overweight and wearing a mask. Skin:  Skin color, texture, turgor normal. No rashes or lesions. Gen: NAD, Language is Intact. Skin: no rash, lesion, moist to touch. warm  Head: no rash, no icterus  Neck: There is no carotid bruits. The Neck is supple. There is no neck lymphadenopathy. Neuro: CN 2-12 grossly intact with no focal deficits. Power 5/5 Throughout symmetric, Reflexes are +2 symmetric. Long tracts are intact. Cerebellar exam is Intact. Sensory exam is intact to light touch. Gait is intact. Musculoskeletal:  Has no hand arthritis, no limitation of ROM in any of the four extremities.   Lower extremities no edema          DATA:        Results for orders placed or performed during the hospital encounter of 01/25/22   SHAW Screen with Reflex   Result Value Ref Range    SHAW SCREEN None Detected None Detected   Anti SSA   Result Value Ref Range    Anti SSA SEE BELOW    Anti SSB   Result Value Ref Range    Anti SSB 2 0 - 40 AU/mL   Anti-DNA Antibody, Double-Stranded   Result Value Ref Range    dsDNA Ab SEE BELOW    Rheumatoid Factor   Result Value Ref Range    Rheumatoid Factor < 10 0 - 13 IU/mL   Anti-Neutrophilic Cytoplasmic Antibody   Result Value Ref Range    ANCA IFA Titer < 1:20 <1:20    ANCA IFA Pattern None Detected None Detected   Sedimentation Rate   Result Value Ref Range    Sed Rate 1 0 - 20 mm/hr   C-Reactive Protein   Result Value Ref Range    CRP < 0.30 0.00 - 1.00 mg/dl   Copper, Serum   Result Value Ref Range    Copper 113.0 80.0 - 155.0 ug/dL   Vitamin B1   Result Value Ref Range    Vitamin B1,Whole Blood 181 (H) 70 - 180 nmol/L            MRI BRAIN WO CONTRAST    Narrative  PROCEDURE: MRI BRAIN WO CONTRAST    INDICATION:  Headache syndrome, Other irregular eye movements, Dizziness. Left-sided head pain with left eye twitching and blurred vision. COMPARISON: MR brain dated 3/14/2014. TECHNIQUE: Multiplanar and multiple spin echo MRI images were obtained of the brain without contrast.    FINDINGS:  There is mild central volume loss which has worsened in the interval since 2014. There are mild scattered focal areas of T2/FLAIR prolongation in the periventricular, subcortical deep white matter, slightly worsened compared to prior exam. No intra or  extra-axial mass is identified. No focal areas of restricted diffusion are present. The major vascular flow voids appear patent. Orbits are unremarkable. Paranasal sinuses and mastoid air cells are clear. Impression  1. No evidence of acute intracranial abnormality. 2. Mild severity chronic microvascular angiopathy superimposed on mild volume loss which has worsened in the interval since 2014. **This report has been created using voice recognition software. It may contain minor errors which are inherent in voice recognition technology. **    Final report electronically signed by Dr. Kaye Maxwell MD on 12/29/2021 9:04 AM             Assessment:     Diagnosis Orders   1. Other headache syndrome     2. Visual disturbance     3. White matter abnormality on MRI of brain        This is a 81-year-old female who presents with headache that has been ongoing for the past 4 months. She has associated twitching and blurred vision of the her left eye. No jaw claudication.   The patient underwent connective tissue screening, copper level, CRP, vitamin B1, sed rate that were all normal.I reviewed the patient pertinent labs and records in the EHR and from other providers. She did not start the Ubrelvy yet. After detailed discussion with patient we agreed on the following plan. Plan:  1. B12, B6, Folate. 2. Ubrelvy 50 mg daily as needed for breakthrough headache  3. Repeat MRI brain W/WO contrast in June 2022, please call us to schedule. 4. Referral given to ophthalmology re: left eye visual distrubance  5. Keep headache diary  6. Follow up in 1 month or sooner if needed. 7. Call if any questions or concerns.       Total time 34 min    Anjelica Devi MD

## 2022-02-15 LAB
FOLATE: >25 NG/ML
VITAMIN B-12: 670 PG/ML (ref 180–914)
VITAMIN B6: 135 NMOL/L (ref 20–125)

## 2022-05-06 ENCOUNTER — OFFICE VISIT (OUTPATIENT)
Dept: NEUROLOGY | Age: 57
End: 2022-05-06
Payer: COMMERCIAL

## 2022-05-06 VITALS
SYSTOLIC BLOOD PRESSURE: 132 MMHG | WEIGHT: 266 LBS | OXYGEN SATURATION: 98 % | RESPIRATION RATE: 17 BRPM | HEART RATE: 83 BPM | BODY MASS INDEX: 40.32 KG/M2 | HEIGHT: 68 IN | DIASTOLIC BLOOD PRESSURE: 88 MMHG

## 2022-05-06 DIAGNOSIS — G44.89 OTHER HEADACHE SYNDROME: Primary | ICD-10-CM

## 2022-05-06 DIAGNOSIS — H53.9 VISUAL DISTURBANCE: ICD-10-CM

## 2022-05-06 DIAGNOSIS — R90.82 WHITE MATTER ABNORMALITY ON MRI OF BRAIN: ICD-10-CM

## 2022-05-06 PROCEDURE — 3017F COLORECTAL CA SCREEN DOC REV: CPT | Performed by: NURSE PRACTITIONER

## 2022-05-06 PROCEDURE — G8417 CALC BMI ABV UP PARAM F/U: HCPCS | Performed by: NURSE PRACTITIONER

## 2022-05-06 PROCEDURE — 99213 OFFICE O/P EST LOW 20 MIN: CPT | Performed by: NURSE PRACTITIONER

## 2022-05-06 PROCEDURE — G8427 DOCREV CUR MEDS BY ELIG CLIN: HCPCS | Performed by: NURSE PRACTITIONER

## 2022-05-06 PROCEDURE — 1036F TOBACCO NON-USER: CPT | Performed by: NURSE PRACTITIONER

## 2022-05-06 RX ORDER — LOSARTAN POTASSIUM 50 MG/1
50 TABLET ORAL DAILY
COMMUNITY

## 2022-05-06 RX ORDER — PHENTERMINE HYDROCHLORIDE 37.5 MG/1
37.5 CAPSULE ORAL EVERY MORNING
COMMUNITY

## 2022-05-06 RX ORDER — TOPIRAMATE 50 MG/1
TABLET, FILM COATED ORAL
Qty: 30 TABLET | Refills: 3 | Status: SHIPPED | OUTPATIENT
Start: 2022-05-06

## 2022-05-06 NOTE — PATIENT INSTRUCTIONS
1. Start Topamax 25 mg tablet daily for one week then 50 mg daily there after. Take with plenty of fluids. 2. Continue with Ubrelvy 50 mg daily as needed for breakthrough headache  3. Repeat MRI brain W/WO contrast in June 2022, please call us to schedule. 4. Follow through with ophthalmology recommendations  5. Keep headache diary  6. Follow up in 6 weeks or sooner if needed. 7. Call if any questions or concerns.

## 2022-05-06 NOTE — PROGRESS NOTES
NEUROLOGY OUT PATIENT FOLLOW UP NOTE:  5/6/20221:47 PM    Hunter Lundberg is here for follow up for headache, visual distruabnce, white matter abnormality. ROS:  Respiratory : no cough, no shortness of breath  Cardiac: no chest pain. No palpitations. Renal : no flank pain, no hematuria    Skin: no rash      Allergies   Allergen Reactions    Sulfa Antibiotics Rash    Demerol      BP decrease, dizziness    Wellbutrin [Bupropion] Other (See Comments)     Panic Attacks    Amoxicillin-Pot Clavulanate Rash       Current Outpatient Medications:     losartan (COZAAR) 50 MG tablet, Take 50 mg by mouth daily, Disp: , Rfl:     phentermine (ADIPEX-P) 37.5 MG capsule, Take 37.5 mg by mouth every morning., Disp: , Rfl:     Ubrogepant (UBRELVY) 50 MG TABS, Take 50 mg by mouth as needed (headache), Disp: 10 tablet, Rfl: 2    Cholecalciferol (VITAMIN D) 25 MCG TABS, Take 1 tablet by mouth daily, Disp: 60 tablet, Rfl: 0    escitalopram (LEXAPRO) 10 MG tablet, Take 20 mg by mouth daily, Disp: , Rfl:     triamterene-hydroCHLOROthiazide (MAXZIDE-25) 37.5-25 MG per tablet, Take 0.5 tablets by mouth daily, Disp: , Rfl:     Ascorbic Acid (VITAMIN C) 1000 MG tablet, Take 2,000 mg by mouth nightly, Disp: , Rfl:     CALCIUM-MAGNESIUM-ZINC PO, Take 2 tablets by mouth nightly, Disp: , Rfl:     b complex vitamins capsule, Take 1 capsule by mouth daily, Disp: , Rfl:     Omega-3 1000 MG CAPS, Take 1 capsule by mouth nightly , Disp: , Rfl:     omeprazole (PRILOSEC) 40 MG delayed release capsule, Take 40 mg by mouth daily, Disp: , Rfl:     LORazepam (ATIVAN) 0.5 MG tablet, Take 1 tablet by mouth daily as needed for Anxiety, Disp: 10 tablet, Rfl: 0    simvastatin (ZOCOR) 20 MG tablet, Take 1 tablet by mouth every evening., Disp: 90 tablet, Rfl: 2    Coenzyme Q10 (CO Q-10) 400 MG CAPS, Take 100 mg by mouth nightly , Disp: , Rfl:     Probiotic Product (PROBIOTIC PEARLS PO), Take  by mouth daily. , Disp: , Rfl:     therapeutic multivitamin-minerals (THERAGRAN-M) tablet, Take 1 tablet by mouth 2 times daily. , Disp: , Rfl:     aspirin 81 MG EC tablet, Take 81 mg by mouth daily. , Disp: , Rfl:     zinc sulfate (ZINCATE) 220 (50 Zn) MG capsule, Take 1 capsule by mouth daily (Patient not taking: Reported on 5/6/2022), Disp: 30 capsule, Rfl: 0    I reviewed the past medical history, allergies, medications, social history and family history. PE:   Vitals:    05/06/22 1322   BP: 132/88   Pulse: 83   Resp: 17   SpO2: 98%   Weight: 266 lb (120.7 kg)   Height: 5' 7.5\" (1.715 m)     General Appearance:  alert and cooperative, she is overweight and wearing a mask. Skin:  Skin color, texture, turgor normal. No rashes or lesions. Gen: NAD, Language is Intact. Skin: no rash, lesion, moist to touch. warm  Head: no rash, no icterus  Neck: There is no carotid bruits. The Neck is supple. There is no neck lymphadenopathy. Neuro: CN 2-12 grossly intact with no focal deficits. Power 5/5 Throughout symmetric, Reflexes are +2 symmetric. Long tracts are intact. Cerebellar exam is Intact. Sensory exam is intact to light touch. Gait is intact. Musculoskeletal:  Has no hand arthritis, no limitation of ROM in any of the four extremities. Lower extremities no edema          DATA:      Results for orders placed or performed in visit on 02/14/22   Vitamin B12 & Folate   Result Value Ref Range    Vitamin B-12 670 180 - 914 pg/mL    Folate >25.0 >5.8 ng/mL   Vitamin B6   Result Value Ref Range    Vitamin B6, Plasma 135 (H) 20 - 125 nmol/L          Results for orders placed during the hospital encounter of 12/28/21    MRI BRAIN WO CONTRAST    Narrative  PROCEDURE: MRI BRAIN WO CONTRAST    INDICATION:  Headache syndrome, Other irregular eye movements, Dizziness. Left-sided head pain with left eye twitching and blurred vision. COMPARISON: MR brain dated 3/14/2014.     TECHNIQUE: Multiplanar and multiple spin echo MRI images were obtained of the brain without contrast.    FINDINGS:  There is mild central volume loss which has worsened in the interval since 2014. There are mild scattered focal areas of T2/FLAIR prolongation in the periventricular, subcortical deep white matter, slightly worsened compared to prior exam. No intra or  extra-axial mass is identified. No focal areas of restricted diffusion are present. The major vascular flow voids appear patent. Orbits are unremarkable. Paranasal sinuses and mastoid air cells are clear. Impression  1. No evidence of acute intracranial abnormality. 2. Mild severity chronic microvascular angiopathy superimposed on mild volume loss which has worsened in the interval since 2014. **This report has been created using voice recognition software. It may contain minor errors which are inherent in voice recognition technology. **    Final report electronically signed by Dr. Tatianna Mayorga MD on 12/29/2021 9:04 AM     2/14/22 1315    Vitamin B-12 180 - 914 pg/mL 670    Folate >5.8 ng/mL >25.0         Ref Range & Units 2/14/22 1315   Vitamin B6, Plasma 20 - 125 nmol/L 135 High                 Assessment:     Diagnosis Orders   1. Other headache syndrome     2. Visual disturbance     3. White matter abnormality on MRI of brain          She reports her headache is some better since starting the Aracelis Moots. She is still having some blurred vision. She was evaluated by ophthalmology, she was told there were no concerning findings from their standpoint. She will need to undergo repeat MRI brain W/Wo contrast for follow up study re: white matter abnormality noted. Her headaches are frequent enough to justify daily preventative medication. We will start her on topamax titration for target dose of 50 mg daily, to be further increased, based on patient feedback. After detailed discussion with patient we agreed on the following plan. Plan:  1.  Start Topamax 25 mg tablet daily for one week then 50 mg daily there after. Take with plenty of fluids. 2. Continue with Ubrelvy 50 mg daily as needed for breakthrough headache  3. Repeat MRI brain W/WO contrast in June 2022, please call us to schedule. 4. Follow through with ophthalmology recommendations  5. Keep headache diary  6. Follow up in 6 weeks or sooner if needed. 7. Call if any questions or concerns.       Total time 24 min    Pancho Phillips, GORAN - CNP

## 2022-06-06 ENCOUNTER — HOSPITAL ENCOUNTER (OUTPATIENT)
Dept: MRI IMAGING | Age: 57
Discharge: HOME | End: 2022-06-06
Payer: COMMERCIAL

## 2022-06-06 DIAGNOSIS — R90.82 WHITE MATTER ABNORMALITY ON MRI OF BRAIN: ICD-10-CM

## 2022-06-06 DIAGNOSIS — H53.9 VISUAL DISTURBANCE: ICD-10-CM

## 2022-06-06 DIAGNOSIS — G44.89 OTHER HEADACHE SYNDROME: ICD-10-CM

## 2022-06-06 PROCEDURE — 6360000004 HC RX CONTRAST MEDICATION: Performed by: NURSE PRACTITIONER

## 2022-06-06 PROCEDURE — 70553 MRI BRAIN STEM W/O & W/DYE: CPT

## 2022-06-06 PROCEDURE — A9579 GAD-BASE MR CONTRAST NOS,1ML: HCPCS | Performed by: NURSE PRACTITIONER

## 2022-06-06 RX ADMIN — GADOTERIDOL 20 ML: 279.3 INJECTION, SOLUTION INTRAVENOUS at 18:37

## 2022-06-07 ENCOUNTER — TELEPHONE (OUTPATIENT)
Dept: NEUROLOGY | Age: 57
End: 2022-06-07

## 2022-06-07 NOTE — TELEPHONE ENCOUNTER
----- Message from GORAN Navarrete - CNP sent at 6/7/2022  7:47 AM EDT -----  Please let patient know her MRI brain showed Stable MR appearance of the brain. No new abnormalities.   Celso Brown, CNP

## 2022-07-17 ENCOUNTER — APPOINTMENT (OUTPATIENT)
Dept: GENERAL RADIOLOGY | Age: 57
End: 2022-07-17
Payer: COMMERCIAL

## 2022-07-17 ENCOUNTER — HOSPITAL ENCOUNTER (EMERGENCY)
Age: 57
Discharge: HOME | End: 2022-07-17
Attending: EMERGENCY MEDICINE
Payer: COMMERCIAL

## 2022-07-17 ENCOUNTER — APPOINTMENT (OUTPATIENT)
Dept: CT IMAGING | Age: 57
End: 2022-07-17
Payer: COMMERCIAL

## 2022-07-17 VITALS
DIASTOLIC BLOOD PRESSURE: 56 MMHG | HEART RATE: 69 BPM | HEIGHT: 67 IN | SYSTOLIC BLOOD PRESSURE: 125 MMHG | OXYGEN SATURATION: 96 % | RESPIRATION RATE: 18 BRPM | BODY MASS INDEX: 40.81 KG/M2 | WEIGHT: 260 LBS | TEMPERATURE: 98 F

## 2022-07-17 DIAGNOSIS — S01.81XA FACIAL LACERATION, INITIAL ENCOUNTER: Primary | ICD-10-CM

## 2022-07-17 DIAGNOSIS — S80.01XA CONTUSION OF RIGHT KNEE, INITIAL ENCOUNTER: ICD-10-CM
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
## 2022-07-17 PROCEDURE — 73564 X-RAY EXAM KNEE 4 OR MORE: CPT

## 2022-07-17 PROCEDURE — 12011 RPR F/E/E/N/L/M 2.5 CM/<: CPT

## 2022-07-17 PROCEDURE — 2500000003 HC RX 250 WO HCPCS: Performed by: STUDENT IN AN ORGANIZED HEALTH CARE EDUCATION/TRAINING PROGRAM

## 2022-07-17 PROCEDURE — 73030 X-RAY EXAM OF SHOULDER: CPT

## 2022-07-17 PROCEDURE — 96374 THER/PROPH/DIAG INJ IV PUSH: CPT

## 2022-07-17 PROCEDURE — 72125 CT NECK SPINE W/O DYE: CPT

## 2022-07-17 PROCEDURE — 6360000002 HC RX W HCPCS: Performed by: STUDENT IN AN ORGANIZED HEALTH CARE EDUCATION/TRAINING PROGRAM

## 2022-07-17 PROCEDURE — 6370000000 HC RX 637 (ALT 250 FOR IP): Performed by: STUDENT IN AN ORGANIZED HEALTH CARE EDUCATION/TRAINING PROGRAM

## 2022-07-17 PROCEDURE — 99284 EMERGENCY DEPT VISIT MOD MDM: CPT

## 2022-07-17 PROCEDURE — 72192 CT PELVIS W/O DYE: CPT

## 2022-07-17 PROCEDURE — 96376 TX/PRO/DX INJ SAME DRUG ADON: CPT

## 2022-07-17 PROCEDURE — 73502 X-RAY EXAM HIP UNI 2-3 VIEWS: CPT

## 2022-07-17 PROCEDURE — 70450 CT HEAD/BRAIN W/O DYE: CPT

## 2022-07-17 RX ORDER — HYDROCODONE BITARTRATE AND ACETAMINOPHEN 5; 325 MG/1; MG/1
1 TABLET ORAL ONCE
Status: COMPLETED | OUTPATIENT
Start: 2022-07-17 | End: 2022-07-17

## 2022-07-17 RX ORDER — MORPHINE SULFATE 4 MG/ML
4 INJECTION, SOLUTION INTRAMUSCULAR; INTRAVENOUS ONCE
Status: COMPLETED | OUTPATIENT
Start: 2022-07-17 | End: 2022-07-17

## 2022-07-17 RX ORDER — LIDOCAINE HYDROCHLORIDE 10 MG/ML
5 INJECTION, SOLUTION EPIDURAL; INFILTRATION; INTRACAUDAL; PERINEURAL ONCE
Status: COMPLETED | OUTPATIENT
Start: 2022-07-17 | End: 2022-07-17

## 2022-07-17 RX ADMIN — MORPHINE SULFATE 4 MG: 4 INJECTION, SOLUTION INTRAMUSCULAR; INTRAVENOUS at 16:44

## 2022-07-17 RX ADMIN — LIDOCAINE HYDROCHLORIDE 5 ML: 10 INJECTION, SOLUTION EPIDURAL; INFILTRATION; INTRACAUDAL; PERINEURAL at 16:47

## 2022-07-17 RX ADMIN — HYDROCODONE BITARTRATE AND ACETAMINOPHEN 1 TABLET: 5; 325 TABLET ORAL at 15:52

## 2022-07-17 RX ADMIN — MORPHINE SULFATE 4 MG: 4 INJECTION, SOLUTION INTRAMUSCULAR; INTRAVENOUS at 19:37

## 2022-07-17 ASSESSMENT — PAIN - FUNCTIONAL ASSESSMENT
PAIN_FUNCTIONAL_ASSESSMENT: 0-10

## 2022-07-17 ASSESSMENT — PAIN DESCRIPTION - ORIENTATION
ORIENTATION: RIGHT
ORIENTATION: RIGHT

## 2022-07-17 ASSESSMENT — ENCOUNTER SYMPTOMS
BACK PAIN: 0
NAUSEA: 1
BLOOD IN STOOL: 0
VOMITING: 0
TROUBLE SWALLOWING: 0
COUGH: 0
DIARRHEA: 0
SHORTNESS OF BREATH: 0
ABDOMINAL PAIN: 0
SORE THROAT: 0

## 2022-07-17 ASSESSMENT — PAIN SCALES - GENERAL
PAINLEVEL_OUTOF10: 10
PAINLEVEL_OUTOF10: 5
PAINLEVEL_OUTOF10: 10

## 2022-07-17 ASSESSMENT — PAIN DESCRIPTION - DESCRIPTORS: DESCRIPTORS: ACHING

## 2022-07-17 ASSESSMENT — PAIN DESCRIPTION - LOCATION
LOCATION: HEAD;SHOULDER;KNEE
LOCATION: HEAD;SHOULDER;KNEE

## 2022-07-17 NOTE — ED NOTES
PT resting in bed with family at bedside, patient states that she is having continuous head pain.       Kay Rosales RN  07/17/22 0920

## 2022-07-17 NOTE — ED TRIAGE NOTES
Pt presents to the ed with [facility] for c/o fall. [facility]. PT states that she hit her head and eye and landed on the right side of her body. Pt states that she is having right hip, knee, shoulder pain. Small laceration to the right eyebrow, bleeding is controlled. Pt given 4mg zofran in route to hospital. PT denied c collar and pain medications in route.

## 2022-07-17 NOTE — ED PROVIDER NOTES
[facility] ENCOUNTER          Pt Name: Iqra Tang  MRN: [id number]  [place] 1965  Date of evaluation: 7/17/2022  Treating Resident Physician: Grecia Corbin MD  Supervising Physician: Monique Medellin MD      [address] [place] [address]       Chief Complaint   Patient presents with    Fall    Laceration    Knee Pain           Hip Pain    Shoulder Pain     History obtained from the patient. HISTORY OF PRESENT ILLNESS    HPI  Iqra Tang is a 55 y.o. female who presents to the emergency department for evaluation following a mechanical fall. Patient was wearing flip-flops and slipped in a puddle outside of [place]. Patient fell forward and landed on the right side of the body and hit her head. She has a laceration over the right eyebrow. Reports pain over the right shoulder but full range of motion. Reports pain over the right thigh as well as the right knee. Patient has had 2 TKRs. Patient was able to ambulate following the fall. She denies loss of consciousness, reports nausea but no emesis, denies weakness, numbness, paresthesias, headache. Patient received Zofran in route to the emergency department  The patient has no other acute complaints at this time. REVIEW OF SYSTEMS   Review of Systems   Constitutional:  Negative for chills, diaphoresis, fatigue and fever. HENT:  Negative for sore throat and trouble swallowing. Eyes:  Negative for visual disturbance. Respiratory:  Negative for cough and shortness of breath. Cardiovascular:  Negative for chest pain, palpitations and leg swelling. Gastrointestinal:  Positive for nausea. Negative for abdominal pain, blood in stool, diarrhea and vomiting. Genitourinary:  Negative for dysuria, hematuria and urgency. Musculoskeletal:  Negative for arthralgias, back pain, myalgias and neck pain. Skin:  Negative for rash.    Neurological:  Negative for seizures, syncope, weakness, numbness and headaches. PAST MEDICAL AND SURGICAL HISTORY     Past Medical History:   Diagnosis Date    Anxiety     Depression     Hyperlipidemia     Hypertension     Left-sided Bell's palsy     Smoker     Wears dentures     pt has implants to hold dentures in place     Past Surgical History:   Procedure Laterality Date     SECTION      x 3    DENTAL SURGERY      implants    EGD COLONOSCOPY Left 10/21/2019    EGD ESOPHAGOGASTRODUODENOSCOPY DILATATION performed by Teja Calderón MD at [address] [facility]    ENDOSCOPY, COLON, DIAGNOSTIC      HYSTERECTOMY      endometriosis    KNEE ARTHROSCOPY Bilateral          MEDICATIONS     Current Facility-Administered Medications:     morphine injection 4 mg, 4 mg, IntraVENous, Once, Colleen Pérez MD    Current Outpatient Medications:     losartan (COZAAR) 50 MG tablet, Take 50 mg by mouth daily, Disp: , Rfl:     phentermine (ADIPEX-P) 37.5 MG capsule, Take 37.5 mg by mouth every morning., Disp: , Rfl:     topiramate (TOPAMAX) 50 MG tablet, Take Topamax 25 mg tablet daily for one week then 50 mg daily there after. Take with plenty of fluids. , Disp: 30 tablet, Rfl: 3    Ubrogepant (UBRELVY) 50 MG TABS, Take 50 mg by mouth as needed (headache), Disp: 10 tablet, Rfl: 2    zinc sulfate (ZINCATE) 220 (50 Zn) MG capsule, Take 1 capsule by mouth daily (Patient not taking: Reported on 2022), Disp: 30 capsule, Rfl: 0    Cholecalciferol (VITAMIN D) 25 MCG TABS, Take 1 tablet by mouth daily, Disp: 60 tablet, Rfl: 0    escitalopram (LEXAPRO) 10 MG tablet, Take 20 mg by mouth daily, Disp: , Rfl:     triamterene-hydroCHLOROthiazide (MAXZIDE-25) 37.5-25 MG per tablet, Take 0.5 tablets by mouth daily, Disp: , Rfl:     Ascorbic Acid (VITAMIN C) 1000 MG tablet, Take 2,000 mg by mouth nightly, Disp: , Rfl:     CALCIUM-MAGNESIUM-ZINC PO, Take 2 tablets by mouth nightly, Disp: , Rfl:     b complex vitamins capsule, Take 1 capsule by mouth daily, Disp: , Rfl:     Omega-3 1000 MG CAPS, Take 1 capsule by mouth nightly , Disp: , Rfl:     omeprazole (PRILOSEC) 40 MG delayed release capsule, Take 40 mg by mouth daily, Disp: , Rfl:     LORazepam (ATIVAN) 0.5 MG tablet, Take 1 tablet by mouth daily as needed for Anxiety, Disp: 10 tablet, Rfl: 0    simvastatin (ZOCOR) 20 MG tablet, Take 1 tablet by mouth every evening., Disp: 90 tablet, Rfl: 2    Coenzyme Q10 (CO Q-10) 400 MG CAPS, Take 100 mg by mouth nightly , Disp: , Rfl:     Probiotic Product (PROBIOTIC PEARLS PO), Take  by mouth daily. , Disp: , Rfl:     therapeutic multivitamin-minerals (THERAGRAN-M) tablet, Take 1 tablet by mouth 2 times daily. , Disp: , Rfl:     aspirin 81 MG EC tablet, Take 81 mg by mouth daily. , Disp: , Rfl:       SOCIAL HISTORY     Social History     Social History Narrative    Not on file     Social History     Tobacco Use    Smoking status: Former     Packs/day: 0.25     Years: 25.00     Pack years: 6.25     Types: Cigarettes     Quit date: 2014     Years since quittin.6    Smokeless tobacco: Never    Tobacco comments:     working on quitting. Vaping Use    Vaping Use: Never used   Substance Use Topics    Alcohol use: No    Drug use: No         ALLERGIES     Allergies   Allergen Reactions    Sulfa Antibiotics Rash    Demerol      BP decrease, dizziness    Wellbutrin [Bupropion] Other (See Comments)     Panic Attacks    Amoxicillin-Pot Clavulanate Rash         FAMILY HISTORY     Family History   Problem Relation Age of Onset    Diabetes Father     Cancer Maternal Grandfather     Diabetes Paternal Grandmother          PREVIOUS RECORDS   Previous records reviewed: I reviewed the patient's past medical records including relevant labs, imaging and procedures.           PHYSICAL EXAM     ED Triage Vitals [22 1517]   BP Temp Temp Source Heart Rate Resp SpO2 Height Weight   (!) 152/76 98 °F (36.7 °C) Oral 90 18 98 % 5' 7\" (1.702 m) 260 lb (117.9 kg)     Initial vital signs and nursing assessment reviewed and normal. Body mass index is 40.72 kg/m². Pulsoximetry is normal per my interpretation. Additional Vital Signs:  Vitals:    07/17/22 1842   BP: 132/78   Pulse: 77   Resp: 18   Temp:    SpO2: 98%       Physical Exam  Vitals and nursing note reviewed. Constitutional:       Appearance: Normal appearance. HENT:      Head: Normocephalic and atraumatic. Right Ear: Tympanic membrane normal.      Left Ear: Tympanic membrane normal.      Nose: Nose normal.      Mouth/Throat:      Mouth: Mucous membranes are moist.      Pharynx: Oropharynx is clear. No oropharyngeal exudate. Eyes:      General: No scleral icterus. Extraocular Movements: Extraocular movements intact. Conjunctiva/sclera: Conjunctivae normal.      Pupils: Pupils are equal, round, and reactive to light. Comments: There is a 2 cm laceration below the right eyebrow   Cardiovascular:      Rate and Rhythm: Normal rate and regular rhythm. Pulses: Normal pulses. Heart sounds: Normal heart sounds. No murmur heard. No friction rub. No gallop. Pulmonary:      Effort: Pulmonary effort is normal. No respiratory distress. Breath sounds: Normal breath sounds. Abdominal:      Palpations: Abdomen is soft. Tenderness: There is no abdominal tenderness. There is no right CVA tenderness, left CVA tenderness, guarding or rebound. Musculoskeletal:         General: Tenderness (There is tenderness to the right knee and lateral right thigh. There is a abrasion over the right knee.) present. No swelling. Normal range of motion. Cervical back: Normal range of motion and neck supple. Right lower leg: No edema. Left lower leg: No edema. Comments:     Midline tenderness over the cervical spine   Skin:     General: Skin is warm and dry. Capillary Refill: Capillary refill takes less than 2 seconds. Neurological:      General: No focal deficit present. Mental Status: She is alert and oriented to person, place, and time. Cranial Nerves: No cranial nerve deficit. Motor: No weakness.            Lac Repair    Date/Time: 7/17/2022 5:15 PM  Performed by: Colleen Pérez MD  Authorized by: Dilia Baez MD     Consent:     Consent obtained:  Verbal    Consent given by:  Patient    Risks, benefits, and alternatives were discussed: yes      Risks discussed:  Infection, pain, poor cosmetic result, poor wound healing, need for additional repair, nerve damage and retained foreign body    Alternatives discussed:  No treatment, delayed treatment and observation  Universal protocol:     Procedure explained and questions answered to patient or proxy's satisfaction: yes      Relevant documents present and verified: yes      Test results available: yes      Imaging studies available: yes      Required blood products, implants, devices, and special equipment available: yes      Site/side marked: yes      Immediately prior to procedure, a time out was called: yes      Patient identity confirmed:  Verbally with patient and arm band  Anesthesia:     Anesthesia method:  Local infiltration    Local anesthetic:  Lidocaine 1% w/o epi  Laceration details:     Location:  Face    Face location:  R eyebrow  Exploration:     Hemostasis achieved with:  Direct pressure    Wound extent: areolar tissue violated    Treatment:     Area cleansed with:  Saline    Amount of cleaning:  Standard    Irrigation solution:  Sterile saline    Irrigation method:  Pressure wash    Visualized foreign bodies/material removed: no      Debridement:  None    Undermining:  None    Scar revision: no    Skin repair:     Repair method:  Sutures    Suture technique:  Simple interrupted    Number of sutures:  7  Approximation:     Approximation:  Close  Repair type:     Repair type:  Simple  Post-procedure details:     Dressing:  Open (no dressing)    Procedure completion:  Tolerated        MEDICAL DECISION MAKING   Initial Assessment:   57yo presenting following mechanical fall      Differential diagnosis includes but is not limited to:  Femur fracture, hemarthrosis, hematoma, cervical fracture, burst fracture, wedge fracture, long bone fracture      Although some of these diagnoses are unlikely they were considered in my medical decision making. Plan:    X-rays  CT neck        ED RESULTS   Laboratory results:  Labs Reviewed - No data to display    Radiologic studies results:  CT PELVIS WO CONTRAST Additional Contrast? None   Final Result   1. No acute bony abnormality. **This report has been created using voice recognition software. It may contain minor errors which are inherent in voice recognition technology. **      Final report electronically signed by Dr Amanda Mathis on 7/17/2022 6:56 PM      CT Head WO Contrast   Final Result   1. No acute intracranial abnormality. **This report has been created using voice recognition software. It may contain minor errors which are inherent in voice recognition technology. **      Final report electronically signed by Dr Amanda Mathis on 7/17/2022 6:47 PM      CT Cervical Spine WO Contrast   Final Result   1. No acute cervical spine fracture. 2. Straightening of the cervical lordosis that can be the result of positioning or spasm. **This report has been created using voice recognition software. It may contain minor errors which are inherent in voice recognition technology. **      Final report electronically signed by Dr Amanda Mathis on 7/17/2022 6:51 PM      XR HIP RIGHT (2-3 VIEWS)   Final Result   1. Questionable lucency is seen in the right superior pubic ramus that may suggest the presence of a nondisplaced fracture. However, a CT of the right hip is recommended for confirmation. **This report has been created using voice recognition software. It may contain minor errors which are inherent in voice recognition technology. **      Final report electronically signed by Dr Nic Monae Mckenna on 7/17/2022 4:24 PM      XR KNEE RIGHT (MIN 4 VIEWS)   Final Result   1. No acute bony abnormality. Small joint effusion. **This report has been created using voice recognition software. It may contain minor errors which are inherent in voice recognition technology. **      Final report electronically signed by Dr Amanda Mathis on 7/17/2022 4:28 PM      XR SHOULDER RIGHT (MIN 2 VIEWS)   Final Result   1. No acute bony abnormality. 2. Degenerative changes as described above. **This report has been created using voice recognition software. It may contain minor errors which are inherent in voice recognition technology. **      Final report electronically signed by Dr Amanda Mathis on 7/17/2022 4:26 PM          ED Medications administered this visit:   Medications   morphine injection 4 mg (has no administration in time range)   HYDROcodone-acetaminophen (NORCO) 5-325 MG per tablet 1 tablet (1 tablet Oral Given 7/17/22 1552)   morphine injection 4 mg (4 mg IntraVENous Given 7/17/22 1644)   lidocaine PF 1 % injection 5 mL (5 mLs IntraDERmal Given by Other 7/17/22 1647)         ED COURSE     ED Course as of 07/19/22 0133   Sun Jul 17, 2022   1623 Patient uptodate on Tdap [TM]   1706 XR KNEE RIGHT (MIN 4 VIEWS) [TM]   1706 XR KNEE RIGHT (MIN 4 VIEWS)  IMPRESSION:  1. No acute bony abnormality. Small joint effusion. [TM]   1707 XR HIP RIGHT (2-3 VIEWS)    IMPRESSION:  1. Questionable lucency is seen in the right superior pubic ramus that may suggest the presence of a nondisplaced fracture. However, a CT of the right hip is recommended for confirmation. [TM]   5718 CT Head WO Contrast  IMPRESSION:  1. No acute intracranial abnormality. [TM]   5636 CT Cervical Spine WO Contrast  IMPRESSION:  1. No acute cervical spine fracture. 2. Straightening of the cervical lordosis that can be the result of positioning or spasm.  [TM]   1900 CT PELVIS WO CONTRAST Additional Contrast? None    IMPRESSION:  1. No acute bony abnormality. [TM]      ED Course User Index  [TM] Adam Turner MD         MDM:   The patient presents following a mechanical fall. CT imaging of the head revealed no intracranial hemorrhage or skull fracture. CT imaging of the neck did not reveal any acute traumatic changes. The patient has no focal neurologic deficits. Patient did not sustain a fracture. Patient has ambulated in the emergency department several times is able to bear weight without difficulty. Strict return precautions and follow up instructions were discussed with the patient prior to discharge, with which the patient agrees. MEDICATION CHANGES     New Prescriptions    No medications on file         FINAL DISPOSITION     Final diagnoses:   Facial laceration, initial encounter   Fall, initial encounter   Contusion of right knee, initial encounter     Condition: condition: stable  Dispo: Discharge to home      This transcription was electronically signed. Parts of this transcriptions may have been dictated by use of voice recognition software and electronically transcribed, and parts may have been transcribed with the assistance of an ED scribe. The transcription may contain errors not detected in proofreading. Please refer to my supervising physician's documentation if my documentation differs. Electronically Signed: Birdie Wellington MD, 07/17/22, 7:02 PM         Adam Turner MD  Resident  07/17/22 5963    Attending Supervising Physician's Attestation Statement  I performed a history and physical examination on the patient and discussed the management with the resident physician. I reviewed and agree with the findings and plan as documented in his note unless described otherwise below. Pt presents to the ER after mechanical trip and fall, +hit head with gaping laceration, repaired here.   Neurologic exam nonfocal, imaging negative for emergent pathology, patient counseled regarding need for fu with pcp, will review all test results, also counseled regarding ER return precautions.     Electronically signed by Bishop Trae MD on 7/19/22 at 1:34 AM EUGENIO Vallejo MD  07/19/22 0221

## 2022-07-27 ENCOUNTER — HOSPITAL ENCOUNTER (OUTPATIENT)
Dept: CT IMAGING | Age: 57
Discharge: HOME | End: 2022-07-27
Payer: COMMERCIAL

## 2022-07-27 DIAGNOSIS — S09.90XA INJURY OF HEAD, INITIAL ENCOUNTER: ICD-10-CM

## 2022-07-27 DIAGNOSIS — G44.221 CHRONIC TENSION-TYPE HEADACHE, INTRACTABLE: ICD-10-CM

## 2022-07-27 DIAGNOSIS — R51.9 RIGHT-SIDED HEADACHE: ICD-10-CM

## 2022-07-27 PROCEDURE — 70450 CT HEAD/BRAIN W/O DYE: CPT

## 2022-09-02 ENCOUNTER — OFFICE VISIT (OUTPATIENT)
Dept: NEUROLOGY | Age: 57
End: 2022-09-02
Payer: COMMERCIAL

## 2022-09-02 VITALS
DIASTOLIC BLOOD PRESSURE: 80 MMHG | OXYGEN SATURATION: 96 % | HEART RATE: 74 BPM | WEIGHT: 268 LBS | SYSTOLIC BLOOD PRESSURE: 118 MMHG | HEIGHT: 67 IN | BODY MASS INDEX: 42.06 KG/M2

## 2022-09-02 DIAGNOSIS — R20.0 BILATERAL LEG NUMBNESS: ICD-10-CM

## 2022-09-02 DIAGNOSIS — H53.9 VISUAL DISTURBANCE: ICD-10-CM

## 2022-09-02 DIAGNOSIS — G44.89 OTHER HEADACHE SYNDROME: Primary | ICD-10-CM

## 2022-09-02 PROCEDURE — G8417 CALC BMI ABV UP PARAM F/U: HCPCS | Performed by: PSYCHIATRY & NEUROLOGY

## 2022-09-02 PROCEDURE — 1036F TOBACCO NON-USER: CPT | Performed by: PSYCHIATRY & NEUROLOGY

## 2022-09-02 PROCEDURE — 3017F COLORECTAL CA SCREEN DOC REV: CPT | Performed by: PSYCHIATRY & NEUROLOGY

## 2022-09-02 PROCEDURE — G8427 DOCREV CUR MEDS BY ELIG CLIN: HCPCS | Performed by: PSYCHIATRY & NEUROLOGY

## 2022-09-02 PROCEDURE — 99214 OFFICE O/P EST MOD 30 MIN: CPT | Performed by: PSYCHIATRY & NEUROLOGY

## 2022-09-02 NOTE — PROGRESS NOTES
NEUROLOGY OUT PATIENT FOLLOW UP NOTE:  9/2/20223:41 PM    Itzel Murphy is here for follow up for headache, visual distruabnce, white matter abnormality. Allergies   Allergen Reactions    Sulfa Antibiotics Rash    Demerol      BP decrease, dizziness    Wellbutrin [Bupropion] Other (See Comments)     Panic Attacks    Amoxicillin-Pot Clavulanate Rash       Current Outpatient Medications:     losartan (COZAAR) 50 MG tablet, Take 50 mg by mouth daily, Disp: , Rfl:     Ubrogepant (UBRELVY) 50 MG TABS, Take 50 mg by mouth as needed (headache), Disp: 10 tablet, Rfl: 2    Cholecalciferol (VITAMIN D) 25 MCG TABS, Take 1 tablet by mouth daily, Disp: 60 tablet, Rfl: 0    escitalopram (LEXAPRO) 10 MG tablet, Take 20 mg by mouth daily, Disp: , Rfl:     triamterene-hydroCHLOROthiazide (MAXZIDE-25) 37.5-25 MG per tablet, Take 0.5 tablets by mouth daily, Disp: , Rfl:     Ascorbic Acid (VITAMIN C) 1000 MG tablet, Take 2,000 mg by mouth nightly, Disp: , Rfl:     CALCIUM-MAGNESIUM-ZINC PO, Take 2 tablets by mouth nightly, Disp: , Rfl:     Omega-3 1000 MG CAPS, Take 1 capsule by mouth nightly , Disp: , Rfl:     omeprazole (PRILOSEC) 40 MG delayed release capsule, Take 40 mg by mouth daily, Disp: , Rfl:     LORazepam (ATIVAN) 0.5 MG tablet, Take 1 tablet by mouth daily as needed for Anxiety, Disp: 10 tablet, Rfl: 0    simvastatin (ZOCOR) 20 MG tablet, Take 1 tablet by mouth every evening., Disp: 90 tablet, Rfl: 2    Coenzyme Q10 (CO Q-10) 400 MG CAPS, Take 100 mg by mouth nightly , Disp: , Rfl:     Probiotic Product (PROBIOTIC PEARLS PO), Take  by mouth daily. , Disp: , Rfl:     therapeutic multivitamin-minerals (THERAGRAN-M) tablet, Take 1 tablet by mouth 2 times daily. , Disp: , Rfl:     aspirin 81 MG EC tablet, Take 81 mg by mouth daily. , Disp: , Rfl:     phentermine 37.5 MG capsule, Take 37.5 mg by mouth every morning.  (Patient not taking: Reported on 9/2/2022), Disp: , Rfl:     topiramate (TOPAMAX) 50 MG tablet, Take Topamax 25 mg tablet daily for one week then 50 mg daily there after. Take with plenty of fluids. , Disp: 30 tablet, Rfl: 3    zinc sulfate (ZINCATE) 220 (50 Zn) MG capsule, Take 1 capsule by mouth daily (Patient not taking: No sig reported), Disp: 30 capsule, Rfl: 0    b complex vitamins capsule, Take 1 capsule by mouth daily (Patient not taking: Reported on 9/2/2022), Disp: , Rfl:     I reviewed the past medical history, allergies, medications, social history and family history. PE:   Vitals:    09/02/22 1533   BP: 118/80   Site: Left Upper Arm   Position: Sitting   Cuff Size: Large Adult   Pulse: 74   SpO2: 96%   Weight: 268 lb (121.6 kg)   Height: 5' 7\" (1.702 m)     General Appearance:  alert and cooperative, she is overweight and wearing a mask. Skin:  Skin color, texture, turgor normal. No rashes or lesions. Gen: NAD, Language is Intact. Skin: no rash, lesion, moist to touch. warm  Head: no rash, no icterus  Neck: There is no carotid bruits. The Neck is supple. There is no neck lymphadenopathy. Neuro: CN 2-12 grossly intact with no focal deficits. Power 5/5 Throughout symmetric, Reflexes are +2 symmetric. Long tracts are intact. Cerebellar exam is Intact. Sensory exam is intact to light touch. Gait is intact. Musculoskeletal:  Has no hand arthritis, no limitation of ROM in any of the four extremities. Lower extremities no edema          DATA:      Results for orders placed or performed in visit on 02/14/22   Vitamin B12 & Folate   Result Value Ref Range    Vitamin B-12 670 180 - 914 pg/mL    Folate >25.0 >5.8 ng/mL   Vitamin B6   Result Value Ref Range    Vitamin B6, Plasma 135 (H) 20 - 125 nmol/L          Results for orders placed during the hospital encounter of 12/28/21    MRI BRAIN WO CONTRAST    Narrative  PROCEDURE: MRI BRAIN WO CONTRAST    INDICATION:  Headache syndrome, Other irregular eye movements, Dizziness.   Left-sided head pain with left eye twitching and blurred vision. COMPARISON: MR brain dated 3/14/2014. TECHNIQUE: Multiplanar and multiple spin echo MRI images were obtained of the brain without contrast.    FINDINGS:  There is mild central volume loss which has worsened in the interval since 2014. There are mild scattered focal areas of T2/FLAIR prolongation in the periventricular, subcortical deep white matter, slightly worsened compared to prior exam. No intra or  extra-axial mass is identified. No focal areas of restricted diffusion are present. The major vascular flow voids appear patent. Orbits are unremarkable. Paranasal sinuses and mastoid air cells are clear. Impression  1. No evidence of acute intracranial abnormality. 2. Mild severity chronic microvascular angiopathy superimposed on mild volume loss which has worsened in the interval since 2014. **This report has been created using voice recognition software. It may contain minor errors which are inherent in voice recognition technology. **    Final report electronically signed by Dr. Uziel Rock MD on 12/29/2021 9:04 AM     2/14/22 1315    Vitamin B-12 180 - 914 pg/mL 670    Folate >5.8 ng/mL >25.0         Ref Range & Units 2/14/22 1315   Vitamin B6, Plasma 20 - 125 nmol/L 135 High          Impression       1. Stable MR appearance of the brain. No new abnormalities. 2. Very mild amount of abnormal signal in the white matter the brain. This is likely due to chronic small vessel ischemic changes. This could also be related to chronic headaches, prior trauma or prior inflammation. **This report has been created using voice recognition software. It may contain minor errors which are inherent in voice recognition technology. **           Final report electronically signed by Dr. Belgica Andersen on 6/7/2022 7:09 AM          Assessment:     Diagnosis Orders   1. Other headache syndrome        2. Visual disturbance        3.  Bilateral leg numbness             Follow up

## 2022-09-02 NOTE — PATIENT INSTRUCTIONS
Continue with Ubrelvy 50 mg daily as needed for breakthrough headache  EMG of the bilateral lower extremities. Keep headache diary  Follow up in 6 months or sooner if needed. Call if any questions or concerns.

## 2022-10-25 ENCOUNTER — PROCEDURE VISIT (OUTPATIENT)
Dept: NEUROLOGY | Age: 57
End: 2022-10-25
Payer: COMMERCIAL

## 2022-10-25 DIAGNOSIS — R20.0 BILATERAL LEG NUMBNESS: Primary | ICD-10-CM

## 2022-10-25 DIAGNOSIS — G62.9 NEUROPATHY: ICD-10-CM

## 2022-10-25 DIAGNOSIS — M54.16 LUMBAR RADICULOPATHY: ICD-10-CM

## 2022-10-25 PROCEDURE — 95910 NRV CNDJ TEST 7-8 STUDIES: CPT | Performed by: PSYCHIATRY & NEUROLOGY

## 2022-10-25 PROCEDURE — 95886 MUSC TEST DONE W/N TEST COMP: CPT | Performed by: PSYCHIATRY & NEUROLOGY

## 2023-03-27 ENCOUNTER — OFFICE VISIT (OUTPATIENT)
Dept: NEUROLOGY | Age: 58
End: 2023-03-27
Payer: COMMERCIAL

## 2023-03-27 VITALS
HEART RATE: 75 BPM | WEIGHT: 251 LBS | HEIGHT: 68 IN | BODY MASS INDEX: 38.04 KG/M2 | DIASTOLIC BLOOD PRESSURE: 80 MMHG | SYSTOLIC BLOOD PRESSURE: 115 MMHG | OXYGEN SATURATION: 98 %

## 2023-03-27 DIAGNOSIS — M54.16 LUMBAR RADICULOPATHY: Primary | ICD-10-CM

## 2023-03-27 DIAGNOSIS — R20.0 BILATERAL LEG NUMBNESS: ICD-10-CM

## 2023-03-27 DIAGNOSIS — G62.9 NEUROPATHY: ICD-10-CM

## 2023-03-27 DIAGNOSIS — R90.82 WHITE MATTER ABNORMALITY ON MRI OF BRAIN: ICD-10-CM

## 2023-03-27 DIAGNOSIS — G44.89 OTHER HEADACHE SYNDROME: ICD-10-CM

## 2023-03-27 PROCEDURE — G8484 FLU IMMUNIZE NO ADMIN: HCPCS | Performed by: NURSE PRACTITIONER

## 2023-03-27 PROCEDURE — 3017F COLORECTAL CA SCREEN DOC REV: CPT | Performed by: NURSE PRACTITIONER

## 2023-03-27 PROCEDURE — 99214 OFFICE O/P EST MOD 30 MIN: CPT | Performed by: NURSE PRACTITIONER

## 2023-03-27 PROCEDURE — G8427 DOCREV CUR MEDS BY ELIG CLIN: HCPCS | Performed by: NURSE PRACTITIONER

## 2023-03-27 PROCEDURE — 1036F TOBACCO NON-USER: CPT | Performed by: NURSE PRACTITIONER

## 2023-03-27 PROCEDURE — G8417 CALC BMI ABV UP PARAM F/U: HCPCS | Performed by: NURSE PRACTITIONER

## 2023-03-27 NOTE — PATIENT INSTRUCTIONS
MRI lumbar spine WO contrast  Neuropathy screening  Continue with Ubrelvy 50 mg daily as needed for breakthrough headache, samples provided in office  Follow up in 2 months or sooner if needed. Call if any questions or concerns.

## 2023-03-27 NOTE — PROGRESS NOTES
Heavy Metals Screen, Urine    Lyme Disease AB, Blood    Miscellaneous Sendout    Miscellaneous Sendout    T3, Free    T4, Free    TSH with Reflex    MRI LUMBAR SPINE WO CONTRAST      4. Other headache syndrome        5. White matter abnormality on MRI of brain             Follow up for headache, visual disturbance, bilateral leg numbness, neuropathy. She is doing the same with her headache. She is having on average 1 per month. She is on Ubrelvy which does help, no side effects noted. She has tried topamax in the past without much benefit. Her leg numbness is the same. EMG done showed bilateral L5 radiculopathy as well as mild sensory more that motor peripheral neuropathy. We will arrange for her to undergo MRI lumbar spine as well as lab work checking for causes of neuropathy. After detailed discussion with patient we agreed on the following plan. Plan:  MRI lumbar spine WO contrast  Neuropathy screening  Continue with Ubrelvy 50 mg daily as needed for breakthrough headache, samples provided in office  Follow up in 2 months or sooner if needed. Call if any questions or concerns.     Total time 32 min    Read Kindra, APRN - CNP

## 2023-03-28 RX ORDER — UBROGEPANT 50 MG/1
50 TABLET ORAL PRN
Qty: 4 TABLET | Refills: 0 | COMMUNITY
Start: 2023-03-28

## 2023-04-06 ENCOUNTER — TRANSCRIBE ORDERS (OUTPATIENT)
Dept: ADMINISTRATIVE | Age: 58
End: 2023-04-06

## 2023-04-06 DIAGNOSIS — E78.49 OTHER HYPERLIPIDEMIA: Primary | ICD-10-CM

## 2023-04-06 DIAGNOSIS — R07.9 CHEST PAIN, UNSPECIFIED TYPE: ICD-10-CM

## 2023-04-06 DIAGNOSIS — I15.9 SECONDARY HYPERTENSION: ICD-10-CM

## 2023-04-08 ENCOUNTER — HOSPITAL ENCOUNTER (OUTPATIENT)
Age: 58
Discharge: HOME | End: 2023-04-08

## 2023-04-08 DIAGNOSIS — G62.9 NEUROPATHY: ICD-10-CM

## 2023-04-08 LAB
T3FREE SERPL-MCNC: 3.4 PG/ML (ref 2.02–4.43)
T4 FREE SERPL-MCNC: 1.57 NG/DL (ref 0.93–1.76)
TSH SERPL DL<=0.005 MIU/L-ACNC: 2.37 UIU/ML (ref 0.4–4.2)

## 2023-04-20 ENCOUNTER — HOSPITAL ENCOUNTER (OUTPATIENT)
Dept: NON INVASIVE DIAGNOSTICS | Age: 58
Discharge: HOME | End: 2023-04-20
Payer: COMMERCIAL

## 2023-04-20 VITALS — HEIGHT: 68 IN | WEIGHT: 232 LBS | BODY MASS INDEX: 35.16 KG/M2

## 2023-04-20 DIAGNOSIS — I15.9 SECONDARY HYPERTENSION: ICD-10-CM

## 2023-04-20 DIAGNOSIS — E78.49 OTHER HYPERLIPIDEMIA: ICD-10-CM

## 2023-04-20 DIAGNOSIS — R07.9 CHEST PAIN, UNSPECIFIED TYPE: ICD-10-CM

## 2023-04-20 PROCEDURE — 78452 HT MUSCLE IMAGE SPECT MULT: CPT | Performed by: NUCLEAR MEDICINE

## 2023-04-20 PROCEDURE — 6360000002 HC RX W HCPCS

## 2023-04-20 PROCEDURE — 93017 CV STRESS TEST TRACING ONLY: CPT | Performed by: NUCLEAR MEDICINE

## 2023-04-20 PROCEDURE — 3430000000 HC RX DIAGNOSTIC RADIOPHARMACEUTICAL: Performed by: FAMILY MEDICINE

## 2023-04-20 PROCEDURE — A9500 TC99M SESTAMIBI: HCPCS | Performed by: FAMILY MEDICINE

## 2023-04-20 RX ORDER — TETRAKIS(2-METHOXYISOBUTYLISOCYANIDE)COPPER(I) TETRAFLUOROBORATE 1 MG/ML
9.1 INJECTION, POWDER, LYOPHILIZED, FOR SOLUTION INTRAVENOUS
Status: COMPLETED | OUTPATIENT
Start: 2023-04-20 | End: 2023-04-20

## 2023-04-20 RX ORDER — TETRAKIS(2-METHOXYISOBUTYLISOCYANIDE)COPPER(I) TETRAFLUOROBORATE 1 MG/ML
29.9 INJECTION, POWDER, LYOPHILIZED, FOR SOLUTION INTRAVENOUS
Status: COMPLETED | OUTPATIENT
Start: 2023-04-20 | End: 2023-04-20

## 2023-04-20 RX ADMIN — Medication 29.9 MILLICURIE: at 08:30

## 2023-04-20 RX ADMIN — Medication 9.1 MILLICURIE: at 07:37

## 2023-05-02 ENCOUNTER — HOSPITAL ENCOUNTER (OUTPATIENT)
Dept: WOMENS IMAGING | Age: 58
Discharge: HOME | End: 2023-05-02
Payer: COMMERCIAL

## 2023-05-02 DIAGNOSIS — Z12.31 VISIT FOR SCREENING MAMMOGRAM: ICD-10-CM

## 2023-05-02 PROCEDURE — 77063 BREAST TOMOSYNTHESIS BI: CPT

## 2023-05-09 ENCOUNTER — HOSPITAL ENCOUNTER (OUTPATIENT)
Dept: WOMENS IMAGING | Age: 58
Discharge: HOME | End: 2023-05-09
Payer: COMMERCIAL

## 2023-05-09 DIAGNOSIS — R92.2 BREAST DENSITY: ICD-10-CM

## 2023-05-09 PROCEDURE — 76642 ULTRASOUND BREAST LIMITED: CPT

## 2023-12-11 ENCOUNTER — OFFICE VISIT (OUTPATIENT)
Dept: NEUROLOGY | Age: 58
End: 2023-12-11
Payer: COMMERCIAL

## 2023-12-11 VITALS
OXYGEN SATURATION: 98 % | WEIGHT: 259 LBS | BODY MASS INDEX: 40.65 KG/M2 | DIASTOLIC BLOOD PRESSURE: 80 MMHG | SYSTOLIC BLOOD PRESSURE: 130 MMHG | HEIGHT: 67 IN | HEART RATE: 81 BPM

## 2023-12-11 DIAGNOSIS — M54.16 LUMBAR RADICULOPATHY: ICD-10-CM

## 2023-12-11 DIAGNOSIS — G62.9 NEUROPATHY: ICD-10-CM

## 2023-12-11 DIAGNOSIS — G44.89 OTHER HEADACHE SYNDROME: Primary | ICD-10-CM

## 2023-12-11 DIAGNOSIS — R90.82 WHITE MATTER ABNORMALITY ON MRI OF BRAIN: ICD-10-CM

## 2023-12-11 DIAGNOSIS — R20.0 BILATERAL LEG NUMBNESS: ICD-10-CM

## 2023-12-11 PROCEDURE — G8417 CALC BMI ABV UP PARAM F/U: HCPCS | Performed by: NURSE PRACTITIONER

## 2023-12-11 PROCEDURE — 1036F TOBACCO NON-USER: CPT | Performed by: NURSE PRACTITIONER

## 2023-12-11 PROCEDURE — G8484 FLU IMMUNIZE NO ADMIN: HCPCS | Performed by: NURSE PRACTITIONER

## 2023-12-11 PROCEDURE — G8427 DOCREV CUR MEDS BY ELIG CLIN: HCPCS | Performed by: NURSE PRACTITIONER

## 2023-12-11 PROCEDURE — 99213 OFFICE O/P EST LOW 20 MIN: CPT | Performed by: NURSE PRACTITIONER

## 2023-12-11 PROCEDURE — 3017F COLORECTAL CA SCREEN DOC REV: CPT | Performed by: NURSE PRACTITIONER

## 2023-12-11 RX ORDER — UBROGEPANT 50 MG/1
50 TABLET ORAL PRN
Qty: 8 TABLET | Refills: 3 | Status: SHIPPED | OUTPATIENT
Start: 2023-12-11

## 2023-12-11 NOTE — PATIENT INSTRUCTIONS
Continue with Ubrelvy 50 mg daily as needed for breakthrough headache. Refills given  Follow up in 6 months or sooner if needed. Call if any questions or concerns.

## 2023-12-11 NOTE — PROGRESS NOTES
NEUROLOGY OUT PATIENT FOLLOW UP NOTE:  12/11/20232:54 PM    Desirae Celis is here for follow up for headache. Allergies   Allergen Reactions    Sulfa Antibiotics Rash    Demerol      BP decrease, dizziness    Wellbutrin [Bupropion] Other (See Comments)     Panic Attacks    Amoxicillin-Pot Clavulanate Rash       Current Outpatient Medications:     Ubrogepant (UBRELVY) 50 MG TABS, Take 50 mg by mouth as needed (headache), Disp: 4 tablet, Rfl: 0    losartan (COZAAR) 50 MG tablet, Take 1 tablet by mouth daily, Disp: , Rfl:     phentermine 37.5 MG capsule, Take 1 capsule by mouth every morning., Disp: , Rfl:     Cholecalciferol (VITAMIN D) 25 MCG TABS, Take 1 tablet by mouth daily, Disp: 60 tablet, Rfl: 0    escitalopram (LEXAPRO) 10 MG tablet, Take 2 tablets by mouth daily, Disp: , Rfl:     triamterene-hydroCHLOROthiazide (MAXZIDE-25) 37.5-25 MG per tablet, Take 0.5 tablets by mouth daily, Disp: , Rfl:     Ascorbic Acid (VITAMIN C) 1000 MG tablet, Take 2 tablets by mouth nightly, Disp: , Rfl:     CALCIUM-MAGNESIUM-ZINC PO, Take 2 tablets by mouth nightly, Disp: , Rfl:     Omega-3 1000 MG CAPS, Take 1 capsule by mouth nightly, Disp: , Rfl:     omeprazole (PRILOSEC) 40 MG delayed release capsule, Take 1 capsule by mouth daily, Disp: , Rfl:     LORazepam (ATIVAN) 0.5 MG tablet, Take 1 tablet by mouth daily as needed for Anxiety, Disp: 10 tablet, Rfl: 0    simvastatin (ZOCOR) 20 MG tablet, Take 1 tablet by mouth every evening., Disp: 90 tablet, Rfl: 2    Coenzyme Q10 (CO Q-10) 400 MG CAPS, Take 100 mg by mouth nightly , Disp: , Rfl:     Probiotic Product (PROBIOTIC PEARLS PO), Take  by mouth daily. , Disp: , Rfl:     aspirin 81 MG EC tablet, Take 1 tablet by mouth daily, Disp: , Rfl:     I reviewed the past medical history, allergies, medications, social history and family history.        PE:   Vitals:    12/11/23 1452   BP: 130/80   Pulse: 81   SpO2: 98%   Weight: 117.5 kg (259 lb)   Height: 1.702 m (5' 7\")

## 2023-12-27 ENCOUNTER — HOSPITAL ENCOUNTER (OUTPATIENT)
Dept: INTERVENTIONAL RADIOLOGY/VASCULAR | Age: 58
Discharge: HOME | End: 2023-12-29

## 2023-12-27 DIAGNOSIS — Z13.6 ENCOUNTER FOR SCREENING FOR VASCULAR DISEASE: ICD-10-CM

## 2023-12-27 PROCEDURE — 9900000021 US VASCULAR SCREENING

## 2024-05-13 ENCOUNTER — HOSPITAL ENCOUNTER (OUTPATIENT)
Dept: WOMENS IMAGING | Age: 59
Discharge: HOME | End: 2024-05-13
Payer: COMMERCIAL

## 2024-05-13 DIAGNOSIS — Z12.31 VISIT FOR SCREENING MAMMOGRAM: ICD-10-CM

## 2024-05-13 PROCEDURE — 77063 BREAST TOMOSYNTHESIS BI: CPT

## 2024-07-12 ENCOUNTER — OFFICE VISIT (OUTPATIENT)
Dept: NEUROLOGY | Age: 59
End: 2024-07-12
Payer: COMMERCIAL

## 2024-07-12 VITALS
HEIGHT: 67 IN | WEIGHT: 252 LBS | HEART RATE: 93 BPM | SYSTOLIC BLOOD PRESSURE: 123 MMHG | BODY MASS INDEX: 39.55 KG/M2 | OXYGEN SATURATION: 98 % | DIASTOLIC BLOOD PRESSURE: 70 MMHG

## 2024-07-12 DIAGNOSIS — R20.0 BILATERAL LEG NUMBNESS: ICD-10-CM

## 2024-07-12 DIAGNOSIS — H53.9 VISUAL DISTURBANCE: ICD-10-CM

## 2024-07-12 DIAGNOSIS — R90.82 WHITE MATTER ABNORMALITY ON MRI OF BRAIN: ICD-10-CM

## 2024-07-12 DIAGNOSIS — G62.9 NEUROPATHY: ICD-10-CM

## 2024-07-12 DIAGNOSIS — G44.89 OTHER HEADACHE SYNDROME: Primary | ICD-10-CM

## 2024-07-12 PROCEDURE — 99213 OFFICE O/P EST LOW 20 MIN: CPT | Performed by: NURSE PRACTITIONER

## 2024-07-12 RX ORDER — UBROGEPANT 50 MG/1
50 TABLET ORAL DAILY PRN
Qty: 4 TABLET | Refills: 0 | Status: SHIPPED | COMMUNITY
Start: 2024-07-12

## 2024-07-12 RX ORDER — UBROGEPANT 50 MG/1
50 TABLET ORAL PRN
Qty: 8 TABLET | Refills: 3 | Status: SHIPPED | OUTPATIENT
Start: 2024-07-12

## 2024-07-12 NOTE — PROGRESS NOTES
has worsened in the interval since 2014. There are mild scattered focal areas of T2/FLAIR prolongation in the periventricular, subcortical deep white matter, slightly worsened compared to prior exam. No intra or  extra-axial mass is identified. No focal areas of restricted diffusion are present.    The major vascular flow voids appear patent. Orbits are unremarkable. Paranasal sinuses and mastoid air cells are clear.    Impression  1. No evidence of acute intracranial abnormality.  2. Mild severity chronic microvascular angiopathy superimposed on mild volume loss which has worsened in the interval since 2014.          **This report has been created using voice recognition software. It may contain minor errors which are inherent in voice recognition technology.**    Final report electronically signed by Dr. Kev Taylor DO, MD on 12/29/2021 9:04 AM    Results for orders placed during the hospital encounter of 06/06/22    MRI BRAIN W WO CONTRAST    Narrative  PROCEDURE: MRI BRAIN W WO CONTRAST    CLINICAL INFORMATIONOther headache syndrome, Visual disturbance, White matter abnormality on MRI of brain. Headaches for 7 months. Follow-up.    COMPARISON: Brain MRI 12/28/2021.    TECHNIQUE: Multiplanar and multiple spin echo T1 and T2-weighted images were obtained through the brain before and after the administration of intravenous contrast.    FINDINGS:        The diffusion-weighted images are normal. The brain volume is assessed as stable..There are no intra-or extra-axial collections.  There is no hydrocephalus, midline shift or mass effect.    On the FLAIR and T2-weighted sequences, there is mild signal hyperintensity scattered in the white matter of the brain. This is most consistent with mild severity chronic small vessel ischemic changes. This is stable. There is no new abnormal signal.    On the gradient echo T2-weighted images, there is no susceptibility artifact present.    There is no abnormal enhancement in

## 2024-07-12 NOTE — PATIENT INSTRUCTIONS
Continue with Ubrelvy 50 mg daily as needed for breakthrough headache, samples provided today in office  Follow up in 12 months or sooner if needed.   Call if any questions or concerns.

## 2024-10-23 ENCOUNTER — HOSPITAL ENCOUNTER (OUTPATIENT)
Age: 59
Discharge: HOME | End: 2024-10-23
Payer: COMMERCIAL

## 2024-10-23 ENCOUNTER — HOSPITAL ENCOUNTER (OUTPATIENT)
Dept: GENERAL RADIOLOGY | Age: 59
Discharge: HOME | End: 2024-10-23
Payer: COMMERCIAL

## 2024-10-23 DIAGNOSIS — M25.511 PAIN IN JOINT OF RIGHT SHOULDER: ICD-10-CM

## 2024-10-23 PROCEDURE — 73030 X-RAY EXAM OF SHOULDER: CPT

## 2024-11-25 ENCOUNTER — HOSPITAL ENCOUNTER (OUTPATIENT)
Dept: MRI IMAGING | Age: 59
Discharge: HOME | End: 2024-11-25
Attending: FAMILY MEDICINE
Payer: COMMERCIAL

## 2024-11-25 DIAGNOSIS — M25.511 RIGHT SHOULDER PAIN, UNSPECIFIED CHRONICITY: ICD-10-CM

## 2024-11-25 PROCEDURE — 73221 MRI JOINT UPR EXTREM W/O DYE: CPT

## 2025-01-14 ENCOUNTER — HOSPITAL ENCOUNTER (OUTPATIENT)
Age: 60
Discharge: HOME | End: 2025-01-14
Payer: COMMERCIAL

## 2025-01-14 LAB
ANION GAP SERPL CALC-SCNC: 11 MEQ/L (ref 8–16)
BASOPHILS ABSOLUTE: 0.1 THOU/MM3 (ref 0–0.1)
BASOPHILS NFR BLD AUTO: 0.8 %
BUN SERPL-MCNC: 23 MG/DL (ref 7–22)
CALCIUM SERPL-MCNC: 9.3 MG/DL (ref 8.5–10.5)
CHLORIDE SERPL-SCNC: 101 MEQ/L (ref 98–111)
CO2 SERPL-SCNC: 30 MEQ/L (ref 23–33)
CREAT SERPL-MCNC: 0.7 MG/DL (ref 0.4–1.2)
DEPRECATED RDW RBC AUTO: 41.5 FL (ref 35–45)
EOSINOPHIL NFR BLD AUTO: 2.5 %
EOSINOPHILS ABSOLUTE: 0.2 THOU/MM3 (ref 0–0.4)
ERYTHROCYTE [DISTWIDTH] IN BLOOD BY AUTOMATED COUNT: 12.8 % (ref 11.5–14.5)
GFR SERPL CREATININE-BSD FRML MDRD: > 90 ML/MIN/1.73M2
GLUCOSE SERPL-MCNC: 94 MG/DL (ref 70–108)
HCT VFR BLD AUTO: 40.1 % (ref 37–47)
HGB BLD-MCNC: 13.1 GM/DL (ref 12–16)
IMM GRANULOCYTES # BLD AUTO: 0.02 THOU/MM3 (ref 0–0.07)
IMM GRANULOCYTES NFR BLD AUTO: 0.3 %
LYMPHOCYTES ABSOLUTE: 2.9 THOU/MM3 (ref 1–4.8)
LYMPHOCYTES NFR BLD AUTO: 40.4 %
MCH RBC QN AUTO: 29.1 PG (ref 26–33)
MCHC RBC AUTO-ENTMCNC: 32.7 GM/DL (ref 32.2–35.5)
MCV RBC AUTO: 89.1 FL (ref 81–99)
MONOCYTES ABSOLUTE: 0.5 THOU/MM3 (ref 0.4–1.3)
MONOCYTES NFR BLD AUTO: 6.8 %
NEUTROPHILS ABSOLUTE: 3.5 THOU/MM3 (ref 1.8–7.7)
NEUTROPHILS NFR BLD AUTO: 49.2 %
NRBC BLD AUTO-RTO: 0 /100 WBC
PLATELET # BLD AUTO: 280 THOU/MM3 (ref 130–400)
PMV BLD AUTO: 10.5 FL (ref 9.4–12.4)
POTASSIUM SERPL-SCNC: 3.7 MEQ/L (ref 3.5–5.2)
RBC # BLD AUTO: 4.5 MILL/MM3 (ref 4.2–5.4)
SODIUM SERPL-SCNC: 142 MEQ/L (ref 135–145)
WBC # BLD AUTO: 7.2 THOU/MM3 (ref 4.8–10.8)

## 2025-01-14 PROCEDURE — 36415 COLL VENOUS BLD VENIPUNCTURE: CPT

## 2025-01-14 PROCEDURE — 85025 COMPLETE CBC W/AUTO DIFF WBC: CPT

## 2025-01-14 PROCEDURE — 80048 BASIC METABOLIC PNL TOTAL CA: CPT

## 2025-01-15 ENCOUNTER — HOSPITAL ENCOUNTER (OUTPATIENT)
Age: 60
Discharge: HOME | End: 2025-01-15
Payer: COMMERCIAL

## 2025-01-15 PROCEDURE — 93005 ELECTROCARDIOGRAM TRACING: CPT | Performed by: ORTHOPAEDIC SURGERY

## 2025-01-16 LAB
EKG ATRIAL RATE: 76 BPM
EKG P AXIS: 30 DEGREES
EKG P-R INTERVAL: 142 MS
EKG Q-T INTERVAL: 394 MS
EKG QRS DURATION: 82 MS
EKG QTC CALCULATION (BAZETT): 443 MS
EKG R AXIS: -10 DEGREES
EKG T AXIS: 36 DEGREES
EKG VENTRICULAR RATE: 76 BPM

## 2025-01-16 PROCEDURE — 93010 ELECTROCARDIOGRAM REPORT: CPT | Performed by: INTERNAL MEDICINE

## 2025-01-27 ENCOUNTER — HOSPITAL ENCOUNTER (OUTPATIENT)
Dept: OCCUPATIONAL THERAPY | Age: 60
Setting detail: THERAPIES SERIES
Discharge: HOME | End: 2025-01-27
Payer: COMMERCIAL

## 2025-01-27 PROCEDURE — 97165 OT EVAL LOW COMPLEX 30 MIN: CPT

## 2025-01-27 NOTE — PROGRESS NOTES
[facility]  OCCUPATIONAL THERAPY  [x] EVALUATION  [] DAILY NOTE (LAND) [] DAILY NOTE (AQUATIC ) [] PROGRESS NOTE [] DISCHARGE NOTE    [x] OUTPATIENT REHABILITATION CENTER [facility]   [] [place] [facility]    [] [facility]   [] [facility]    Date: 2025  Patient Name:  Johanny Uriarte  : 1965  MRN: [id number]  CSN: [id number]    Referring Practitioner Kev Sunshine MD [id number]      Diagnosis  Diagnoses       M75.121 (ICD-10-CM) - Complete rotator cuff tear or rupture of right shoulder, not specified as traumatic           Treatment Diagnosis M25.511  Right Shoulder Pain  M25.611 Stiffness of Right Shoulder   Date of Evaluation 25   Additional Pertinent History Johanny Uriarte has a past medical history of Anxiety, Depression, Hyperlipidemia, Hypertension, Left-sided Bell's palsy, Smoker, and Wears dentures.  she has a past surgical history that includes  section; Hysterectomy (); Endoscopy, colon, diagnostic; Knee arthroscopy (Bilateral); Dental surgery; egd colonoscopy (Left, 10/21/2019); Ovary removal (Bilateral, ); and [facility] STEREO BREAST BX W LOC DEVICE 1ST LESION LEFT (Left, 2014).     Allergies Allergies   Allergen Reactions    Sulfa Antibiotics Rash    Demerol      BP decrease, dizziness    Wellbutrin [Bupropion] Other (See Comments)     Panic Attacks    Amoxicillin-Pot Clavulanate Rash      Medications   Current Outpatient Medications:     Ubrogepant (UBRELVY) 50 MG TABS, Take 50 mg by mouth as needed (headache), Disp: 8 tablet, Rfl: 3    Ubrogepant (UBRELVY) 50 MG TABS, Take 50 mg by mouth daily as needed (FOR HEADACHES), Disp: 4 tablet, Rfl: 0    losartan (COZAAR) 50 MG tablet, Take 1 tablet by mouth daily, Disp: , Rfl:     phentermine 37.5 MG capsule, Take 1 capsule by mouth every morning., Disp: , Rfl:     Cholecalciferol (VITAMIN D) 25 MCG TABS, Take 1 tablet by mouth daily, Disp: 60 tablet, Rfl: 0    escitalopram

## 2025-01-27 NOTE — PLAN OF CARE
Not a duplicate.     Patient called her insurance on 01/25/25 to find out why she hadn't received her prescription. She was told that the order had been cancelled and that a new prescription had to be sent. She is out of medication and wants a hold-over supply to go to her local [facility] and the 90-day supply to be resent to [facility] mail order.    Reason for call:   [x] Refill   [] Prior Auth  [x] Other: resend 90-day + hold-over supply    Office:   [x] PCP/Provider - CARIDAD Colin / Rex    Medication: clonazepam    Dose/Frequency: 0.5mg qhs    Quantity: 10 @ [facility]/pharmacy #[id number] - [place] - [address]     90 @ [facility] - [place] - [address] [facility]     Does the patient have enough for 3 days?   [] Yes   [x] No - Send as HP to POD     minimal help this shift. Problem: Falls - Risk of:  Goal: Will remain free from falls  Description: Will remain free from falls  4/28/2021 1050 by Zeina Patiño RN  Outcome: Ongoing  Note: Patient free from falls this shift. Patient uses call light appropriately, bed in lowest position, nonskid socks on, bed rails up x2, fall sign posted and call light in reach. Patient at risk due to generalized weakness. PT/OT on case. Care plan reviewed with patient and family. Patient and family verbalize understanding of the plan of care and contribute to goal setting.

## 2025-02-03 ENCOUNTER — HOSPITAL ENCOUNTER (OUTPATIENT)
Dept: OCCUPATIONAL THERAPY | Age: 60
Setting detail: THERAPIES SERIES
Discharge: HOME | End: 2025-02-03
Payer: COMMERCIAL

## 2025-02-03 PROCEDURE — 97110 THERAPEUTIC EXERCISES: CPT

## 2025-02-03 NOTE — PROGRESS NOTES
[facility]  OCCUPATIONAL THERAPY  [] EVALUATION  [] DAILY NOTE (LAND) [] DAILY NOTE (AQUATIC ) [] PROGRESS NOTE [] DISCHARGE NOTE    [] OUTPATIENT [facility]HABILITATION CENTER [place]   [x] [place] [facility]    [] [facility]   [] [facility]    Date: 2/3/2025  Patient Name:  Johanny Uriarte  : 1965  MRN: [id number]  CSN: [id number]    Referring Practitioner Kev Sunshine MD [id number]      Diagnosis  Diagnoses       M75.121 (ICD-10-CM) - Complete rotator cuff tear or rupture of right shoulder, not specified as traumatic           Treatment Diagnosis M25.511  Right Shoulder Pain  M25.611 Stiffness of Right Shoulder   Date of Evaluation 25   Additional Pertinent History Johanny Uriarte has a past medical history of Anxiety, Depression, Hyperlipidemia, Hypertension, Left-sided Bell's palsy, Smoker, and Wears dentures.  she has a past surgical history that includes  section; Hysterectomy (); Endoscopy, colon, diagnostic; Knee arthroscopy (Bilateral); Dental surgery; egd colonoscopy (Left, 10/21/2019); Ovary removal (Bilateral, ); and [facility] STEREO BREAST BX W LOC DEVICE 1ST LESION LEFT (Left, 2014).     Allergies Allergies   Allergen Reactions    Sulfa Antibiotics Rash    Demerol      BP decrease, dizziness    Wellbutrin [Bupropion] Other (See Comments)     Panic Attacks    Amoxicillin-Pot Clavulanate Rash      Medications   Current Outpatient Medications:     Ubrogepant (UBRELVY) 50 MG TABS, Take 50 mg by mouth as needed (headache), Disp: 8 tablet, Rfl: 3    Ubrogepant (UBRELVY) 50 MG TABS, Take 50 mg by mouth daily as needed (FOR HEADACHES), Disp: 4 tablet, Rfl: 0    losartan (COZAAR) 50 MG tablet, Take 1 tablet by mouth daily, Disp: , Rfl:     phentermine 37.5 MG capsule, Take 1 capsule by mouth every morning., Disp: , Rfl:     Cholecalciferol (VITAMIN D) 25 MCG TABS, Take 1 tablet by mouth daily, Disp: 60 tablet, Rfl: 0    escitalopram

## 2025-02-10 ENCOUNTER — HOSPITAL ENCOUNTER (OUTPATIENT)
Dept: OCCUPATIONAL THERAPY | Age: 60
Setting detail: THERAPIES SERIES
Discharge: HOME | End: 2025-02-10
Payer: COMMERCIAL

## 2025-02-10 PROCEDURE — 97110 THERAPEUTIC EXERCISES: CPT

## 2025-02-10 NOTE — PROGRESS NOTES
[facility]  OCCUPATIONAL THERAPY  [] EVALUATION  [] DAILY NOTE (LAND) [] DAILY NOTE (AQUATIC ) [] PROGRESS NOTE [] DISCHARGE NOTE    [] OUTPATIENT [facility]HABILITATION CENTER - [place]   [x] [place] AMBULATORY [facility]ARE [facility]    [] [facility]   [] [facility]    Date: 2/10/2025  Patient Name:  Johanny Uriarte  : 1965  MRN: [id number]  CSN: [id number]    Referring Practitioner Kev Sunshine MD [id number]      Diagnosis  Diagnoses       M75.121 (ICD-10-CM) - Complete rotator cuff tear or rupture of right shoulder, not specified as traumatic           Treatment Diagnosis M25.511  Right Shoulder Pain  M25.611 Stiffness of Right Shoulder   Date of Evaluation 25   Additional Pertinent History Johanny Uriarte has a past medical history of Anxiety, Depression, Hyperlipidemia, Hypertension, Left-sided Bell's palsy, Smoker, and Wears dentures.  she has a past surgical history that includes  section; Hysterectomy (); Endoscopy, colon, diagnostic; Knee arthroscopy (Bilateral); Dental surgery; egd colonoscopy (Left, 10/21/2019); Ovary removal (Bilateral, ); and [facility] STEREO BREAST BX W LOC DEVICE 1ST LESION LEFT (Left, 2014).     Allergies Allergies   Allergen Reactions    Sulfa Antibiotics Rash    Demerol      BP decrease, dizziness    Wellbutrin [Bupropion] Other (See Comments)     Panic Attacks    Amoxicillin-Pot Clavulanate Rash      Medications   Current Outpatient Medications:     Ubrogepant (UBRELVY) 50 MG TABS, Take 50 mg by mouth as needed (headache), Disp: 8 tablet, Rfl: 3    Ubrogepant (UBRELVY) 50 MG TABS, Take 50 mg by mouth daily as needed (FOR HEADACHES), Disp: 4 tablet, Rfl: 0    losartan (COZAAR) 50 MG tablet, Take 1 tablet by mouth daily, Disp: , Rfl:     phentermine 37.5 MG capsule, Take 1 capsule by mouth every morning., Disp: , Rfl:     Cholecalciferol (VITAMIN D) 25 MCG TABS, Take 1 tablet by mouth daily, Disp: 60 tablet, Rfl: 0    escitalopram

## 2025-02-17 ENCOUNTER — HOSPITAL ENCOUNTER (OUTPATIENT)
Dept: OCCUPATIONAL THERAPY | Age: 60
Setting detail: THERAPIES SERIES
Discharge: HOME | End: 2025-02-17
Payer: COMMERCIAL

## 2025-02-17 PROCEDURE — 97110 THERAPEUTIC EXERCISES: CPT

## 2025-02-17 NOTE — PROGRESS NOTES
[facility]  OCCUPATIONAL THERAPY  [] EVALUATION  [] DAILY NOTE (LAND) [] DAILY NOTE (AQUATIC ) [] PROGRESS NOTE [] DISCHARGE NOTE    [] OUTPATIENT REHABILITATION CENTER - [place]   [x] [place] AM[facility]ULATORY CARE CENTER    [] [facility]   [] [facility]    Date: 2025  Patient Name:  Johanny Uriarte  : 1965  MRN: [id number]  CSN: [id number]    Referring Practitioner Kev Sunshine MD [id number]      Diagnosis  Diagnoses       M75.121 (ICD-10-CM) - Complete rotator cuff tear or rupture of right shoulder, not specified as traumatic           Treatment Diagnosis M25.511  Right Shoulder Pain  M25.611 Stiffness of Right Shoulder   Date of Evaluation 25   Additional Pertinent History Johanny Uriarte has a past medical history of Anxiety, Depression, Hyperlipidemia, Hypertension, Left-sided Bell's palsy, Smoker, and Wears dentures.  she has a past surgical history that includes  section; Hysterectomy (); Endoscopy, colon, diagnostic; Knee arthroscopy (Bilateral); Dental surgery; egd colonoscopy (Left, 10/21/2019); Ovary removal (Bilateral, ); and [facility] STEREO BREAST BX W LOC DEVICE 1ST LESION LEFT (Left, 2014).     Allergies Allergies   Allergen Reactions    Sulfa Antibiotics Rash    Demerol      BP decrease, dizziness    Wellbutrin [Bupropion] Other (See Comments)     Panic Attacks    Amoxicillin-Pot Clavulanate Rash      Medications   Current Outpatient Medications:     Ubrogepant (UBRELVY) 50 MG TABS, Take 50 mg by mouth as needed (headache), Disp: 8 tablet, Rfl: 3    Ubrogepant (UBRELVY) 50 MG TABS, Take 50 mg by mouth daily as needed (FOR HEADACHES), Disp: 4 tablet, Rfl: 0    losartan (COZAAR) 50 MG tablet, Take 1 tablet by mouth daily, Disp: , Rfl:     phentermine 37.5 MG capsule, Take 1 capsule by mouth every morning., Disp: , Rfl:     Cholecalciferol (VITAMIN D) 25 MCG TABS, Take 1 tablet by mouth daily, Disp: 60 tablet, Rfl: 0    escitalopram

## 2025-02-24 ENCOUNTER — HOSPITAL ENCOUNTER (OUTPATIENT)
Dept: OCCUPATIONAL THERAPY | Age: 60
Setting detail: THERAPIES SERIES
Discharge: HOME | End: 2025-02-24
Payer: COMMERCIAL

## 2025-02-24 PROCEDURE — 97110 THERAPEUTIC EXERCISES: CPT

## 2025-02-24 NOTE — PROGRESS NOTES
[facility]  OCCUPATIONAL THERAPY  [] EVALUATION  [] DAILY NOTE (LAND) [] DAILY NOTE (AQUATIC ) [] PROGRESS NOTE [] DISCHARGE NOTE    [] OUTPATIENT REHABILITATION CENTER - [place]   [x] [place] [facility]ULATORY CARE CENTER    [] [facility]   [] [facility]    Date: 2025  Patient Name:  Johanny Uriarte  : 1965  MRN: [id number]  CSN: [id number]    Referring Practitioner Kev Sunshine MD [id number]      Diagnosis  Diagnoses       M75.121 (ICD-10-CM) - Complete rotator cuff tear or rupture of right shoulder, not specified as traumatic           Treatment Diagnosis M25.511  Right Shoulder Pain  M25.611 Stiffness of Right Shoulder   Date of Evaluation 25   Additional Pertinent History Johanny Uriarte has a past medical history of Anxiety, Depression, Hyperlipidemia, Hypertension, Left-sided Bell's palsy, Smoker, and Wears dentures.  she has a past surgical history that includes  section; Hysterectomy (); Endoscopy, colon, diagnostic; Knee arthroscopy (Bilateral); Dental surgery; egd colonoscopy (Left, 10/21/2019); Ovary removal (Bilateral, ); and [facility] STEREO BREAST BX W LOC DEVICE 1ST LESION LEFT (Left, 2014).     Allergies Allergies   Allergen Reactions    Sulfa Antibiotics Rash    Demerol      BP decrease, dizziness    Wellbutrin [Bupropion] Other (See Comments)     Panic Attacks    Amoxicillin-Pot Clavulanate Rash      Medications   Current Outpatient Medications:     Ubrogepant (UBRELVY) 50 MG TABS, Take 50 mg by mouth as needed (headache), Disp: 8 tablet, Rfl: 3    Ubrogepant (UBRELVY) 50 MG TABS, Take 50 mg by mouth daily as needed (FOR HEADACHES), Disp: 4 tablet, Rfl: 0    losartan (COZAAR) 50 MG tablet, Take 1 tablet by mouth daily, Disp: , Rfl:     phentermine 37.5 MG capsule, Take 1 capsule by mouth every morning., Disp: , Rfl:     Cholecalciferol (VITAMIN D) 25 MCG TABS, Take 1 tablet by mouth daily, Disp: 60 tablet, Rfl: 0    escitalopram

## 2025-03-03 ENCOUNTER — HOSPITAL ENCOUNTER (OUTPATIENT)
Dept: OCCUPATIONAL THERAPY | Age: 60
Setting detail: THERAPIES SERIES
Discharge: HOME | End: 2025-03-03
Payer: COMMERCIAL

## 2025-03-03 PROCEDURE — 97110 THERAPEUTIC EXERCISES: CPT

## 2025-03-03 NOTE — PROGRESS NOTES
SUBJECTIVE:   Reports that she is having periods of no pain.  Reports that she feels pretty good this morning.      OBJECTIVE:  TREATMENT   Precautions: massive RC tear, RCR with bicep tenotomy on 1/23/25    Pain: 0/10 R shoulder     “X” in shaded column indicates Activity Completed Today   Modalities Parameters/  Location  Notes/Comments                     Manual Therapy Time/  Technique  Notes/Comments   Gentle vibration to relax upper trap  x Patient reports pain in this location, difficult time relaxing in the sling.   STM to distal bicep, belly of bicep, proximal forearm  x To decrease tightness and associated pain         Exercises   Sets/  Sec Reps  Notes/Comments   Scapular exercises  Retraction  Bwd shoulder rolls     X  x    A/AROM   Elbow flexion/extension  Supination/pronation  Fist pumps     X  X  x    Table slides within protocol parameter of 90.    x    Supine PROM per protocol   x Using relaxation technique of deep breathing and visual imagery to relax   pulleys   x Greatly increased tolerance for pulleys                 Activities Time    Notes/Comments   Review protocol      Sling adjustment to increase ability to relax in sling  x            Specific Interventions Next Treatment: Per [facility] protocol for RCR, DOS 1/23/25.    0 - 6 WEEKS DATE OF SURGERY: Immobililze in sling PROM Shoulder Flexion and External Rotation Limit flexion ROM to 90 degrees or less Limit external rotation ROM to 20 degrees or less Table Slide -shoulder flexion to 90 degrees External rotation stretch in supine with towel under elbow, passive stretch with dowel bubba Limit external rotation to 20 degrees or less Forward flexion with pulley Limit flexion ROM to 90 degrees or less AROM of elbow, forearm, wrist and hand with elbow at side Active scapular exercises (shrugs, retraction) Grade I and II glenohumeral/scapulothoracic joint mobs for pain control Modalities PRN Scar management following staple removal All exercises

## 2025-03-07 ENCOUNTER — HOSPITAL ENCOUNTER (OUTPATIENT)
Dept: OCCUPATIONAL THERAPY | Age: 60
Setting detail: THERAPIES SERIES
Discharge: HOME | End: 2025-03-07
Payer: COMMERCIAL

## 2025-03-07 PROCEDURE — 97110 THERAPEUTIC EXERCISES: CPT

## 2025-03-07 NOTE — PROGRESS NOTES
[facility]  OCCUPATIONAL THERAPY  [] EVALUATION  [] DAILY NOTE (LAND) [] DAILY NOTE (AQUATIC ) [] PROGRESS NOTE [] DISCHARGE NOTE    [] [facility] [place]   [x] [place] [facility]    [] [facility]   [] [facility]    Date: 3/7/2025  Patient Name:  Johanny Uriarte  : 1965  MRN: [id number]  CSN: [id number]    Referring Practitioner Kev Susnhine MD [id number]      Diagnosis  Diagnoses       M75.121 (ICD-10-CM) - Complete rotator cuff tear or rupture of right shoulder, not specified as traumatic           Treatment Diagnosis M25.511  Right Shoulder Pain  M25.611 Stiffness of Right Shoulder   Date of Evaluation 25   Additional Pertinent History Johanny Uriarte has a past medical history of Anxiety, Depression, Hyperlipidemia, Hypertension, Left-sided Bell's palsy, Smoker, and Wears dentures.  she has a past surgical history that includes  section; Hysterectomy (); Endoscopy, colon, diagnostic; Knee arthroscopy (Bilateral); Dental surgery; egd colonoscopy (Left, 10/21/2019); Ovary removal (Bilateral, ); and [facility] STEREO BREAST BX W LOC DEVICE 1ST LESION LEFT (Left, 2014).     Allergies Allergies   Allergen Reactions    Sulfa Antibiotics Rash    Demerol      BP decrease, dizziness    Wellbutrin [Bupropion] Other (See Comments)     Panic Attacks    Amoxicillin-Pot Clavulanate Rash      Medications   Current Outpatient Medications:     Ubrogepant (UBRELVY) 50 MG TABS, Take 50 mg by mouth as needed (headache), Disp: 8 tablet, Rfl: 3    Ubrogepant (UBRELVY) 50 MG TABS, Take 50 mg by mouth daily as needed (FOR HEADACHES), Disp: 4 tablet, Rfl: 0    losartan (COZAAR) 50 MG tablet, Take 1 tablet by mouth daily, Disp: , Rfl:     phentermine 37.5 MG capsule, Take 1 capsule by mouth every morning., Disp: , Rfl:     Cholecalciferol (VITAMIN D) 25 MCG TABS, Take 1 tablet by mouth daily, Disp: 60 tablet, Rfl: 0    escitalopram

## 2025-03-10 ENCOUNTER — HOSPITAL ENCOUNTER (OUTPATIENT)
Dept: OCCUPATIONAL THERAPY | Age: 60
Setting detail: THERAPIES SERIES
Discharge: HOME | End: 2025-03-10
Payer: COMMERCIAL

## 2025-03-10 PROCEDURE — 97110 THERAPEUTIC EXERCISES: CPT

## 2025-03-10 NOTE — PROGRESS NOTES
with bicep tenotomy on 1/23/25.   Fell last September, injuring shoulder.       SUBJECTIVE:   Reports that she has been weaning from her sling.      OBJECTIVE:  TREATMENT   Precautions: massive RC tear, RCR with bicep tenotomy on 1/23/25 Bolster under knees   Pain: 6/10 R shoulder     “X” in shaded column indicates Activity Completed Today   Modalities Parameters/  Location  Notes/Comments   MHP during PROM      Ice at the end of the session to calm 6 minutes x          Manual Therapy Time/  Technique  Notes/Comments   Gentle vibration to relax upper trap   Patient reports pain in this location, difficult time relaxing in the sling.   STM to distal bicep, belly of bicep, proximal forearm   To decrease tightness and associated pain   Sitting scapular mobs, STM to upper trap and medial scap border  x                Exercises   Sets/  Sec Reps  Notes/Comments   Scapular exercises  Retraction  Bwd shoulder rolls     x  x    A/AROM   Elbow flexion/extension  Supination/pronation  Fist pumps             Table slides to tolerance   x    Supine PROM per protocol   x Flexion, ER, IR to belly, cross body    pulleys    Greatly increased tolerance for pulleys   Supine AAROM bench  held   x           Activities Time    Notes/Comments   Review protocol      Sling adjustment to increase ability to relax in sling              Specific Interventions Next Treatment: Per [facility] protocol for RCR, DOS 1/23/25.    6 to 12 weeks:Continue above exercises Initiate PROM for internal rotation, cross body abduction and extension, gradually working toward pure abduction AAROM AROM at 8 weeks for small tears if correct scapulothoracic rhythm Graded II and III glenohumeral/scapulthoracic joint mobilization Rotator cuff isometrics (submaximal) Initiate light strengthening for internal rotation, external rotation and extension when full AROM is achieved Modalities PRN Pool therapy PRN    Activity/Treatment Tolerance:  [x]  Patient tolerated

## 2025-03-14 ENCOUNTER — HOSPITAL ENCOUNTER (OUTPATIENT)
Dept: OCCUPATIONAL THERAPY | Age: 60
Setting detail: THERAPIES SERIES
End: 2025-03-14
Payer: COMMERCIAL

## 2025-03-17 ENCOUNTER — HOSPITAL ENCOUNTER (OUTPATIENT)
Dept: OCCUPATIONAL THERAPY | Age: 60
Setting detail: THERAPIES SERIES
Discharge: HOME | End: 2025-03-17
Payer: COMMERCIAL

## 2025-03-17 PROCEDURE — 97110 THERAPEUTIC EXERCISES: CPT

## 2025-03-17 NOTE — PROGRESS NOTES
[facility]  OCCUPATIONAL THERAPY  [] EVALUATION  [x] DAILY NOTE (LAND) [] DAILY NOTE (AQUATIC ) [] PROGRESS NOTE [] DISCHARGE NOTE    [] [facility] [place]   [x] [place] [facility]    [] [facility]   [] [facility]    Date: 3/17/2025  Patient Name:  Johanny Uriarte  : 1965  MRN: [id number]  CSN: [id number]    Referring Practitioner Kev Sunshine MD [id number]      Diagnosis  Diagnoses       M75.121 (ICD-10-CM) - Complete rotator cuff tear or rupture of right shoulder, not specified as traumatic           Treatment Diagnosis M25.511  Right Shoulder Pain  M25.611 Stiffness of Right Shoulder   Date of Evaluation 25   Additional Pertinent History Johanny Uriarte has a past medical history of Anxiety, Depression, Hyperlipidemia, Hypertension, Left-sided Bell's palsy, Smoker, and Wears dentures.  she has a past surgical history that includes  section; Hysterectomy (); Endoscopy, colon, diagnostic; Knee arthroscopy (Bilateral); Dental surgery; egd colonoscopy (Left, 10/21/2019); Ovary removal (Bilateral, ); and [facility] STEREO BREAST BX W LOC DEVICE 1ST LESION LEFT (Left, 2014).     Allergies Allergies   Allergen Reactions    Sulfa Antibiotics Rash    Demerol      BP decrease, dizziness    Wellbutrin [Bupropion] Other (See Comments)     Panic Attacks    Amoxicillin-Pot Clavulanate Rash      Medications   Current Outpatient Medications:     Ubrogepant (UBRELVY) 50 MG TABS, Take 50 mg by mouth as needed (headache), Disp: 8 tablet, Rfl: 3    Ubrogepant (UBRELVY) 50 MG TABS, Take 50 mg by mouth daily as needed (FOR HEADACHES), Disp: 4 tablet, Rfl: 0    losartan (COZAAR) 50 MG tablet, Take 1 tablet by mouth daily, Disp: , Rfl:     phentermine 37.5 MG capsule, Take 1 capsule by mouth every morning., Disp: , Rfl:     Cholecalciferol (VITAMIN D) 25 MCG TABS, Take 1 tablet by mouth daily, Disp: 60 tablet, Rfl: 0    escitalopram

## 2025-03-20 ENCOUNTER — HOSPITAL ENCOUNTER (OUTPATIENT)
Dept: OCCUPATIONAL THERAPY | Age: 60
Setting detail: THERAPIES SERIES
End: 2025-03-20
Payer: COMMERCIAL

## 2025-03-24 ENCOUNTER — HOSPITAL ENCOUNTER (OUTPATIENT)
Dept: OCCUPATIONAL THERAPY | Age: 60
Setting detail: THERAPIES SERIES
Discharge: HOME | End: 2025-03-24
Payer: COMMERCIAL

## 2025-03-24 ENCOUNTER — APPOINTMENT (OUTPATIENT)
Dept: OCCUPATIONAL THERAPY | Age: 60
End: 2025-03-24
Payer: COMMERCIAL

## 2025-03-24 PROCEDURE — 97110 THERAPEUTIC EXERCISES: CPT

## 2025-03-24 NOTE — PROGRESS NOTES
[facility]  OCCUPATIONAL THERAPY  [] EVALUATION  [] DAILY NOTE (LAND) [] DAILY NOTE (AQUATIC ) [x] PROGRESS NOTE [] DISCHARGE NOTE    [] [facility] [place]   [x] [place] [facility]    [] [facility]   [] [facility]    Date: 3/24/2025  Patient Name:  Johanny Uriarte  : 1965  MRN: [id number]  CSN: [id number]    Referring Practitioner Kev Sunshine MD [id number]      Diagnosis  Diagnoses       M75.121 (ICD-10-CM) - Complete rotator cuff tear or rupture of right shoulder, not specified as traumatic           Treatment Diagnosis M25.511  Right Shoulder Pain  M25.611 Stiffness of Right Shoulder   Date of Evaluation 25   Additional Pertinent History Johanny Uriarte has a past medical history of Anxiety, Depression, Hyperlipidemia, Hypertension, Left-sided Bell's palsy, Smoker, and Wears dentures.  she has a past surgical history that includes  section; Hysterectomy (); Endoscopy, colon, diagnostic; Knee arthroscopy (Bilateral); Dental surgery; egd colonoscopy (Left, 10/21/2019); Ovary removal (Bilateral, ); and [facility] STEREO BREAST BX W LOC DEVICE 1ST LESION LEFT (Left, 2014).     Allergies Allergies   Allergen Reactions    Sulfa Antibiotics Rash    Demerol      BP decrease, dizziness    Wellbutrin [Bupropion] Other (See Comments)     Panic Attacks    Amoxicillin-Pot Clavulanate Rash      Medications   Current Outpatient Medications:     Ubrogepant (UBRELVY) 50 MG TABS, Take 50 mg by mouth as needed (headache), Disp: 8 tablet, Rfl: 3    Ubrogepant (UBRELVY) 50 MG TABS, Take 50 mg by mouth daily as needed (FOR HEADACHES), Disp: 4 tablet, Rfl: 0    losartan (COZAAR) 50 MG tablet, Take 1 tablet by mouth daily, Disp: , Rfl:     phentermine 37.5 MG capsule, Take 1 capsule by mouth every morning., Disp: , Rfl:     Cholecalciferol (VITAMIN D) 25 MCG TABS, Take 1 tablet by mouth daily, Disp: 60 tablet, Rfl: 0    escitalopram

## 2025-03-27 ENCOUNTER — APPOINTMENT (OUTPATIENT)
Dept: OCCUPATIONAL THERAPY | Age: 60
End: 2025-03-27
Payer: COMMERCIAL

## 2025-03-31 ENCOUNTER — APPOINTMENT (OUTPATIENT)
Dept: OCCUPATIONAL THERAPY | Age: 60
End: 2025-03-31
Payer: COMMERCIAL

## 2025-04-01 ENCOUNTER — HOSPITAL ENCOUNTER (OUTPATIENT)
Dept: OCCUPATIONAL THERAPY | Age: 60
Setting detail: THERAPIES SERIES
Discharge: HOME | End: 2025-04-01
Payer: COMMERCIAL

## 2025-04-01 PROCEDURE — 97110 THERAPEUTIC EXERCISES: CPT

## 2025-04-01 NOTE — PROGRESS NOTES
[facility]  OCCUPATIONAL THERAPY  [] EVALUATION  [x] DAILY NOTE (LAND) [] DAILY NOTE (AQUATIC ) [] PROGRESS NOTE [] DISCHARGE NOTE    [] [facility] [place]   [x] [place] [facility]    [] [facility]   [] [facility]    Date: 2025  Patient Name:  Johanny Uriarte  : 1965  MRN: [id number]  CSN: [id number]    Referring Practitioner Kev Sunshine MD [id number]      Diagnosis  Diagnoses       M75.121 (ICD-10-CM) - Complete rotator cuff tear or rupture of right shoulder, not specified as traumatic           Treatment Diagnosis M25.511  Right Shoulder Pain  M25.611 Stiffness of Right Shoulder   Date of Evaluation 25   Additional Pertinent History Johanny Uriarte has a past medical history of Anxiety, Depression, Hyperlipidemia, Hypertension, Left-sided Bell's palsy, Smoker, and Wears dentures.  she has a past surgical history that includes  section; Hysterectomy (); Endoscopy, colon, diagnostic; Knee arthroscopy (Bilateral); Dental surgery; egd colonoscopy (Left, 10/21/2019); Ovary removal (Bilateral, ); and [facility] STEREO BREAST BX W LOC DEVICE 1ST LESION LEFT (Left, 2014).     Allergies Allergies   Allergen Reactions    Sulfa Antibiotics Rash    Demerol      BP decrease, dizziness    Wellbutrin [Bupropion] Other (See Comments)     Panic Attacks    Amoxicillin-Pot Clavulanate Rash      Medications   Current Outpatient Medications:     Ubrogepant (UBRELVY) 50 MG TABS, Take 50 mg by mouth as needed (headache), Disp: 8 tablet, Rfl: 3    Ubrogepant (UBRELVY) 50 MG TABS, Take 50 mg by mouth daily as needed (FOR HEADACHES), Disp: 4 tablet, Rfl: 0    losartan (COZAAR) 50 MG tablet, Take 1 tablet by mouth daily, Disp: , Rfl:     phentermine 37.5 MG capsule, Take 1 capsule by mouth every morning., Disp: , Rfl:     Cholecalciferol (VITAMIN D) 25 MCG TABS, Take 1 tablet by mouth daily, Disp: 60 tablet, Rfl: 0    escitalopram

## 2025-04-03 ENCOUNTER — HOSPITAL ENCOUNTER (OUTPATIENT)
Dept: OCCUPATIONAL THERAPY | Age: 60
Setting detail: THERAPIES SERIES
End: 2025-04-03
Payer: COMMERCIAL

## 2025-04-08 ENCOUNTER — HOSPITAL ENCOUNTER (OUTPATIENT)
Dept: OCCUPATIONAL THERAPY | Age: 60
Setting detail: THERAPIES SERIES
Discharge: HOME | End: 2025-04-08
Payer: COMMERCIAL

## 2025-04-08 PROCEDURE — 97110 THERAPEUTIC EXERCISES: CPT

## 2025-04-08 NOTE — PROGRESS NOTES
[facility]  OCCUPATIONAL THERAPY  [] EVALUATION  [x] DAILY NOTE (LAND) [] DAILY NOTE (AQUATIC ) [] PROGRESS NOTE [] DISCHARGE NOTE    [] [facility] [place]   [x] [place] [facility]    [] [facility]   [] [facility]    Date: 2025  Patient Name:  Johanny Uriarte  : 1965  MRN: [id number]  CSN: [id number]    Referring Practitioner Kev Sunshine MD [id number]      Diagnosis  Diagnoses       M75.121 (ICD-10-CM) - Complete rotator cuff tear or rupture of right shoulder, not specified as traumatic           Treatment Diagnosis M25.511  Right Shoulder Pain  M25.611 Stiffness of Right Shoulder   Date of Evaluation 25   Additional Pertinent History Johanny Uriarte has a past medical history of Anxiety, Depression, Hyperlipidemia, Hypertension, Left-sided Bell's palsy, Smoker, and Wears dentures.  she has a past surgical history that includes  section; Hysterectomy (); Endoscopy, colon, diagnostic; Knee arthroscopy (Bilateral); Dental surgery; egd colonoscopy (Left, 10/21/2019); Ovary removal (Bilateral, ); and [facility] STEREO BREAST BX W LOC DEVICE 1ST LESION LEFT (Left, 2014).     Allergies Allergies   Allergen Reactions    Sulfa Antibiotics Rash    Demerol      BP decrease, dizziness    Wellbutrin [Bupropion] Other (See Comments)     Panic Attacks    Amoxicillin-Pot Clavulanate Rash      Medications   Current Outpatient Medications:     Ubrogepant (UBRELVY) 50 MG TABS, Take 50 mg by mouth as needed (headache), Disp: 8 tablet, Rfl: 3    Ubrogepant (UBRELVY) 50 MG TABS, Take 50 mg by mouth daily as needed (FOR HEADACHES), Disp: 4 tablet, Rfl: 0    losartan (COZAAR) 50 MG tablet, Take 1 tablet by mouth daily, Disp: , Rfl:     phentermine 37.5 MG capsule, Take 1 capsule by mouth every morning., Disp: , Rfl:     Cholecalciferol (VITAMIN D) 25 MCG TABS, Take 1 tablet by mouth daily, Disp: 60 tablet, Rfl: 0    escitalopram

## 2025-04-10 ENCOUNTER — APPOINTMENT (OUTPATIENT)
Dept: OCCUPATIONAL THERAPY | Age: 60
End: 2025-04-10
Payer: COMMERCIAL

## 2025-04-15 ENCOUNTER — HOSPITAL ENCOUNTER (OUTPATIENT)
Dept: OCCUPATIONAL THERAPY | Age: 60
Setting detail: THERAPIES SERIES
Discharge: HOME | End: 2025-04-15
Payer: COMMERCIAL

## 2025-04-15 PROCEDURE — 97110 THERAPEUTIC EXERCISES: CPT

## 2025-04-15 NOTE — PROGRESS NOTES
weekly - 1 sets - 10 reps  - Seated Elbow Flexion AAROM  - 3 x daily - 7 x weekly - 1 sets - 10 reps  - Standing Forearm Pronation and Supination AROM  - 3 x daily - 7 x weekly - 1 sets - 10 reps  - Seated Gripping Towel  - 3 x daily - 7 x weekly - 1 sets - 10 reps  AAROM  Cross body and submaximal isometrics  []  No new Education completed  []  Reviewed Prior HEP      [x]  Patient verbalized and/or demonstrated understanding of education provided.  []  Patient unable to verbalize and/or demonstrate understanding of education provided.  Will continue education.  [x]  Barriers to learning: none    PLAN:  Treatment Recommendations: Range of Motion, Neuromuscular Re-education, Manual Therapy - Soft Tissue Mobilization, Manual Therapy - Joint Manipulation, Pain Management, Home Exercise Program, Patient Education, Safety Education and Training, and Modalities    []  Plan of care initiated.  Plan to see patient 1 times per week for 6 weeks, then 2x/wk x 6 weeks to address the treatment planned outlined above.  [x]  Continue with current plan of care  []  Modify plan of care as follows:    []  Hold pending physician visit  []  Discharge    Time In 1215   Time Out 1250   Timed Code Minutes: 35 min   Total Treatment Time: 35 min       Electronically Signed by:  STEVEN Rucker, OTR/L [id number]

## 2025-04-17 ENCOUNTER — APPOINTMENT (OUTPATIENT)
Dept: OCCUPATIONAL THERAPY | Age: 60
End: 2025-04-17
Payer: COMMERCIAL

## 2025-04-22 ENCOUNTER — HOSPITAL ENCOUNTER (OUTPATIENT)
Dept: OCCUPATIONAL THERAPY | Age: 60
Setting detail: THERAPIES SERIES
End: 2025-04-22
Payer: COMMERCIAL

## 2025-04-25 ENCOUNTER — APPOINTMENT (OUTPATIENT)
Dept: OCCUPATIONAL THERAPY | Age: 60
End: 2025-04-25
Payer: COMMERCIAL

## 2025-04-29 ENCOUNTER — HOSPITAL ENCOUNTER (OUTPATIENT)
Dept: OCCUPATIONAL THERAPY | Age: 60
Setting detail: THERAPIES SERIES
End: 2025-04-29
Payer: COMMERCIAL

## 2025-08-06 ENCOUNTER — HOSPITAL ENCOUNTER (OUTPATIENT)
Dept: MAMMOGRAPHY | Age: 60
Discharge: HOME | End: 2025-08-06
Payer: COMMERCIAL

## 2025-08-06 VITALS — WEIGHT: 225 LBS | HEIGHT: 67 IN | BODY MASS INDEX: 35.31 KG/M2

## 2025-08-06 DIAGNOSIS — Z12.31 VISIT FOR SCREENING MAMMOGRAM: ICD-10-CM

## 2025-08-06 PROCEDURE — 77063 BREAST TOMOSYNTHESIS BI: CPT

## 2025-08-21 ENCOUNTER — TRANSCRIBE ORDERS (OUTPATIENT)
Dept: ADMINISTRATIVE | Age: 60
End: 2025-08-21

## 2025-08-21 DIAGNOSIS — E78.2 MIXED HYPERLIPIDEMIA: Primary | ICD-10-CM

## 2025-09-03 ENCOUNTER — HOSPITAL ENCOUNTER (OUTPATIENT)
Dept: CT IMAGING | Age: 60
Discharge: HOME | End: 2025-09-03
Attending: FAMILY MEDICINE
Payer: COMMERCIAL

## 2025-09-03 DIAGNOSIS — E78.2 MIXED HYPERLIPIDEMIA: ICD-10-CM

## 2025-09-03 PROCEDURE — 75571 CT HRT W/O DYE W/CA TEST: CPT

## (undated) DEVICE — CATHETER ETER IV 22GA L1IN POLYUR STR RADPQ INTROCAN SFTY

## (undated) DEVICE — SOLUTION IV 1000ML 0.45% SOD CHL PH 5 INJ USP VIAFLX PLAS

## (undated) DEVICE — SET LNR RED GRN W/ BASE CLEANASCOPE

## (undated) DEVICE — KIT INF CTRL 2OZ LUB TBNG L12FT DBL END BRSH SYR OP4

## (undated) DEVICE — TUBING IV STOPCOCK 48 CM 3 W

## (undated) DEVICE — IV START KIT: Brand: MEDLINE INDUSTRIES, INC.

## (undated) DEVICE — CONMED SCOPE SAVER BITE BLOCK, 20X27 MM: Brand: SCOPE SAVER

## (undated) DEVICE — SET ADMIN 25ML L117IN PMP MOD CK VLV RLER CLMP 2 SMRTSITE